# Patient Record
Sex: FEMALE | Race: WHITE | NOT HISPANIC OR LATINO | Employment: UNEMPLOYED | URBAN - METROPOLITAN AREA
[De-identification: names, ages, dates, MRNs, and addresses within clinical notes are randomized per-mention and may not be internally consistent; named-entity substitution may affect disease eponyms.]

---

## 2024-05-11 ENCOUNTER — APPOINTMENT (OUTPATIENT)
Dept: GENERAL RADIOLOGY | Facility: HOSPITAL | Age: 54
End: 2024-05-11
Payer: MEDICAID

## 2024-05-11 ENCOUNTER — APPOINTMENT (OUTPATIENT)
Dept: CT IMAGING | Facility: HOSPITAL | Age: 54
End: 2024-05-11
Payer: MEDICAID

## 2024-05-11 ENCOUNTER — HOSPITAL ENCOUNTER (INPATIENT)
Facility: HOSPITAL | Age: 54
LOS: 7 days | Discharge: HOME | End: 2024-05-20
Attending: STUDENT IN AN ORGANIZED HEALTH CARE EDUCATION/TRAINING PROGRAM | Admitting: HOSPITALIST
Payer: MEDICAID

## 2024-05-11 DIAGNOSIS — B34.8 RHINOVIRUS: ICD-10-CM

## 2024-05-11 DIAGNOSIS — A41.9 SEPSIS WITHOUT ACUTE ORGAN DYSFUNCTION, DUE TO UNSPECIFIED ORGANISM: ICD-10-CM

## 2024-05-11 DIAGNOSIS — I50.33 ACUTE ON CHRONIC DIASTOLIC CONGESTIVE HEART FAILURE: ICD-10-CM

## 2024-05-11 DIAGNOSIS — R06.02 SHORTNESS OF BREATH: ICD-10-CM

## 2024-05-11 DIAGNOSIS — J18.9 PNEUMONIA OF BOTH LUNGS DUE TO INFECTIOUS ORGANISM, UNSPECIFIED PART OF LUNG: ICD-10-CM

## 2024-05-11 DIAGNOSIS — R07.9 CHEST PAIN, UNSPECIFIED TYPE: ICD-10-CM

## 2024-05-11 DIAGNOSIS — I50.23 ACUTE ON CHRONIC HFREF (HEART FAILURE WITH REDUCED EJECTION FRACTION): ICD-10-CM

## 2024-05-11 DIAGNOSIS — I97.630 POSTOPERATIVE HEMATOMA INVOLVING CIRCULATORY SYSTEM FOLLOWING CARDIAC CATHETERIZATION: ICD-10-CM

## 2024-05-11 DIAGNOSIS — I50.9 ACUTE ON CHRONIC CONGESTIVE HEART FAILURE, UNSPECIFIED HEART FAILURE TYPE: Primary | ICD-10-CM

## 2024-05-11 LAB
ALBUMIN SERPL-MCNC: 3.7 G/DL (ref 3.5–5.2)
ALBUMIN/GLOB SERPL: 0.9 G/DL
ALP SERPL-CCNC: 103 U/L (ref 39–117)
ALT SERPL W P-5'-P-CCNC: 17 U/L (ref 1–33)
ANION GAP SERPL CALCULATED.3IONS-SCNC: 14 MMOL/L (ref 5–15)
APTT PPP: 26.6 SECONDS (ref 61–76.5)
AST SERPL-CCNC: 16 U/L (ref 1–32)
B PARAPERT DNA SPEC QL NAA+PROBE: NOT DETECTED
B PERT DNA SPEC QL NAA+PROBE: NOT DETECTED
BASOPHILS # BLD AUTO: 0.04 10*3/MM3 (ref 0–0.2)
BASOPHILS NFR BLD AUTO: 0.4 % (ref 0–1.5)
BILIRUB SERPL-MCNC: 0.8 MG/DL (ref 0–1.2)
BUN SERPL-MCNC: 11 MG/DL (ref 6–20)
BUN/CREAT SERPL: 12.4 (ref 7–25)
C PNEUM DNA NPH QL NAA+NON-PROBE: NOT DETECTED
CALCIUM SPEC-SCNC: 9.8 MG/DL (ref 8.6–10.5)
CHLORIDE SERPL-SCNC: 97 MMOL/L (ref 98–107)
CO2 SERPL-SCNC: 25 MMOL/L (ref 22–29)
CREAT SERPL-MCNC: 0.89 MG/DL (ref 0.57–1)
D-LACTATE SERPL-SCNC: 2.1 MMOL/L (ref 0.3–2)
DEPRECATED RDW RBC AUTO: 48.8 FL (ref 37–54)
EGFRCR SERPLBLD CKD-EPI 2021: 78.1 ML/MIN/1.73
EOSINOPHIL # BLD AUTO: 0.05 10*3/MM3 (ref 0–0.4)
EOSINOPHIL NFR BLD AUTO: 0.4 % (ref 0.3–6.2)
ERYTHROCYTE [DISTWIDTH] IN BLOOD BY AUTOMATED COUNT: 17 % (ref 12.3–15.4)
FLUAV SUBTYP SPEC NAA+PROBE: NOT DETECTED
FLUBV RNA ISLT QL NAA+PROBE: NOT DETECTED
GLOBULIN UR ELPH-MCNC: 4.3 GM/DL
GLUCOSE SERPL-MCNC: 155 MG/DL (ref 65–99)
HADV DNA SPEC NAA+PROBE: NOT DETECTED
HCOV 229E RNA SPEC QL NAA+PROBE: NOT DETECTED
HCOV HKU1 RNA SPEC QL NAA+PROBE: NOT DETECTED
HCOV NL63 RNA SPEC QL NAA+PROBE: NOT DETECTED
HCOV OC43 RNA SPEC QL NAA+PROBE: NOT DETECTED
HCT VFR BLD AUTO: 39.8 % (ref 34–46.6)
HGB BLD-MCNC: 11.4 G/DL (ref 12–15.9)
HMPV RNA NPH QL NAA+NON-PROBE: NOT DETECTED
HOLD SPECIMEN: NORMAL
HPIV1 RNA ISLT QL NAA+PROBE: NOT DETECTED
HPIV2 RNA SPEC QL NAA+PROBE: NOT DETECTED
HPIV3 RNA NPH QL NAA+PROBE: NOT DETECTED
HPIV4 P GENE NPH QL NAA+PROBE: NOT DETECTED
IMM GRANULOCYTES # BLD AUTO: 0.15 10*3/MM3 (ref 0–0.05)
IMM GRANULOCYTES NFR BLD AUTO: 1.3 % (ref 0–0.5)
INR PPP: 1.26 (ref 0.93–1.1)
LIPASE SERPL-CCNC: 27 U/L (ref 13–60)
LYMPHOCYTES # BLD AUTO: 2.11 10*3/MM3 (ref 0.7–3.1)
LYMPHOCYTES NFR BLD AUTO: 18.7 % (ref 19.6–45.3)
M PNEUMO IGG SER IA-ACNC: NOT DETECTED
MCH RBC QN AUTO: 22.8 PG (ref 26.6–33)
MCHC RBC AUTO-ENTMCNC: 28.6 G/DL (ref 31.5–35.7)
MCV RBC AUTO: 79.8 FL (ref 79–97)
MONOCYTES # BLD AUTO: 0.94 10*3/MM3 (ref 0.1–0.9)
MONOCYTES NFR BLD AUTO: 8.3 % (ref 5–12)
NEUTROPHILS NFR BLD AUTO: 70.9 % (ref 42.7–76)
NEUTROPHILS NFR BLD AUTO: 8.02 10*3/MM3 (ref 1.7–7)
NRBC BLD AUTO-RTO: 0 /100 WBC (ref 0–0.2)
NT-PROBNP SERPL-MCNC: ABNORMAL PG/ML (ref 0–900)
PLATELET # BLD AUTO: 350 10*3/MM3 (ref 140–450)
PMV BLD AUTO: 9.9 FL (ref 6–12)
POTASSIUM SERPL-SCNC: 4.2 MMOL/L (ref 3.5–5.2)
PROT SERPL-MCNC: 8 G/DL (ref 6–8.5)
PROTHROMBIN TIME: 13.5 SECONDS (ref 9.6–11.7)
RBC # BLD AUTO: 4.99 10*6/MM3 (ref 3.77–5.28)
RHINOVIRUS RNA SPEC NAA+PROBE: DETECTED
RSV RNA NPH QL NAA+NON-PROBE: NOT DETECTED
SARS-COV-2 RNA NPH QL NAA+NON-PROBE: NOT DETECTED
SODIUM SERPL-SCNC: 136 MMOL/L (ref 136–145)
TROPONIN T SERPL HS-MCNC: 42 NG/L
WBC NRBC COR # BLD AUTO: 11.31 10*3/MM3 (ref 3.4–10.8)

## 2024-05-11 PROCEDURE — 93005 ELECTROCARDIOGRAM TRACING: CPT

## 2024-05-11 PROCEDURE — 71045 X-RAY EXAM CHEST 1 VIEW: CPT

## 2024-05-11 PROCEDURE — 25510000001 IOPAMIDOL PER 1 ML: Performed by: PHYSICIAN ASSISTANT

## 2024-05-11 PROCEDURE — 80053 COMPREHEN METABOLIC PANEL: CPT | Performed by: PHYSICIAN ASSISTANT

## 2024-05-11 PROCEDURE — 85730 THROMBOPLASTIN TIME PARTIAL: CPT | Performed by: PHYSICIAN ASSISTANT

## 2024-05-11 PROCEDURE — 25010000002 CEFTRIAXONE PER 250 MG: Performed by: PHYSICIAN ASSISTANT

## 2024-05-11 PROCEDURE — 71275 CT ANGIOGRAPHY CHEST: CPT

## 2024-05-11 PROCEDURE — 84484 ASSAY OF TROPONIN QUANT: CPT | Performed by: PHYSICIAN ASSISTANT

## 2024-05-11 PROCEDURE — 25010000002 HYDROMORPHONE 1 MG/ML SOLUTION: Performed by: PHYSICIAN ASSISTANT

## 2024-05-11 PROCEDURE — 25010000002 LABETALOL 5 MG/ML SOLUTION: Performed by: PHYSICIAN ASSISTANT

## 2024-05-11 PROCEDURE — G0378 HOSPITAL OBSERVATION PER HR: HCPCS

## 2024-05-11 PROCEDURE — 85025 COMPLETE CBC W/AUTO DIFF WBC: CPT | Performed by: PHYSICIAN ASSISTANT

## 2024-05-11 PROCEDURE — 25010000002 ONDANSETRON PER 1 MG: Performed by: PHYSICIAN ASSISTANT

## 2024-05-11 PROCEDURE — 87040 BLOOD CULTURE FOR BACTERIA: CPT | Performed by: PHYSICIAN ASSISTANT

## 2024-05-11 PROCEDURE — 83880 ASSAY OF NATRIURETIC PEPTIDE: CPT | Performed by: PHYSICIAN ASSISTANT

## 2024-05-11 PROCEDURE — 83690 ASSAY OF LIPASE: CPT | Performed by: PHYSICIAN ASSISTANT

## 2024-05-11 PROCEDURE — 85610 PROTHROMBIN TIME: CPT | Performed by: PHYSICIAN ASSISTANT

## 2024-05-11 PROCEDURE — 83605 ASSAY OF LACTIC ACID: CPT | Performed by: PHYSICIAN ASSISTANT

## 2024-05-11 PROCEDURE — 0202U NFCT DS 22 TRGT SARS-COV-2: CPT | Performed by: PHYSICIAN ASSISTANT

## 2024-05-11 PROCEDURE — 93005 ELECTROCARDIOGRAM TRACING: CPT | Performed by: STUDENT IN AN ORGANIZED HEALTH CARE EDUCATION/TRAINING PROGRAM

## 2024-05-11 PROCEDURE — 25010000002 FUROSEMIDE PER 20 MG: Performed by: PHYSICIAN ASSISTANT

## 2024-05-11 PROCEDURE — 99285 EMERGENCY DEPT VISIT HI MDM: CPT

## 2024-05-11 RX ORDER — METOPROLOL SUCCINATE 25 MG/1
25 TABLET, EXTENDED RELEASE ORAL DAILY
COMMUNITY

## 2024-05-11 RX ORDER — LABETALOL HYDROCHLORIDE 5 MG/ML
20 INJECTION, SOLUTION INTRAVENOUS ONCE
Status: COMPLETED | OUTPATIENT
Start: 2024-05-11 | End: 2024-05-11

## 2024-05-11 RX ORDER — SPIRONOLACTONE 25 MG/1
25 TABLET ORAL DAILY
COMMUNITY
End: 2024-05-20 | Stop reason: HOSPADM

## 2024-05-11 RX ORDER — ONDANSETRON 2 MG/ML
4 INJECTION INTRAMUSCULAR; INTRAVENOUS ONCE
Status: COMPLETED | OUTPATIENT
Start: 2024-05-11 | End: 2024-05-11

## 2024-05-11 RX ORDER — LOSARTAN POTASSIUM 25 MG/1
25 TABLET ORAL DAILY
COMMUNITY
End: 2024-05-20 | Stop reason: HOSPADM

## 2024-05-11 RX ORDER — FUROSEMIDE 10 MG/ML
80 INJECTION INTRAMUSCULAR; INTRAVENOUS ONCE
Status: COMPLETED | OUTPATIENT
Start: 2024-05-11 | End: 2024-05-11

## 2024-05-11 RX ORDER — FUROSEMIDE 40 MG/1
40 TABLET ORAL DAILY
COMMUNITY

## 2024-05-11 RX ORDER — SODIUM CHLORIDE 0.9 % (FLUSH) 0.9 %
10 SYRINGE (ML) INJECTION AS NEEDED
Status: DISCONTINUED | OUTPATIENT
Start: 2024-05-11 | End: 2024-05-20 | Stop reason: HOSPADM

## 2024-05-11 RX ADMIN — Medication 20 MG: at 21:41

## 2024-05-11 RX ADMIN — ONDANSETRON 4 MG: 2 INJECTION INTRAMUSCULAR; INTRAVENOUS at 22:05

## 2024-05-11 RX ADMIN — HYDROMORPHONE HYDROCHLORIDE 0.5 MG: 1 INJECTION, SOLUTION INTRAMUSCULAR; INTRAVENOUS; SUBCUTANEOUS at 22:05

## 2024-05-11 RX ADMIN — FUROSEMIDE 80 MG: 10 INJECTION, SOLUTION INTRAMUSCULAR; INTRAVENOUS at 22:05

## 2024-05-11 RX ADMIN — IOPAMIDOL 100 ML: 755 INJECTION, SOLUTION INTRAVENOUS at 22:02

## 2024-05-11 RX ADMIN — CEFTRIAXONE 2000 MG: 2 INJECTION, POWDER, FOR SOLUTION INTRAMUSCULAR; INTRAVENOUS at 22:10

## 2024-05-11 RX ADMIN — DOXYCYCLINE 100 MG: 100 INJECTION, POWDER, LYOPHILIZED, FOR SOLUTION INTRAVENOUS at 22:10

## 2024-05-11 NOTE — Clinical Note
Level of Care: Telemetry [5]   Diagnosis: CHF exacerbation [575819]   Admitting Physician: SOPHIA HERNANDEZ [[id number]]   Attending Physician: SOPHIA HERNANDEZ [[id number]]

## 2024-05-11 NOTE — Clinical Note
A 6 fr sheath was successfully inserted into the right femoral artery. Sheath insertion not delayed.

## 2024-05-12 ENCOUNTER — APPOINTMENT (OUTPATIENT)
Dept: CARDIOLOGY | Facility: HOSPITAL | Age: 54
End: 2024-05-12
Payer: MEDICAID

## 2024-05-12 LAB
ARTERIAL PATENCY WRIST A: POSITIVE
ATMOSPHERIC PRESS: ABNORMAL MM[HG]
BASE EXCESS BLDA CALC-SCNC: 3 MMOL/L (ref 0–3)
BDY SITE: ABNORMAL
BH CV ECHO LEFT VENTRICLE GLOBAL LONGITUDINAL STRAIN: -6.4 %
BH CV ECHO MEAS - AO MAX PG: 8.2 MMHG
BH CV ECHO MEAS - AO MEAN PG: 4 MMHG
BH CV ECHO MEAS - AO V2 MAX: 143 CM/SEC
BH CV ECHO MEAS - AO V2 VTI: 18.9 CM
BH CV ECHO MEAS - AVA(I,D): 1.51 CM2
BH CV ECHO MEAS - EDV(CUBED): 91.1 ML
BH CV ECHO MEAS - EDV(MOD-SP4): 113 ML
BH CV ECHO MEAS - EF(MOD-BP): 21.5 %
BH CV ECHO MEAS - EF(MOD-SP4): 21.5 %
BH CV ECHO MEAS - EF_3D-VOL: 18 %
BH CV ECHO MEAS - ESV(CUBED): 74.1 ML
BH CV ECHO MEAS - ESV(MOD-SP4): 88.7 ML
BH CV ECHO MEAS - FS: 6.7 %
BH CV ECHO MEAS - IVS/LVPW: 1.11 CM
BH CV ECHO MEAS - IVSD: 1 CM
BH CV ECHO MEAS - LA DIMENSION: 5 CM
BH CV ECHO MEAS - LAT PEAK E' VEL: 9.7 CM/SEC
BH CV ECHO MEAS - LV DIASTOLIC VOL/BSA (35-75): 56 CM2
BH CV ECHO MEAS - LV MASS(C)D: 142.9 GRAMS
BH CV ECHO MEAS - LV MAX PG: 1.83 MMHG
BH CV ECHO MEAS - LV MEAN PG: 1 MMHG
BH CV ECHO MEAS - LV SYSTOLIC VOL/BSA (12-30): 43.9 CM2
BH CV ECHO MEAS - LV V1 MAX: 67.6 CM/SEC
BH CV ECHO MEAS - LV V1 VTI: 9.1 CM
BH CV ECHO MEAS - LVIDD: 4.5 CM
BH CV ECHO MEAS - LVIDS: 4.2 CM
BH CV ECHO MEAS - LVOT AREA: 3.1 CM2
BH CV ECHO MEAS - LVOT DIAM: 2 CM
BH CV ECHO MEAS - LVPWD: 0.9 CM
BH CV ECHO MEAS - MED PEAK E' VEL: 5.8 CM/SEC
BH CV ECHO MEAS - MR MAX PG: 79.2 MMHG
BH CV ECHO MEAS - MR MAX VEL: 445 CM/SEC
BH CV ECHO MEAS - MR MEAN PG: 56 MMHG
BH CV ECHO MEAS - MR MEAN VEL: 364 CM/SEC
BH CV ECHO MEAS - MR VTI: 112 CM
BH CV ECHO MEAS - MV A MAX VEL: 82.3 CM/SEC
BH CV ECHO MEAS - MV DEC SLOPE: 982 CM/SEC2
BH CV ECHO MEAS - MV DEC TIME: 0.11 SEC
BH CV ECHO MEAS - MV E MAX VEL: 107 CM/SEC
BH CV ECHO MEAS - MV E/A: 1.3
BH CV ECHO MEAS - MV MAX PG: 5.5 MMHG
BH CV ECHO MEAS - MV MEAN PG: 3 MMHG
BH CV ECHO MEAS - MV P1/2T: 35.8 MSEC
BH CV ECHO MEAS - MV V2 VTI: 20.9 CM
BH CV ECHO MEAS - MVA(P1/2T): 6.1 CM2
BH CV ECHO MEAS - MVA(VTI): 1.36 CM2
BH CV ECHO MEAS - PA V2 MAX: 80.8 CM/SEC
BH CV ECHO MEAS - RAP SYSTOLE: 15 MMHG
BH CV ECHO MEAS - RV MAX PG: 0.54 MMHG
BH CV ECHO MEAS - RV V1 MAX: 36.7 CM/SEC
BH CV ECHO MEAS - RV V1 VTI: 5.5 CM
BH CV ECHO MEAS - RVSP: 50 MMHG
BH CV ECHO MEAS - SV(LVOT): 28.5 ML
BH CV ECHO MEAS - SV(MOD-SP4): 24.3 ML
BH CV ECHO MEAS - SVI(LVOT): 14.1 ML/M2
BH CV ECHO MEAS - SVI(MOD-SP4): 12 ML/M2
BH CV ECHO MEAS - TAPSE (>1.6): 0.96 CM
BH CV ECHO MEAS - TR MAX PG: 35 MMHG
BH CV ECHO MEAS - TR MAX VEL: 296 CM/SEC
BH CV ECHO MEASUREMENTS AVERAGE E/E' RATIO: 13.81
BH CV LOWER ARTERIAL LEFT ABI RATIO: 0.69
BH CV LOWER ARTERIAL LEFT GREAT TOE SYS MAX: 59
BH CV LOWER ARTERIAL LEFT POST TIBIAL SYS MAX: 98
BH CV LOWER ARTERIAL LEFT TBI RATIO: 0.42
BH CV LOWER ARTERIAL RIGHT ABI RATIO: 0.82
BH CV LOWER ARTERIAL RIGHT DORSALIS PEDIS SYS MAX: 103
BH CV LOWER ARTERIAL RIGHT GREAT TOE SYS MAX: 85
BH CV LOWER ARTERIAL RIGHT POST TIBIAL SYS MAX: 116
BH CV LOWER ARTERIAL RIGHT TBI RATIO: 0.6
BH CV XLRA - TDI S': 9.4 CM/SEC
CA-I BLDA-SCNC: 1.24 MMOL/L (ref 1.15–1.33)
CREAT BLDA-MCNC: 0.96 MG/DL (ref 0.6–1.3)
D-LACTATE SERPL-SCNC: 0.3 MMOL/L (ref 0.2–2)
D-LACTATE SERPL-SCNC: 1.1 MMOL/L (ref 0.5–2)
D-LACTATE SERPL-SCNC: 2.2 MMOL/L (ref 0.5–2)
EGFRCR SERPLBLD CKD-EPI 2021: 71.3 ML/MIN/1.73
GEN 5 2HR TROPONIN T REFLEX: 59 NG/L
GLUCOSE BLDC GLUCOMTR-MCNC: 117 MG/DL (ref 74–100)
GLUCOSE BLDC GLUCOMTR-MCNC: 117 MG/DL (ref 74–100)
HCO3 BLDA-SCNC: 31.2 MMOL/L (ref 21–28)
HCT VFR BLDA CALC: 38 % (ref 38–51)
HEMODILUTION: NO
HGB BLDA-MCNC: 12.8 G/DL (ref 12–17)
INHALED O2 CONCENTRATION: 28 %
LEFT ATRIUM VOLUME INDEX: 41.3 ML/M2
MODALITY: ABNORMAL
PCO2 BLDA: 64.2 MM HG (ref 35–48)
PH BLDA: 7.29 PH UNITS (ref 7.35–7.45)
PO2 BLDA: 91.8 MM HG (ref 83–108)
POTASSIUM BLDA-SCNC: 4.4 MMOL/L (ref 3.5–4.5)
QT INTERVAL: 313 MS
QTC INTERVAL: 439 MS
SAO2 % BLDCOA: 95.8 % (ref 94–98)
SINUS: 2.8 CM
SODIUM BLD-SCNC: 136 MMOL/L (ref 138–146)
TROPONIN T DELTA: 17 NG/L
UPPER ARTERIAL LEFT ARM BRACHIAL SYS MAX: 139
UPPER ARTERIAL RIGHT ARM BRACHIAL SYS MAX: 142

## 2024-05-12 PROCEDURE — 25010000002 CEFTRIAXONE PER 250 MG: Performed by: HOSPITALIST

## 2024-05-12 PROCEDURE — 84484 ASSAY OF TROPONIN QUANT: CPT | Performed by: HOSPITALIST

## 2024-05-12 PROCEDURE — 94664 DEMO&/EVAL PT USE INHALER: CPT

## 2024-05-12 PROCEDURE — 83605 ASSAY OF LACTIC ACID: CPT

## 2024-05-12 PROCEDURE — 93306 TTE W/DOPPLER COMPLETE: CPT

## 2024-05-12 PROCEDURE — 83605 ASSAY OF LACTIC ACID: CPT | Performed by: PHYSICIAN ASSISTANT

## 2024-05-12 PROCEDURE — 93306 TTE W/DOPPLER COMPLETE: CPT | Performed by: INTERNAL MEDICINE

## 2024-05-12 PROCEDURE — G0378 HOSPITAL OBSERVATION PER HR: HCPCS

## 2024-05-12 PROCEDURE — 93356 MYOCRD STRAIN IMG SPCKL TRCK: CPT | Performed by: INTERNAL MEDICINE

## 2024-05-12 PROCEDURE — 93923 UPR/LXTR ART STDY 3+ LVLS: CPT | Performed by: STUDENT IN AN ORGANIZED HEALTH CARE EDUCATION/TRAINING PROGRAM

## 2024-05-12 PROCEDURE — 25010000002 FUROSEMIDE PER 20 MG: Performed by: HOSPITALIST

## 2024-05-12 PROCEDURE — 94799 UNLISTED PULMONARY SVC/PX: CPT

## 2024-05-12 PROCEDURE — 80051 ELECTROLYTE PANEL: CPT

## 2024-05-12 PROCEDURE — 36600 WITHDRAWAL OF ARTERIAL BLOOD: CPT | Performed by: HOSPITALIST

## 2024-05-12 PROCEDURE — 25010000002 MORPHINE PER 10 MG: Performed by: STUDENT IN AN ORGANIZED HEALTH CARE EDUCATION/TRAINING PROGRAM

## 2024-05-12 PROCEDURE — 94640 AIRWAY INHALATION TREATMENT: CPT

## 2024-05-12 PROCEDURE — 85018 HEMOGLOBIN: CPT

## 2024-05-12 PROCEDURE — 94761 N-INVAS EAR/PLS OXIMETRY MLT: CPT

## 2024-05-12 PROCEDURE — 93356 MYOCRD STRAIN IMG SPCKL TRCK: CPT

## 2024-05-12 PROCEDURE — 82565 ASSAY OF CREATININE: CPT

## 2024-05-12 PROCEDURE — 82803 BLOOD GASES ANY COMBINATION: CPT | Performed by: HOSPITALIST

## 2024-05-12 PROCEDURE — 93923 UPR/LXTR ART STDY 3+ LVLS: CPT

## 2024-05-12 PROCEDURE — 82330 ASSAY OF CALCIUM: CPT

## 2024-05-12 PROCEDURE — 82948 REAGENT STRIP/BLOOD GLUCOSE: CPT

## 2024-05-12 RX ORDER — OXYCODONE HYDROCHLORIDE 5 MG/1
10 TABLET ORAL EVERY 4 HOURS PRN
Status: DISCONTINUED | OUTPATIENT
Start: 2024-05-12 | End: 2024-05-15

## 2024-05-12 RX ORDER — LORAZEPAM 1 MG/1
1 TABLET ORAL ONCE
Status: COMPLETED | OUTPATIENT
Start: 2024-05-12 | End: 2024-05-12

## 2024-05-12 RX ORDER — AMOXICILLIN 250 MG
2 CAPSULE ORAL 2 TIMES DAILY PRN
Status: DISCONTINUED | OUTPATIENT
Start: 2024-05-12 | End: 2024-05-20 | Stop reason: HOSPADM

## 2024-05-12 RX ORDER — AZITHROMYCIN 250 MG/1
500 TABLET, FILM COATED ORAL
Status: COMPLETED | OUTPATIENT
Start: 2024-05-13 | End: 2024-05-16

## 2024-05-12 RX ORDER — BISACODYL 5 MG/1
5 TABLET, DELAYED RELEASE ORAL DAILY PRN
Status: DISCONTINUED | OUTPATIENT
Start: 2024-05-12 | End: 2024-05-20 | Stop reason: HOSPADM

## 2024-05-12 RX ORDER — SODIUM CHLORIDE 0.9 % (FLUSH) 0.9 %
10 SYRINGE (ML) INJECTION AS NEEDED
Status: DISCONTINUED | OUTPATIENT
Start: 2024-05-12 | End: 2024-05-20 | Stop reason: HOSPADM

## 2024-05-12 RX ORDER — SODIUM CHLORIDE 9 MG/ML
40 INJECTION, SOLUTION INTRAVENOUS AS NEEDED
Status: DISCONTINUED | OUTPATIENT
Start: 2024-05-12 | End: 2024-05-20 | Stop reason: HOSPADM

## 2024-05-12 RX ORDER — MORPHINE SULFATE 2 MG/ML
2 INJECTION, SOLUTION INTRAMUSCULAR; INTRAVENOUS ONCE
Status: COMPLETED | OUTPATIENT
Start: 2024-05-12 | End: 2024-05-12

## 2024-05-12 RX ORDER — ENOXAPARIN SODIUM 100 MG/ML
40 INJECTION SUBCUTANEOUS DAILY
Status: DISCONTINUED | OUTPATIENT
Start: 2024-05-12 | End: 2024-05-12

## 2024-05-12 RX ORDER — LOSARTAN POTASSIUM 25 MG/1
25 TABLET ORAL
Status: DISCONTINUED | OUTPATIENT
Start: 2024-05-12 | End: 2024-05-13

## 2024-05-12 RX ORDER — POLYETHYLENE GLYCOL 3350 17 G/17G
17 POWDER, FOR SOLUTION ORAL DAILY PRN
Status: DISCONTINUED | OUTPATIENT
Start: 2024-05-12 | End: 2024-05-20 | Stop reason: HOSPADM

## 2024-05-12 RX ORDER — AZITHROMYCIN 250 MG/1
250 TABLET, FILM COATED ORAL
Status: DISCONTINUED | OUTPATIENT
Start: 2024-05-12 | End: 2024-05-12

## 2024-05-12 RX ORDER — OXYCODONE HYDROCHLORIDE 5 MG/1
5 TABLET ORAL EVERY 4 HOURS PRN
Status: DISCONTINUED | OUTPATIENT
Start: 2024-05-12 | End: 2024-05-15

## 2024-05-12 RX ORDER — NICOTINE 21 MG/24HR
1 PATCH, TRANSDERMAL 24 HOURS TRANSDERMAL
Status: DISCONTINUED | OUTPATIENT
Start: 2024-05-12 | End: 2024-05-14

## 2024-05-12 RX ORDER — NALOXONE HCL 0.4 MG/ML
0.4 VIAL (ML) INJECTION
Status: DISCONTINUED | OUTPATIENT
Start: 2024-05-12 | End: 2024-05-20 | Stop reason: HOSPADM

## 2024-05-12 RX ORDER — SODIUM CHLORIDE 0.9 % (FLUSH) 0.9 %
10 SYRINGE (ML) INJECTION EVERY 12 HOURS SCHEDULED
Status: DISCONTINUED | OUTPATIENT
Start: 2024-05-12 | End: 2024-05-20 | Stop reason: HOSPADM

## 2024-05-12 RX ORDER — BISACODYL 10 MG
10 SUPPOSITORY, RECTAL RECTAL DAILY PRN
Status: DISCONTINUED | OUTPATIENT
Start: 2024-05-12 | End: 2024-05-20 | Stop reason: HOSPADM

## 2024-05-12 RX ORDER — BENZONATATE 100 MG/1
200 CAPSULE ORAL 3 TIMES DAILY PRN
Status: DISCONTINUED | OUTPATIENT
Start: 2024-05-12 | End: 2024-05-20 | Stop reason: HOSPADM

## 2024-05-12 RX ORDER — IPRATROPIUM BROMIDE AND ALBUTEROL SULFATE 2.5; .5 MG/3ML; MG/3ML
3 SOLUTION RESPIRATORY (INHALATION)
Status: DISCONTINUED | OUTPATIENT
Start: 2024-05-12 | End: 2024-05-12

## 2024-05-12 RX ORDER — IPRATROPIUM BROMIDE AND ALBUTEROL SULFATE 2.5; .5 MG/3ML; MG/3ML
3 SOLUTION RESPIRATORY (INHALATION)
Status: DISCONTINUED | OUTPATIENT
Start: 2024-05-12 | End: 2024-05-20 | Stop reason: HOSPADM

## 2024-05-12 RX ORDER — FUROSEMIDE 10 MG/ML
40 INJECTION INTRAMUSCULAR; INTRAVENOUS EVERY 12 HOURS
Status: DISCONTINUED | OUTPATIENT
Start: 2024-05-12 | End: 2024-05-13

## 2024-05-12 RX ORDER — CEFDINIR 300 MG/1
300 CAPSULE ORAL EVERY 12 HOURS SCHEDULED
Qty: 14 CAPSULE | Refills: 0 | Status: DISCONTINUED | OUTPATIENT
Start: 2024-05-12 | End: 2024-05-12

## 2024-05-12 RX ORDER — ASPIRIN 81 MG/1
81 TABLET ORAL DAILY
Status: DISCONTINUED | OUTPATIENT
Start: 2024-05-12 | End: 2024-05-20 | Stop reason: HOSPADM

## 2024-05-12 RX ORDER — METOPROLOL SUCCINATE 25 MG/1
25 TABLET, EXTENDED RELEASE ORAL EVERY 12 HOURS SCHEDULED
Status: DISCONTINUED | OUTPATIENT
Start: 2024-05-12 | End: 2024-05-20 | Stop reason: HOSPADM

## 2024-05-12 RX ADMIN — METOPROLOL SUCCINATE 25 MG: 25 TABLET, FILM COATED, EXTENDED RELEASE ORAL at 22:03

## 2024-05-12 RX ADMIN — BENZONATATE 200 MG: 100 CAPSULE ORAL at 11:35

## 2024-05-12 RX ADMIN — Medication 10 ML: at 08:12

## 2024-05-12 RX ADMIN — Medication 10 ML: at 03:00

## 2024-05-12 RX ADMIN — AZITHROMYCIN DIHYDRATE 250 MG: 250 TABLET ORAL at 08:00

## 2024-05-12 RX ADMIN — IPRATROPIUM BROMIDE AND ALBUTEROL SULFATE 3 ML: .5; 3 SOLUTION RESPIRATORY (INHALATION) at 11:27

## 2024-05-12 RX ADMIN — OXYCODONE 10 MG: 5 TABLET ORAL at 22:03

## 2024-05-12 RX ADMIN — MORPHINE SULFATE 2 MG: 2 INJECTION, SOLUTION INTRAMUSCULAR; INTRAVENOUS at 08:00

## 2024-05-12 RX ADMIN — Medication 1 PATCH: at 03:23

## 2024-05-12 RX ADMIN — IPRATROPIUM BROMIDE AND ALBUTEROL SULFATE 3 ML: .5; 3 SOLUTION RESPIRATORY (INHALATION) at 19:04

## 2024-05-12 RX ADMIN — ASPIRIN 81 MG: 81 TABLET, COATED ORAL at 12:41

## 2024-05-12 RX ADMIN — CEFTRIAXONE 2000 MG: 2 INJECTION, POWDER, FOR SOLUTION INTRAMUSCULAR; INTRAVENOUS at 10:18

## 2024-05-12 RX ADMIN — LOSARTAN POTASSIUM 25 MG: 25 TABLET, FILM COATED ORAL at 11:36

## 2024-05-12 RX ADMIN — Medication 10 ML: at 22:03

## 2024-05-12 RX ADMIN — OXYCODONE 10 MG: 5 TABLET ORAL at 11:35

## 2024-05-12 RX ADMIN — APIXABAN 5 MG: 5 TABLET, FILM COATED ORAL at 22:02

## 2024-05-12 RX ADMIN — IPRATROPIUM BROMIDE AND ALBUTEROL SULFATE 3 ML: .5; 3 SOLUTION RESPIRATORY (INHALATION) at 14:33

## 2024-05-12 RX ADMIN — LORAZEPAM 1 MG: 1 TABLET ORAL at 09:06

## 2024-05-12 RX ADMIN — FUROSEMIDE 40 MG: 10 INJECTION, SOLUTION INTRAMUSCULAR; INTRAVENOUS at 08:00

## 2024-05-12 RX ADMIN — FUROSEMIDE 40 MG: 10 INJECTION, SOLUTION INTRAMUSCULAR; INTRAVENOUS at 22:02

## 2024-05-12 RX ADMIN — METOPROLOL SUCCINATE 25 MG: 25 TABLET, FILM COATED, EXTENDED RELEASE ORAL at 12:41

## 2024-05-12 NOTE — ED PROVIDER NOTES
Subjective   History of Present Illness  Patient is a 53-year-old female who presents with chest pain shortness of breath worsening over the last several days.  She reports that she was seen in [place] about a month ago and was diagnosed with PE CHF exacerbation.  She is been taking her Xarelto but she is worried about blood clots.  She reports she has been prescribed Lasix but she does not think it is helping.        Review of Systems   Constitutional:  Negative for chills, diaphoresis, fatigue and fever.   HENT:  Negative for congestion, ear pain, sinus pressure, sore throat, trouble swallowing and voice change.    Respiratory:  Positive for cough, chest tightness and shortness of breath.    Cardiovascular:  Positive for chest pain and leg swelling. Negative for palpitations.   Gastrointestinal:  Negative for abdominal distention, nausea and vomiting.   Genitourinary: Negative.    Musculoskeletal: Negative.    Skin: Negative.    Neurological: Negative.    Psychiatric/Behavioral: Negative.         No past medical history on file.    Allergies   Allergen Reactions    Heparin Rash     Possible rash from heparin use.    Wasp Venom Shortness Of Breath       No past surgical history on file.    No family history on file.    Social History     Socioeconomic History    Marital status:            Objective   Physical Exam  Constitutional:       General: She is not in acute distress.     Appearance: Normal appearance. She is well-developed. She is ill-appearing. She is not toxic-appearing or diaphoretic.   HENT:      Head: Normocephalic and atraumatic.      Nose: Nose normal.   Eyes:      Conjunctiva/sclera: Conjunctivae normal.   Cardiovascular:      Rate and Rhythm: Normal rate and regular rhythm.   Pulmonary:      Effort: Pulmonary effort is normal. No respiratory distress.      Breath sounds: No stridor. Examination of the right-upper field reveals decreased breath sounds. Examination of the left-upper field  "reveals decreased breath sounds. Examination of the right-middle field reveals decreased breath sounds. Examination of the left-middle field reveals decreased breath sounds. Decreased breath sounds present. No wheezing, rhonchi or rales.   Musculoskeletal:         General: Normal range of motion.      Cervical back: Normal range of motion.   Skin:     General: Skin is warm and dry.   Neurological:      General: No focal deficit present.      Mental Status: She is alert and oriented to person, place, and time. Mental status is at baseline.   Psychiatric:         Mood and Affect: Mood normal.         Behavior: Behavior normal.         Thought Content: Thought content normal.         Judgment: Judgment normal.         Procedures           ED Course  ED Course as of 05/11/24 2221   Sat May 11, 2024   2220 EKG interpreted by ER physician reviewed by myself.  Sinus tachycardia rate of 118.  Nonspecific T wave abnormalities no previous on file. [MG]      ED Course User Index  [MG] Sherly Grayson PA-C                                             Medical Decision Making  Appropriate PPE worn during exam.    /84   Pulse 91   Temp 98.1 °F (36.7 °C) (Oral)   Resp 26   Ht 165.1 cm (65\")   Wt 95.3 kg (210 lb)   SpO2 99%   BMI 34.95 kg/m²      Co-morbidities --  has no past medical history on file.  Radiology interpretation --  X-rays reviewed by me and independently interpreted by radiologist:  CT Angiogram Chest Pulmonary Embolism    Result Date: 5/11/2024  Impression: 1. No evidence of pulmonary embolism. 2. Bibasilar pneumonia with focal consolidative changes present within the inferior left upper lobe. Follow-up to resolution recommended given the masslike appearance. 3. Mild to moderate underlying pulmonary edema pattern with small right-sided pleural effusion and small to moderate size left-sided pleural effusion. There is cardiomegaly. Anasarca of the soft tissues present. 4. Ancillary findings as described " above. Electronically Signed: Sarah Good MD  5/11/2024 10:10 PM EDT  Workstation ID: [id number]    XR Chest 1 View    Result Date: 5/11/2024  Impression: Bibasilar pneumonia with probable mild underlying pulmonary edema pattern and small bilateral pleural effusions. There is mild cardiomegaly.. Electronically Signed: Sarah Good MD  5/11/2024 9:03 PM EDT  Workstation ID: [id number]      Patient is a 53-year-old female who presents with bibasilar pneumonia, pulmonary edema.  Patient's BNP was greater than 20,000, lactic acid 2.1, positive for rhinovirus, troponin 42, white blood cell count 11.3 she was initiated on Rocephin and doxycycline and given Lasix.  She will be admitted to the hospitalist.  I discussed the findings and recommendations with the patient who voices understanding. Stable while in the ER.     Note Disclaimer: At [facility], we believe that sharing information builds trust and better relationships. You are receiving this note because you are receiving care at [facility] or recently visited. It is possible you will see health information before a provider has talked with you about it. This kind of information can be easy to misunderstand. To help you fully understand what it means for your health, we urge you to discuss this note with your provider.        Problems Addressed:  Acute on chronic congestive heart failure, unspecified heart failure type: complicated acute illness or injury  Chest pain, unspecified type: complicated acute illness or injury  Pneumonia of both lungs due to infectious organism, unspecified part of lung: complicated acute illness or injury  Rhinovirus: complicated acute illness or injury  Shortness of breath: complicated acute illness or injury    Amount and/or Complexity of Data Reviewed  Labs: ordered.  Radiology: ordered.    Risk  Prescription drug management.        Final diagnoses:   Chest pain, unspecified type   Acute on chronic congestive heart failure,  unspecified heart failure type   Shortness of breath   Pneumonia of both lungs due to infectious organism, unspecified part of lung   Rhinovirus   Sepsis without acute organ dysfunction, due to unspecified organism       ED Disposition  ED Disposition       ED Disposition   Intended Admit    Condition   --    Comment   --               No follow-up provider specified.       Medication List      No changes were made to your prescriptions during this visit.            Sherly Grayson PA-C  05/11/24 [id number]

## 2024-05-12 NOTE — H&P
[facility] Medicine Services      Patient Name: Jen Cruz  : 1970  MRN: [id number]  Primary Care Physician:  Provider, No Known  Date of admission: 2024      Subjective      Chief Complaint: Shortness of breath    History of Present Illness: Jen Cruz is a 53 y.o. female who presented to [facility] on 2024 complaining of progressive shortness of breath and wheezing with some productive cough for last few days.  Patient underwent workup in ER including CT chest PE protocol, revealing no pulmonary emboli, but did reveal multifocal pneumonia in both lung fields, her viral panel came out positive for rhinovirus.  Patient received some IV antibiotics in ER.  Patient also noted to significantly elevated proBNP and CT chest also revealing CHF pattern.  Did receive a dose of Lasix in ER.  Patient says she was in the [facility] from where she was transferred to [facility], [place], patient was diagnosed with the DVT and PE and with some coagulation disorder for which she was put on Eliquis.  Patient also was noted to have cardiomyopathy with ejection fraction around 25%.,  She denies having any cardiac cath or stress test done.  Patient's home medication list reveals she is on metoprolol, Aldactone  And diuretic therapy.  Following this we were asked to the patient for the further care      Review of Systems   All other systems reviewed and are negative.      Personal History     No past medical history on file.  Significant for tobacco abuse, cardiomyopathy, coagulopathy  No past surgical history on file.    Family History: family history is not on file. Otherwise pertinent FHx was reviewed and not pertinent to current issue.    Social History:    Significant for tobacco tobacco abuse for some time, denies alcohol abuse.  Home Medications:  Prior to Admission Medications       None              Allergies:  Allergies   Allergen Reactions    Heparin Rash      Possible rash from heparin use.    Wasp Venom Shortness Of Breath       Objective      Vitals:   Temp:  [98.1 °F (36.7 °C)] 98.1 °F (36.7 °C)  Heart Rate:  [] 91  Resp:  [26] 26  BP: (118-173)/() 128/84    Physical Exam  Vitals and nursing note reviewed.   Constitutional:       General: She is not in acute distress.     Appearance: Normal appearance. She is well-developed. She is not ill-appearing, toxic-appearing or diaphoretic.   HENT:      Head: Normocephalic and atraumatic.      Right Ear: Ear canal and external ear normal.      Left Ear: Ear canal and external ear normal.      Nose: Nose normal. No congestion or rhinorrhea.      Mouth/Throat:      Mouth: Mucous membranes are moist.      Pharynx: No oropharyngeal exudate.   Eyes:      General: No scleral icterus.        Right eye: No discharge.         Left eye: No discharge.      Extraocular Movements: Extraocular movements intact.      Conjunctiva/sclera: Conjunctivae normal.      Pupils: Pupils are equal, round, and reactive to light.   Neck:      Thyroid: No thyromegaly.      Vascular: No carotid bruit or JVD.      Trachea: No tracheal deviation.   Cardiovascular:      Rate and Rhythm: Normal rate and regular rhythm.      Pulses: Normal pulses.      Heart sounds: Normal heart sounds. No murmur heard.     No friction rub. No gallop.   Pulmonary:      Effort: No respiratory distress.      Breath sounds: No stridor. Wheezing and rales present. No rhonchi.   Chest:      Chest wall: No tenderness.   Abdominal:      General: Bowel sounds are normal. There is no distension.      Palpations: Abdomen is soft. There is no mass.      Tenderness: There is no abdominal tenderness. There is no guarding or rebound.      Hernia: No hernia is present.   Musculoskeletal:         General: No swelling, tenderness, deformity or signs of injury. Normal range of motion.      Cervical back: Normal range of motion and neck supple. No rigidity. No muscular tenderness.       Right lower leg: No edema.      Left lower leg: No edema.   Lymphadenopathy:      Cervical: No cervical adenopathy.   Skin:     General: Skin is warm and dry.      Coloration: Skin is not jaundiced or pale.      Findings: No bruising, erythema or rash.   Neurological:      General: No focal deficit present.      Mental Status: She is alert and oriented to person, place, and time. Mental status is at baseline.      Cranial Nerves: No cranial nerve deficit.      Sensory: No sensory deficit.      Motor: No weakness or abnormal muscle tone.      Coordination: Coordination normal.   Psychiatric:         Mood and Affect: Mood normal.         Behavior: Behavior normal.         Thought Content: Thought content normal.         Judgment: Judgment normal.          Result Review    Result Review:  I have personally reviewed the results from the time of this admission to 5/11/2024 22:43 EDT and agree with these findings:  [x]  Laboratory  [x]  Microbiology  [x]  Radiology  []  EKG/Telemetry   []  Cardiology/Vascular   []  Pathology  []  Old records  []  Other:  Most notable findings include:       Assessment & Plan        Active Hospital Problems:  Active Hospital Problems    Diagnosis     **CHF exacerbation      Plan:     Hypoxia secondary to bilateral multifocal pneumonia likely viral, will treat the patient empirically with Omnicef and doxycycline orally, monitor clinical progress.    Cardiomyopathy with ejection fraction around 25%/acute on chronic systolic heart failure, Lasix 40 IV 2 times a day, repeat 2D echo, cardiology consult, patient likely will need CAD workup if not done in [place].  Patient noted on metoprolol, losartan and Aldactone, will resume once home medications are reconciled.    Active tobacco abuse, patient counseled for cessation.    Coagulopathy as per patient, patient will need outpatient hematology follow-up upon discharge, continue Eliquis        DVT prophylaxis:  Medical DVT prophylaxis orders are  signed and held. Medical DVT prophylaxis orders are present.        CODE STATUS:    Code Status (Patient has no pulse and is not breathing): CPR (Attempt to Resuscitate)  Medical Interventions (Patient has pulse or is breathing): Full Support    Admission Status:  I believe this patient meets observation status.    I discussed the patient's findings and my recommendations with patient.    This patient has been examined wearing appropriate Personal Protective Equipment and discussed with hospital infection control department. 05/11/24      Signature:

## 2024-05-12 NOTE — PLAN OF CARE
Goal Outcome Evaluation:      Pt is aox4 on 2L. pt let her needs be known to staff pt complained of pain PRN meds given and effective.pt got a echo and a arterial doppler done today. Will continue plan of care.     Problem: Adult Inpatient Plan of Care  Goal: Plan of Care Review  Outcome: Ongoing, Progressing  Goal: Patient-Specific Goal (Individualized)  Outcome: Ongoing, Progressing  Goal: Absence of Hospital-Acquired Illness or Injury  Outcome: Ongoing, Progressing  Intervention: Identify and Manage Fall Risk  Recent Flowsheet Documentation  Taken 5/12/2024 1806 by Dang Alicea LPN  Safety Promotion/Fall Prevention:   safety round/check completed   room organization consistent   nonskid shoes/slippers when out of bed   mobility aid in reach   lighting adjusted   gait belt   fall prevention program maintained   clutter free environment maintained   assistive device/personal items within reach   activity supervised  Taken 5/12/2024 1645 by Dang Alicea LPN  Safety Promotion/Fall Prevention:   safety round/check completed   room organization consistent   nonskid shoes/slippers when out of bed   mobility aid in reach   lighting adjusted   gait belt   fall prevention program maintained   clutter free environment maintained   assistive device/personal items within reach   activity supervised  Taken 5/12/2024 1450 by Dang Alicea LPN  Safety Promotion/Fall Prevention:   safety round/check completed   room organization consistent   nonskid shoes/slippers when out of bed   mobility aid in reach   lighting adjusted   gait belt   fall prevention program maintained   clutter free environment maintained   assistive device/personal items within reach   activity supervised  Taken 5/12/2024 1244 by Dang Alicea LPN  Safety Promotion/Fall Prevention:   safety round/check completed   nonskid shoes/slippers when out of bed   room organization consistent   mobility aid in reach   lighting adjusted   gait belt   fall prevention  program maintained   clutter free environment maintained   assistive device/personal items within reach   activity supervised  Taken 5/12/2024 1058 by Dang Alicea LPN  Safety Promotion/Fall Prevention:   safety round/check completed   room organization consistent   nonskid shoes/slippers when out of bed   mobility aid in reach   lighting adjusted   fall prevention program maintained   gait belt   clutter free environment maintained   activity supervised   assistive device/personal items within reach  Taken 5/12/2024 0840 by Dang Alicea LPN  Safety Promotion/Fall Prevention:   safety round/check completed   room organization consistent   nonskid shoes/slippers when out of bed   mobility aid in reach   gait belt   lighting adjusted   fall prevention program maintained   clutter free environment maintained   assistive device/personal items within reach   activity supervised  Intervention: Prevent Skin Injury  Recent Flowsheet Documentation  Taken 5/12/2024 1645 by Dang Alicea LPN  Body Position: position changed independently  Taken 5/12/2024 1244 by Dang Alicea LPN  Body Position: position changed independently  Taken 5/12/2024 0840 by Dang Alicea LPN  Body Position: position changed independently  Skin Protection: adhesive use limited  Intervention: Prevent and Manage VTE (Venous Thromboembolism) Risk  Recent Flowsheet Documentation  Taken 5/12/2024 1645 by Dang Alicea LPN  Activity Management: activity encouraged  Taken 5/12/2024 1244 by Dang Alicea LPN  Activity Management: activity encouraged  Taken 5/12/2024 0840 by Dang Alicea LPN  Activity Management: activity encouraged  Intervention: Prevent Infection  Recent Flowsheet Documentation  Taken 5/12/2024 1806 by Dang Alicea LPN  Infection Prevention:   single patient room provided   rest/sleep promoted   personal protective equipment utilized   hand hygiene promoted   equipment surfaces disinfected   environmental surveillance performed   cohorting  utilized  Taken 5/12/2024 1645 by Dang Alicea LPN  Infection Prevention:   single patient room provided   rest/sleep promoted   personal protective equipment utilized   equipment surfaces disinfected   hand hygiene promoted   environmental surveillance performed   cohorting utilized  Taken 5/12/2024 1450 by Dang Alicea LPN  Infection Prevention:   single patient room provided   rest/sleep promoted   personal protective equipment utilized   hand hygiene promoted   equipment surfaces disinfected   environmental surveillance performed   cohorting utilized  Taken 5/12/2024 1244 by Dang Alicea LPN  Infection Prevention:   single patient room provided   rest/sleep promoted   personal protective equipment utilized   hand hygiene promoted   equipment surfaces disinfected   environmental surveillance performed   cohorting utilized  Taken 5/12/2024 1058 by Dang Alicea LPN  Infection Prevention:   single patient room provided   rest/sleep promoted   personal protective equipment utilized   hand hygiene promoted   equipment surfaces disinfected   environmental surveillance performed   cohorting utilized  Taken 5/12/2024 0840 by Dang Alicea LPN  Infection Prevention:   single patient room provided   rest/sleep promoted   personal protective equipment utilized   equipment surfaces disinfected   hand hygiene promoted   environmental surveillance performed   cohorting utilized  Goal: Optimal Comfort and Wellbeing  Outcome: Ongoing, Progressing  Intervention: Provide Person-Centered Care  Recent Flowsheet Documentation  Taken 5/12/2024 1645 by Dang Alicea LPN  Trust Relationship/Rapport:   care explained   choices provided   emotional support provided   empathic listening provided   questions encouraged   questions answered   reassurance provided   thoughts/feelings acknowledged  Taken 5/12/2024 0840 by Dang Alicea LPN  Trust Relationship/Rapport:   care explained   emotional support provided   choices provided   empathic  listening provided   questions encouraged   reassurance provided   thoughts/feelings acknowledged   questions answered  Goal: Readiness for Transition of Care  Outcome: Ongoing, Progressing     Problem: Breathing Pattern Ineffective  Goal: Effective Breathing Pattern  Outcome: Ongoing, Progressing  Intervention: Promote Improved Breathing Pattern  Recent Flowsheet Documentation  Taken 5/12/2024 1645 by Dang Alicea LPN  Head of Bed (HOB) Positioning: HOB elevated  Taken 5/12/2024 1244 by Dang Alicea LPN  Head of Bed (HOB) Positioning: HOB elevated  Taken 5/12/2024 0840 by Dang Alicea LPN  Supportive Measures: active listening utilized  Head of Bed (HOB) Positioning: HOB elevated     Problem: Adjustment to Illness (Heart Failure)  Goal: Optimal Coping  Outcome: Ongoing, Progressing  Intervention: Support Psychosocial Response  Recent Flowsheet Documentation  Taken 5/12/2024 1645 by Dang Alicea LPN  Family/Support System Care:   support provided   self-care encouraged  Taken 5/12/2024 0840 by Dang Alicea LPN  Supportive Measures: active listening utilized  Family/Support System Care:   support provided   self-care encouraged     Problem: Cardiac Output Decreased (Heart Failure)  Goal: Optimal Cardiac Output  Outcome: Ongoing, Progressing  Intervention: Optimize Cardiac Output  Recent Flowsheet Documentation  Taken 5/12/2024 0840 by Dang Alicea LPN  Environmental Support: calm environment promoted     Problem: Dysrhythmia (Heart Failure)  Goal: Stable Heart Rate and Rhythm  Outcome: Ongoing, Progressing     Problem: Fluid Imbalance (Heart Failure)  Goal: Fluid Balance  Outcome: Ongoing, Progressing     Problem: Functional Ability Impaired (Heart Failure)  Goal: Optimal Functional Ability  Outcome: Ongoing, Progressing  Intervention: Optimize Functional Ability  Recent Flowsheet Documentation  Taken 5/12/2024 1645 by Dang Alicea LPN  Activity Management: activity encouraged  Taken 5/12/2024 1244 by Dang Alicea  LPN  Activity Management: activity encouraged  Taken 5/12/2024 0840 by Dang Alicea LPN  Activity Management: activity encouraged     Problem: Oral Intake Inadequate (Heart Failure)  Goal: Optimal Nutrition Intake  Outcome: Ongoing, Progressing     Problem: Respiratory Compromise (Heart Failure)  Goal: Effective Oxygenation and Ventilation  Outcome: Ongoing, Progressing  Intervention: Promote Airway Secretion Clearance  Recent Flowsheet Documentation  Taken 5/12/2024 1645 by Dang Alicea LPN  Cough And Deep Breathing: done independently per patient  Taken 5/12/2024 0840 by Dang Alicea LPN  Cough And Deep Breathing: done independently per patient     Problem: Sleep Disordered Breathing (Heart Failure)  Goal: Effective Breathing Pattern During Sleep  Outcome: Ongoing, Progressing

## 2024-05-12 NOTE — NURSING NOTE
Dietary went in to get pt dinner order but was having trouble waking her up. Dietary came and got me, went in the room and assess pt HR 79 /80 MAP 90 and O2 96 2L, called pt name and she wouldn't respond so I sternal rubbed her she woke up a little bit but couldn't answer question fully got in touch with DR explained what's going with pt. Dr placed ABG orders. Stayed in with pt after ABG's resulted back PH 7.2, CO2 64.2 and HCO3 31.2. sternal rubbed pt more and was able to answer question a little bit more and state she was just so tired that's why she's feeling like that.plan of care will continue.

## 2024-05-12 NOTE — PROGRESS NOTES
[facility] MEDICINE SERVICE  DAILY PROGRESS NOTE    NAME: Jen Cruz  : 1970  MRN: [id number]      LOS: 0 days     PROVIDER OF SERVICE: Denis Acevedo MD    Chief Complaint: CHF exacerbation    Subjective:     Interval History:  History taken from: patient chart RN  Shortness of breath or productive cough    Review of Systems:   Review of Systems  All negative except as above  Objective:     Vital Signs  Temp:  [98.1 °F (36.7 °C)-98.2 °F (36.8 °C)] 98.2 °F (36.8 °C)  Heart Rate:  [] 106  Resp:  [24-26] 26  BP: (118-173)/() 138/88  Flow (L/min):  [2] 2   Body mass index is 34.95 kg/m².    Physical Exam  Physical Exam  AOx3 NAD  RRR S1-S2 audible  Lungs with diminished breath sounds  Abdomen soft nontender nondistended  Bilateral lower extremity with +1 pitting edema bluish discoloration of feet (chronic per patient)  Scheduled Meds   apixaban, 5 mg, Oral, Q12H  [START ON 2024] azithromycin, 500 mg, Oral, Q24H  cefTRIAXone, 2,000 mg, Intravenous, Q24H  furosemide, 40 mg, Intravenous, Q12H  ipratropium-albuterol, 3 mL, Nebulization, 4x Daily - RT  losartan, 25 mg, Oral, Q24H  metoprolol succinate XL, 25 mg, Oral, Q12H  nicotine, 1 patch, Transdermal, Q24H  sodium chloride, 10 mL, Intravenous, Q12H       PRN Meds     benzonatate    senna-docusate sodium **AND** polyethylene glycol **AND** bisacodyl **AND** bisacodyl    Calcium Replacement - Follow Nurse / BPA Driven Protocol    Magnesium Low Dose Replacement - Follow Nurse / BPA Driven Protocol    oxyCODONE    oxyCODONE    Phosphorus Replacement - Follow Nurse / BPA Driven Protocol    Potassium Replacement - Follow Nurse / BPA Driven Protocol    [COMPLETED] Insert Peripheral IV **AND** sodium chloride    sodium chloride    sodium chloride   Infusions         Diagnostic Data    Results from last 7 days   Lab Units 24   WBC 10*3/mm3 11.31*   HEMOGLOBIN g/dL 11.4*   HEMATOCRIT % 39.8   PLATELETS 10*3/mm3 350   GLUCOSE  mg/dL 155*   CREATININE mg/dL 0.89   BUN mg/dL 11   SODIUM mmol/L 136   POTASSIUM mmol/L 4.2   AST (SGOT) U/L 16   ALT (SGPT) U/L 17   ALK PHOS U/L 103   BILIRUBIN mg/dL 0.8   ANION GAP mmol/L 14.0       CT Angiogram Chest Pulmonary Embolism    Result Date: 5/11/2024  Impression: 1. No evidence of pulmonary embolism. 2. Bibasilar pneumonia with focal consolidative changes present within the inferior left upper lobe. Follow-up to resolution recommended given the masslike appearance. 3. Mild to moderate underlying pulmonary edema pattern with small right-sided pleural effusion and small to moderate size left-sided pleural effusion. There is cardiomegaly. Anasarca of the soft tissues present. 4. Ancillary findings as described above. Electronically Signed: Sarah Good MD  5/11/2024 10:10 PM EDT  Workstation ID: [id number]    XR Chest 1 View    Result Date: 5/11/2024  Impression: Bibasilar pneumonia with probable mild underlying pulmonary edema pattern and small bilateral pleural effusions. There is mild cardiomegaly.. Electronically Signed: Sarah Good MD  5/11/2024 9:03 PM EDT  Workstation ID: [id number]       I reviewed the patient's new clinical results.  I reviewed the patient's new imaging results and agree with the interpretation.    Assessment/Plan:     Active and Resolved Problems  Active Hospital Problems    Diagnosis  POA    **CHF exacerbation [I50.9]  Yes      Resolved Hospital Problems   No resolved problems to display.       53-year-old female with history of HFrEF, DVT/PE, tobacco abuse, underlying clotting disorder?  Admitted to [facility] 5/11 with progressive shortness of breath and cough      #Acute respiratory distress secondary to viral pneumonia(rhinovirus) and acute on chronic HFrEF.  Unable to rule out bacterial component   #Hypertension  #Tobacco abuse  #Suspect underlying undiagnosed COPD  -RVP positive for human rhinovirus/enterovirus  -proBNP [id number] on admission   -chest x-ray with bibasilar  pneumonia with probable mild underlying pulmonary edema pattern and small bilateral pleural effusion  -CTA chest no evidence of PE bibasilar pneumonia and focal consolidation changes inferior upper lobe small to moderate left-sided pleural effusion    Continue IV Lasix 40 twice daily Monitor I's and O's  Cardiology has been consulted  TTE  Continue ceftriaxone change Doxy to azithromycin  Start DuoNebs every 6 scheduled  Supplemental oxygen as needed to keep SpO2 more than 92  Low threshold for steroids  Tessalon Perles as needed for cough  Isolation per protocol  Continue NRT   Resume home dose of Toprol and losartan  Telemonitoring    #NSTEMI suspect type II  Cardiology consulted as above  Start aspirin      #History of DVT/PE  Continue home dose of Eliquis  Patient reports of an underlying clotting disorder however she is unsure of details  Bluish discoloration of bilateral feet (per patient chronic) concern for underlying PAD will get arterial doppler LE     DVT prophylaxis:  Medical DVT prophylaxis orders are present.         Code status is   Code Status and Medical Interventions:   Ordered at: 05/11/24 2240     Code Status (Patient has no pulse and is not breathing):    CPR (Attempt to Resuscitate)     Medical Interventions (Patient has pulse or is breathing):    Full Support       Plan for disposition: Anticipate discharge in 4 days    Time: 30 minutes    Signature: Electronically signed by Denis Acevedo MD, 05/12/24, 11:18 EDT.  [facility] Hospitalist Team

## 2024-05-13 ENCOUNTER — APPOINTMENT (OUTPATIENT)
Dept: ULTRASOUND IMAGING | Facility: HOSPITAL | Age: 54
End: 2024-05-13
Payer: MEDICAID

## 2024-05-13 PROBLEM — I50.23 ACUTE ON CHRONIC HFREF (HEART FAILURE WITH REDUCED EJECTION FRACTION): Status: ACTIVE | Noted: 2024-05-13

## 2024-05-13 PROBLEM — B34.8 RHINOVIRUS INFECTION: Status: ACTIVE | Noted: 2024-05-13

## 2024-05-13 LAB
ALBUMIN SERPL-MCNC: 3.5 G/DL (ref 3.5–5.2)
ALBUMIN/GLOB SERPL: 0.9 G/DL
ALP SERPL-CCNC: 113 U/L (ref 39–117)
ALT SERPL W P-5'-P-CCNC: 17 U/L (ref 1–33)
ANION GAP SERPL CALCULATED.3IONS-SCNC: 13 MMOL/L (ref 5–15)
AST SERPL-CCNC: 24 U/L (ref 1–32)
BASOPHILS # BLD AUTO: 0.07 10*3/MM3 (ref 0–0.2)
BASOPHILS NFR BLD AUTO: 0.6 % (ref 0–1.5)
BILIRUB SERPL-MCNC: 0.7 MG/DL (ref 0–1.2)
BILIRUB UR QL STRIP: NEGATIVE
BUN SERPL-MCNC: 23 MG/DL (ref 6–20)
BUN/CREAT SERPL: 13.7 (ref 7–25)
CALCIUM SPEC-SCNC: 9.2 MG/DL (ref 8.6–10.5)
CHLORIDE SERPL-SCNC: 96 MMOL/L (ref 98–107)
CK SERPL-CCNC: 59 U/L (ref 20–180)
CLARITY UR: CLEAR
CO2 SERPL-SCNC: 28 MMOL/L (ref 22–29)
COLOR UR: YELLOW
CREAT SERPL-MCNC: 1.68 MG/DL (ref 0.57–1)
DEPRECATED RDW RBC AUTO: 48 FL (ref 37–54)
EGFRCR SERPLBLD CKD-EPI 2021: 36.4 ML/MIN/1.73
EOSINOPHIL # BLD AUTO: 0.19 10*3/MM3 (ref 0–0.4)
EOSINOPHIL NFR BLD AUTO: 1.6 % (ref 0.3–6.2)
ERYTHROCYTE [DISTWIDTH] IN BLOOD BY AUTOMATED COUNT: 16.8 % (ref 12.3–15.4)
FERRITIN SERPL-MCNC: 146.3 NG/ML (ref 13–150)
GLOBULIN UR ELPH-MCNC: 4 GM/DL
GLUCOSE SERPL-MCNC: 118 MG/DL (ref 65–99)
GLUCOSE UR STRIP-MCNC: NEGATIVE MG/DL
HCT VFR BLD AUTO: 35.8 % (ref 34–46.6)
HGB BLD-MCNC: 10.3 G/DL (ref 12–15.9)
HGB UR QL STRIP.AUTO: NEGATIVE
IMM GRANULOCYTES # BLD AUTO: 0.48 10*3/MM3 (ref 0–0.05)
IMM GRANULOCYTES NFR BLD AUTO: 4 % (ref 0–0.5)
IRON 24H UR-MRATE: 52 MCG/DL (ref 37–145)
IRON SATN MFR SERPL: 9 % (ref 20–50)
KETONES UR QL STRIP: NEGATIVE
LEUKOCYTE ESTERASE UR QL STRIP.AUTO: NEGATIVE
LYMPHOCYTES # BLD AUTO: 2.77 10*3/MM3 (ref 0.7–3.1)
LYMPHOCYTES NFR BLD AUTO: 23 % (ref 19.6–45.3)
MAGNESIUM SERPL-MCNC: 2.4 MG/DL (ref 1.6–2.6)
MCH RBC QN AUTO: 23.1 PG (ref 26.6–33)
MCHC RBC AUTO-ENTMCNC: 28.8 G/DL (ref 31.5–35.7)
MCV RBC AUTO: 80.3 FL (ref 79–97)
MONOCYTES # BLD AUTO: 1.14 10*3/MM3 (ref 0.1–0.9)
MONOCYTES NFR BLD AUTO: 9.5 % (ref 5–12)
NEUTROPHILS NFR BLD AUTO: 61.3 % (ref 42.7–76)
NEUTROPHILS NFR BLD AUTO: 7.37 10*3/MM3 (ref 1.7–7)
NITRITE UR QL STRIP: NEGATIVE
NRBC BLD AUTO-RTO: 0.2 /100 WBC (ref 0–0.2)
PH UR STRIP.AUTO: <=5 [PH] (ref 5–8)
PHOSPHATE SERPL-MCNC: 4 MG/DL (ref 2.5–4.5)
PLATELET # BLD AUTO: 388 10*3/MM3 (ref 140–450)
PMV BLD AUTO: 9.2 FL (ref 6–12)
POTASSIUM SERPL-SCNC: 4.9 MMOL/L (ref 3.5–5.2)
PROT SERPL-MCNC: 7.5 G/DL (ref 6–8.5)
PROT UR QL STRIP: NEGATIVE
RBC # BLD AUTO: 4.46 10*6/MM3 (ref 3.77–5.28)
SODIUM SERPL-SCNC: 137 MMOL/L (ref 136–145)
SP GR UR STRIP: 1.01 (ref 1–1.03)
TIBC SERPL-MCNC: 592 MCG/DL (ref 298–536)
TRANSFERRIN SERPL-MCNC: 397 MG/DL (ref 200–360)
TSH SERPL DL<=0.05 MIU/L-ACNC: 3.09 UIU/ML (ref 0.27–4.2)
URATE SERPL-MCNC: 10.3 MG/DL (ref 2.4–5.7)
UROBILINOGEN UR QL STRIP: NORMAL
WBC NRBC COR # BLD AUTO: 12.02 10*3/MM3 (ref 3.4–10.8)

## 2024-05-13 PROCEDURE — 80050 GENERAL HEALTH PANEL: CPT | Performed by: HOSPITALIST

## 2024-05-13 PROCEDURE — 94799 UNLISTED PULMONARY SVC/PX: CPT

## 2024-05-13 PROCEDURE — 94664 DEMO&/EVAL PT USE INHALER: CPT

## 2024-05-13 PROCEDURE — G0480 DRUG TEST DEF 1-7 CLASSES: HCPCS

## 2024-05-13 PROCEDURE — 25010000002 METHYLPREDNISOLONE PER 40 MG: Performed by: HOSPITALIST

## 2024-05-13 PROCEDURE — 25010000002 FUROSEMIDE PER 20 MG: Performed by: HOSPITALIST

## 2024-05-13 PROCEDURE — G0480 DRUG TEST DEF 1-7 CLASSES: HCPCS | Performed by: HOSPITALIST

## 2024-05-13 PROCEDURE — 84100 ASSAY OF PHOSPHORUS: CPT | Performed by: HOSPITALIST

## 2024-05-13 PROCEDURE — 25010000002 CEFTRIAXONE PER 250 MG: Performed by: HOSPITALIST

## 2024-05-13 PROCEDURE — 83735 ASSAY OF MAGNESIUM: CPT | Performed by: HOSPITALIST

## 2024-05-13 PROCEDURE — 84466 ASSAY OF TRANSFERRIN: CPT | Performed by: INTERNAL MEDICINE

## 2024-05-13 PROCEDURE — 82550 ASSAY OF CK (CPK): CPT | Performed by: INTERNAL MEDICINE

## 2024-05-13 PROCEDURE — 80307 DRUG TEST PRSMV CHEM ANLYZR: CPT | Performed by: HOSPITALIST

## 2024-05-13 PROCEDURE — 82728 ASSAY OF FERRITIN: CPT | Performed by: INTERNAL MEDICINE

## 2024-05-13 PROCEDURE — 84550 ASSAY OF BLOOD/URIC ACID: CPT | Performed by: INTERNAL MEDICINE

## 2024-05-13 PROCEDURE — 76775 US EXAM ABDO BACK WALL LIM: CPT

## 2024-05-13 PROCEDURE — 94761 N-INVAS EAR/PLS OXIMETRY MLT: CPT

## 2024-05-13 PROCEDURE — 99222 1ST HOSP IP/OBS MODERATE 55: CPT | Performed by: INTERNAL MEDICINE

## 2024-05-13 PROCEDURE — 83540 ASSAY OF IRON: CPT | Performed by: INTERNAL MEDICINE

## 2024-05-13 PROCEDURE — 81003 URINALYSIS AUTO W/O SCOPE: CPT | Performed by: INTERNAL MEDICINE

## 2024-05-13 RX ORDER — METHYLPREDNISOLONE SODIUM SUCCINATE 40 MG/ML
40 INJECTION, POWDER, LYOPHILIZED, FOR SOLUTION INTRAMUSCULAR; INTRAVENOUS EVERY 12 HOURS
Status: DISCONTINUED | OUTPATIENT
Start: 2024-05-13 | End: 2024-05-15

## 2024-05-13 RX ORDER — FUROSEMIDE 10 MG/ML
40 INJECTION INTRAMUSCULAR; INTRAVENOUS DAILY
Status: DISCONTINUED | OUTPATIENT
Start: 2024-05-14 | End: 2024-05-14

## 2024-05-13 RX ORDER — IPRATROPIUM BROMIDE AND ALBUTEROL SULFATE 2.5; .5 MG/3ML; MG/3ML
3 SOLUTION RESPIRATORY (INHALATION) EVERY 4 HOURS PRN
Status: DISCONTINUED | OUTPATIENT
Start: 2024-05-13 | End: 2024-05-20 | Stop reason: HOSPADM

## 2024-05-13 RX ADMIN — OXYCODONE 10 MG: 5 TABLET ORAL at 16:31

## 2024-05-13 RX ADMIN — OXYCODONE 10 MG: 5 TABLET ORAL at 04:38

## 2024-05-13 RX ADMIN — METHYLPREDNISOLONE SODIUM SUCCINATE 40 MG: 40 INJECTION, POWDER, FOR SOLUTION INTRAMUSCULAR; INTRAVENOUS at 14:31

## 2024-05-13 RX ADMIN — OXYCODONE 10 MG: 5 TABLET ORAL at 09:36

## 2024-05-13 RX ADMIN — OXYCODONE 10 MG: 5 TABLET ORAL at 20:58

## 2024-05-13 RX ADMIN — METOPROLOL SUCCINATE 25 MG: 25 TABLET, FILM COATED, EXTENDED RELEASE ORAL at 09:36

## 2024-05-13 RX ADMIN — FUROSEMIDE 40 MG: 10 INJECTION, SOLUTION INTRAMUSCULAR; INTRAVENOUS at 09:37

## 2024-05-13 RX ADMIN — IPRATROPIUM BROMIDE AND ALBUTEROL SULFATE 3 ML: .5; 3 SOLUTION RESPIRATORY (INHALATION) at 16:05

## 2024-05-13 RX ADMIN — Medication 10 ML: at 20:58

## 2024-05-13 RX ADMIN — SENNOSIDES AND DOCUSATE SODIUM 2 TABLET: 50; 8.6 TABLET ORAL at 14:53

## 2024-05-13 RX ADMIN — APIXABAN 5 MG: 5 TABLET, FILM COATED ORAL at 09:36

## 2024-05-13 RX ADMIN — Medication 10 ML: at 09:37

## 2024-05-13 RX ADMIN — CEFTRIAXONE 2000 MG: 2 INJECTION, POWDER, FOR SOLUTION INTRAMUSCULAR; INTRAVENOUS at 09:36

## 2024-05-13 RX ADMIN — ASPIRIN 81 MG: 81 TABLET, COATED ORAL at 09:36

## 2024-05-13 RX ADMIN — METHYLPREDNISOLONE SODIUM SUCCINATE 40 MG: 40 INJECTION, POWDER, FOR SOLUTION INTRAMUSCULAR; INTRAVENOUS at 23:52

## 2024-05-13 RX ADMIN — IPRATROPIUM BROMIDE AND ALBUTEROL SULFATE 3 ML: .5; 3 SOLUTION RESPIRATORY (INHALATION) at 06:44

## 2024-05-13 RX ADMIN — BENZONATATE 200 MG: 100 CAPSULE ORAL at 04:38

## 2024-05-13 RX ADMIN — Medication 1 PATCH: at 04:00

## 2024-05-13 RX ADMIN — LOSARTAN POTASSIUM 25 MG: 25 TABLET, FILM COATED ORAL at 09:36

## 2024-05-13 RX ADMIN — IPRATROPIUM BROMIDE AND ALBUTEROL SULFATE 3 ML: .5; 3 SOLUTION RESPIRATORY (INHALATION) at 19:50

## 2024-05-13 RX ADMIN — APIXABAN 5 MG: 5 TABLET, FILM COATED ORAL at 20:58

## 2024-05-13 RX ADMIN — IPRATROPIUM BROMIDE AND ALBUTEROL SULFATE 3 ML: .5; 3 SOLUTION RESPIRATORY (INHALATION) at 11:27

## 2024-05-13 RX ADMIN — AZITHROMYCIN DIHYDRATE 500 MG: 250 TABLET ORAL at 09:36

## 2024-05-13 NOTE — PROGRESS NOTES
[facility]     Progress Note    Patient Name: Jen Cruz  : 1970  MRN: [id number]  Primary Care Physician:  Provider, No Known  Date of admission: 2024  Service date and time: 24 11:45 EDT  Subjective   Subjective     Chief Complaint: SOB    HPI:  Patient Reports cough and SOB      Objective   Objective     Vitals:   Temp:  [98 °F (36.7 °C)-98.3 °F (36.8 °C)] 98.2 °F (36.8 °C)  Heart Rate:  [] 88  Resp:  [18-24] 18  BP: (117-140)/() 131/87  Flow (L/min):  [2] 2  Physical Exam    Constitutional: Awake, alert   Eyes: PERRLA, sclerae anicteric, no conjunctival injection   HENT: NCAT, mucous membranes moist   Neck: Supple, no thyromegaly, no lymphadenopathy, trachea midline   Respiratory: decreased BS, wheezing   Cardiovascular: RRR, no murmurs, rubs, or gallops, palpable pedal pulses bilaterally   Gastrointestinal: Positive bowel sounds, soft, nontender, nondistended   Musculoskeletal: 1+ pitting bilateral ankle edema, no clubbing or cyanosis to extremities   Psychiatric: Appropriate affect, cooperative   Neurologic: Oriented x 3, strength symmetric in all extremities, Cranial Nerves grossly intact to confrontation, speech clear   Skin: No rashes     Result Review    Result Review:  I have personally reviewed the results from the time of this admission to 2024 11:45 EDT and agree with these findings:  [x]  Laboratory list / accordion  [x]  Microbiology  [x]  Radiology  [x]  EKG/Telemetry   [x]  Cardiology/Vascular   []  Pathology  []  Old records  []  Other:        Assessment & Plan   Assessment / Plan       Active Hospital Problems:  Active Hospital Problems    Diagnosis     **Acute on chronic HFrEF (heart failure with reduced ejection fraction)     Rhinovirus infection     CHF exacerbation    Tobacco abuse  Morbid obesity  Hx of DVT/PE    Plan:    - imaging reviewed, cont IV abx, supportive care, supplemental oxygen, IS  - will start IV steroids, likely has underlying COPD as  well, alisha  - IV lasix, cards has been consulted, strict I/O's  - Telemetry  - cont eliquis  - cont home meds as able  - trend labs    DVT prophylaxis:  Medical DVT prophylaxis orders are present.        CODE STATUS:   Code Status (Patient has no pulse and is not breathing): CPR (Attempt to Resuscitate)  Medical Interventions (Patient has pulse or is breathing): Full Support    Disposition:  I expect patient to be discharged 2 days.    Reji Almeida MD

## 2024-05-13 NOTE — PLAN OF CARE
Problem: Adult Inpatient Plan of Care  Goal: Plan of Care Review  Outcome: Ongoing, Progressing  Flowsheets (Taken 5/13/2024 0316)  Progress: improving  Plan of Care Reviewed With: patient  Goal: Patient-Specific Goal (Individualized)  Outcome: Ongoing, Progressing  Goal: Absence of Hospital-Acquired Illness or Injury  Outcome: Ongoing, Progressing  Intervention: Prevent Skin Injury  Recent Flowsheet Documentation  Taken 5/12/2024 2000 by Kristin Singletary, RN  Skin Protection: adhesive use limited  Goal: Optimal Comfort and Wellbeing  Outcome: Ongoing, Progressing  Intervention: Provide Person-Centered Care  Recent Flowsheet Documentation  Taken 5/12/2024 2000 by Kristin Singletary, RN  Trust Relationship/Rapport:   care explained   choices provided  Goal: Readiness for Transition of Care  Outcome: Ongoing, Progressing   Goal Outcome Evaluation:  Plan of Care Reviewed With: patient        Progress: improving

## 2024-05-13 NOTE — PAYOR COMM NOTE
"    =======================  AUTHORIZATION PENDING:   PLEASE FAX OR CALL DETERMINATION TO CONTACT BELOW:       THANK YOU,    MARIANA Reddy, RN  Utilization Review  [facility]  Phone: [phone]  Fax: [phone]      NPI: [id number]  Tax ID: [id number]        Jen Cruz (53 y.o. Female)       Date of Birth   1970    Social Security Number       Address   [address] [place] [address] [place] [address]    Home Phone   [phone]    MRN   [id number]       Mandaen   None    Marital Status                               Admission Date   5/11/24    Admission Type   Emergency    Admitting Provider   Inder Logan MD    Attending Provider   Reji Almeida MD    Department, Room/Bed   [facility], 1114/1       Discharge Date       Discharge Disposition       Discharge Destination                                 Attending Provider: Reji Almeida MD    Allergies: Heparin, Wasp Venom    Isolation: Droplet   Infection: Rhinovirus  (05/11/24)   Code Status: CPR    Ht: 165.1 cm (65\")   Wt: 95.3 kg (210 lb)    Admission Cmt: None   Principal Problem: Acute on chronic HFrEF (heart failure with reduced ejection fraction) [I50.23]                   Active Insurance as of 5/11/2024       Primary Coverage       Payor Plan Insurance Group Employer/Plan Group    [place] [facility] PLAN        Payor Plan Address Payor Plan Phone Number Payor Plan Fax Number Effective Dates    [address] [phone]  5/1/2024 - None Entered    [facility] [id number]         Subscriber Name Subscriber Birth Date Member ID       JEN CRUZ 1970 [id number]                     Emergency Contacts        (Rel.) Home Phone Work Phone Mobile Phone    KipJesus Alberto lund (Daughter) -- -- [phone]    Cydney Connor (Sister) -- -- [phone]                 History & Physical        Inder Logan MD at 05/11/24 2236              [facility]" [facility] Medicine Services      Patient Name: Jen Cruz  : 1970  MRN: [id number]  Primary Care Physician:  Provider, No Known  Date of admission: 2024      Subjective      Chief Complaint: Shortness of breath    History of Present Illness: Jen Cruz is a 53 y.o. female who presented to [facility] on 2024 complaining of progressive shortness of breath and wheezing with some productive cough for last few days.  Patient underwent workup in ER including CT chest PE protocol, revealing no pulmonary emboli, but did reveal multifocal pneumonia in both lung fields, her viral panel came out positive for rhinovirus.  Patient received some IV antibiotics in ER.  Patient also noted to significantly elevated proBNP and CT chest also revealing CHF pattern.  Did receive a dose of Lasix in ER.  Patient says she was in the [facility] from where she was transferred to [facility], patient was diagnosed with the DVT and PE and with some coagulation disorder for which she was put on Eliquis.  Patient also was noted to have cardiomyopathy with ejection fraction around 25%.,  She denies having any cardiac cath or stress test done.  Patient's home medication list reveals she is on metoprolol, Aldactone  And diuretic therapy.  Following this we were asked to the patient for the further care      Review of Systems   All other systems reviewed and are negative.      Personal History     No past medical history on file.  Significant for tobacco abuse, cardiomyopathy, coagulopathy  No past surgical history on file.    Family History: family history is not on file. Otherwise pertinent FHx was reviewed and not pertinent to current issue.    Social History:    Significant for tobacco tobacco abuse for some time, denies alcohol abuse.  Home Medications:  Prior to Admission Medications       None              Allergies:  Allergies   Allergen Reactions    Heparin Rash     Possible rash from  heparin use.    Wasp Venom Shortness Of Breath       Objective      Vitals:   Temp:  [98.1 °F (36.7 °C)] 98.1 °F (36.7 °C)  Heart Rate:  [] 91  Resp:  [26] 26  BP: (118-173)/() 128/84    Physical Exam  Vitals and nursing note reviewed.   Constitutional:       General: She is not in acute distress.     Appearance: Normal appearance. She is well-developed. She is not ill-appearing, toxic-appearing or diaphoretic.   HENT:      Head: Normocephalic and atraumatic.      Right Ear: Ear canal and external ear normal.      Left Ear: Ear canal and external ear normal.      Nose: Nose normal. No congestion or rhinorrhea.      Mouth/Throat:      Mouth: Mucous membranes are moist.      Pharynx: No oropharyngeal exudate.   Eyes:      General: No scleral icterus.        Right eye: No discharge.         Left eye: No discharge.      Extraocular Movements: Extraocular movements intact.      Conjunctiva/sclera: Conjunctivae normal.      Pupils: Pupils are equal, round, and reactive to light.   Neck:      Thyroid: No thyromegaly.      Vascular: No carotid bruit or JVD.      Trachea: No tracheal deviation.   Cardiovascular:      Rate and Rhythm: Normal rate and regular rhythm.      Pulses: Normal pulses.      Heart sounds: Normal heart sounds. No murmur heard.     No friction rub. No gallop.   Pulmonary:      Effort: No respiratory distress.      Breath sounds: No stridor. Wheezing and rales present. No rhonchi.   Chest:      Chest wall: No tenderness.   Abdominal:      General: Bowel sounds are normal. There is no distension.      Palpations: Abdomen is soft. There is no mass.      Tenderness: There is no abdominal tenderness. There is no guarding or rebound.      Hernia: No hernia is present.   Musculoskeletal:         General: No swelling, tenderness, deformity or signs of injury. Normal range of motion.      Cervical back: Normal range of motion and neck supple. No rigidity. No muscular tenderness.      Right lower leg:  No edema.      Left lower leg: No edema.   Lymphadenopathy:      Cervical: No cervical adenopathy.   Skin:     General: Skin is warm and dry.      Coloration: Skin is not jaundiced or pale.      Findings: No bruising, erythema or rash.   Neurological:      General: No focal deficit present.      Mental Status: She is alert and oriented to person, place, and time. Mental status is at baseline.      Cranial Nerves: No cranial nerve deficit.      Sensory: No sensory deficit.      Motor: No weakness or abnormal muscle tone.      Coordination: Coordination normal.   Psychiatric:         Mood and Affect: Mood normal.         Behavior: Behavior normal.         Thought Content: Thought content normal.         Judgment: Judgment normal.          Result Review   Result Review:  I have personally reviewed the results from the time of this admission to 5/11/2024 22:43 EDT and agree with these findings:  [x]  Laboratory  [x]  Microbiology  [x]  Radiology  []  EKG/Telemetry   []  Cardiology/Vascular   []  Pathology  []  Old records  []  Other:  Most notable findings include:       Assessment & Plan        Active Hospital Problems:  Active Hospital Problems    Diagnosis     **CHF exacerbation      Plan:     Hypoxia secondary to bilateral multifocal pneumonia likely viral, will treat the patient empirically with Omnicef and doxycycline orally, monitor clinical progress.    Cardiomyopathy with ejection fraction around 25%/acute on chronic systolic heart failure, Lasix 40 IV 2 times a day, repeat 2D echo, cardiology consult, patient likely will need CAD workup if not done in [place].  Patient noted on metoprolol, losartan and Aldactone, will resume once home medications are reconciled.    Active tobacco abuse, patient counseled for cessation.    Coagulopathy as per patient, patient will need outpatient hematology follow-up upon discharge, continue Eliquis        DVT prophylaxis:  Medical DVT prophylaxis orders are signed and held.  Medical DVT prophylaxis orders are present.        CODE STATUS:    Code Status (Patient has no pulse and is not breathing): CPR (Attempt to Resuscitate)  Medical Interventions (Patient has pulse or is breathing): Full Support    Admission Status:  I believe this patient meets observation status.    I discussed the patient's findings and my recommendations with patient.    This patient has been examined wearing appropriate Personal Protective Equipment and discussed with hospital infection control department. 05/11/24      Signature:     Electronically signed by Inder Logan MD at 05/11/24 9530          Emergency Department Notes        Sherly Grayson PA-C at 05/11/24 0818          Subjective   History of Present Illness  Patient is a 53-year-old female who presents with chest pain shortness of breath worsening over the last several days.  She reports that she was seen in [place] about a month ago and was diagnosed with PE CHF exacerbation.  She is been taking her Xarelto but she is worried about blood clots.  She reports she has been prescribed Lasix but she does not think it is helping.        Review of Systems   Constitutional:  Negative for chills, diaphoresis, fatigue and fever.   HENT:  Negative for congestion, ear pain, sinus pressure, sore throat, trouble swallowing and voice change.    Respiratory:  Positive for cough, chest tightness and shortness of breath.    Cardiovascular:  Positive for chest pain and leg swelling. Negative for palpitations.   Gastrointestinal:  Negative for abdominal distention, nausea and vomiting.   Genitourinary: Negative.    Musculoskeletal: Negative.    Skin: Negative.    Neurological: Negative.    Psychiatric/Behavioral: Negative.         No past medical history on file.    Allergies   Allergen Reactions    Heparin Rash     Possible rash from heparin use.    Wasp Venom Shortness Of Breath       No past surgical history on file.    No family history on file.    Social  "History     Socioeconomic History    Marital status:            Objective   Physical Exam  Constitutional:       General: She is not in acute distress.     Appearance: Normal appearance. She is well-developed. She is ill-appearing. She is not toxic-appearing or diaphoretic.   HENT:      Head: Normocephalic and atraumatic.      Nose: Nose normal.   Eyes:      Conjunctiva/sclera: Conjunctivae normal.   Cardiovascular:      Rate and Rhythm: Normal rate and regular rhythm.   Pulmonary:      Effort: Pulmonary effort is normal. No respiratory distress.      Breath sounds: No stridor. Examination of the right-upper field reveals decreased breath sounds. Examination of the left-upper field reveals decreased breath sounds. Examination of the right-middle field reveals decreased breath sounds. Examination of the left-middle field reveals decreased breath sounds. Decreased breath sounds present. No wheezing, rhonchi or rales.   Musculoskeletal:         General: Normal range of motion.      Cervical back: Normal range of motion.   Skin:     General: Skin is warm and dry.   Neurological:      General: No focal deficit present.      Mental Status: She is alert and oriented to person, place, and time. Mental status is at baseline.   Psychiatric:         Mood and Affect: Mood normal.         Behavior: Behavior normal.         Thought Content: Thought content normal.         Judgment: Judgment normal.         Procedures          ED Course  ED Course as of 05/11/24 2221   Sat May 11, 2024   2220 EKG interpreted by ER physician reviewed by myself.  Sinus tachycardia rate of 118.  Nonspecific T wave abnormalities no previous on file. [MG]      ED Course User Index  [MG] Sherly Grayson, CARLENE                                             Medical Decision Making  Appropriate PPE worn during exam.    /84   Pulse 91   Temp 98.1 °F (36.7 °C) (Oral)   Resp 26   Ht 165.1 cm (65\")   Wt 95.3 kg (210 lb)   SpO2 99%   BMI 34.95 " kg/m²      Co-morbidities --  has no past medical history on file.  Radiology interpretation --  X-rays reviewed by me and independently interpreted by radiologist:  CT Angiogram Chest Pulmonary Embolism    Result Date: 5/11/2024  Impression: 1. No evidence of pulmonary embolism. 2. Bibasilar pneumonia with focal consolidative changes present within the inferior left upper lobe. Follow-up to resolution recommended given the masslike appearance. 3. Mild to moderate underlying pulmonary edema pattern with small right-sided pleural effusion and small to moderate size left-sided pleural effusion. There is cardiomegaly. Anasarca of the soft tissues present. 4. Ancillary findings as described above. Electronically Signed: Sarah Good MD  5/11/2024 10:10 PM EDT  Workstation ID: [id number]    XR Chest 1 View    Result Date: 5/11/2024  Impression: Bibasilar pneumonia with probable mild underlying pulmonary edema pattern and small bilateral pleural effusions. There is mild cardiomegaly.. Electronically Signed: Sarah Good MD  5/11/2024 9:03 PM EDT  Workstation ID: [id number]      Patient is a 53-year-old female who presents with bibasilar pneumonia, pulmonary edema.  Patient's BNP was greater than 20,000, lactic acid 2.1, positive for rhinovirus, troponin 42, white blood cell count 11.3 she was initiated on Rocephin and doxycycline and given Lasix.  She will be admitted to the hospitalist.  I discussed the findings and recommendations with the patient who voices understanding. Stable while in the ER.     Note Disclaimer: At [facility], we believe that sharing information builds trust and better relationships. You are receiving this note because you are receiving care at [facility] or recently visited. It is possible you will see health information before a provider has talked with you about it. This kind of information can be easy to misunderstand. To help you fully understand what it means for your health, we urge you  to discuss this note with your provider.        Problems Addressed:  Acute on chronic congestive heart failure, unspecified heart failure type: complicated acute illness or injury  Chest pain, unspecified type: complicated acute illness or injury  Pneumonia of both lungs due to infectious organism, unspecified part of lung: complicated acute illness or injury  Rhinovirus: complicated acute illness or injury  Shortness of breath: complicated acute illness or injury    Amount and/or Complexity of Data Reviewed  Labs: ordered.  Radiology: ordered.    Risk  Prescription drug management.        Final diagnoses:   Chest pain, unspecified type   Acute on chronic congestive heart failure, unspecified heart failure type   Shortness of breath   Pneumonia of both lungs due to infectious organism, unspecified part of lung   Rhinovirus   Sepsis without acute organ dysfunction, due to unspecified organism       ED Disposition  ED Disposition       ED Disposition   Intended Admit    Condition   --    Comment   --               No follow-up provider specified.       Medication List      No changes were made to your prescriptions during this visit.            Sherly Grayson PA-C  05/11/24 2221      Electronically signed by Sherly Grayson PA-C at 05/11/24 2221       Vital Signs (last day)       Date/Time Temp Temp src Pulse Resp BP Patient Position SpO2    05/13/24 1130 -- -- 88 18 -- -- 97    05/13/24 1127 -- -- 90 18 -- -- 95    05/13/24 0936 -- -- -- -- -- -- 94    05/13/24 0935 -- -- 94 -- 131/87 -- 91    05/13/24 0700 98.2 (36.8) Axillary 99 18 130/112 Lying 100    05/13/24 0648 -- -- 90 19 -- -- 100    05/13/24 0644 -- -- 91 24 -- -- 100    05/13/24 0300 -- -- 89 -- 132/84 -- 96    05/13/24 0100 98.3 (36.8) Oral 94 20 123/76 Lying 93    05/12/24 2300 -- -- -- -- 140/117 -- --    05/12/24 2203 -- -- 100 -- -- -- --    05/12/24 2000 -- -- 100 -- 137/94 -- 97    05/12/24 1934 98 (36.7) Oral 110 20 139/88 Lying 95    05/12/24  1911 -- -- 91 22 -- -- 100    05/12/24 1904 -- -- 107 19 -- -- 90    05/12/24 1730 -- -- 82 -- 134/85 -- 97    05/12/24 1700 -- -- 79 -- 117/80 -- 95    05/12/24 1631 -- -- 85 -- 128/81 -- 94    05/12/24 1601 -- -- 94 -- 135/96 -- --    05/12/24 1544 -- -- 92 -- 138/85 -- 92    05/12/24 1542 -- -- 98 -- 137/100 -- 91    05/12/24 1438 -- -- 100 24 -- -- 100    05/12/24 1433 -- -- 101 21 -- -- 92    05/12/24 1241 -- -- 104 -- 140/95 -- 94    05/12/24 1131 -- -- 106 22 -- -- 100    05/12/24 1127 -- -- 116 24 -- -- 96    05/12/24 1100 -- -- 115 -- 148/98 -- 97    05/12/24 0930 -- -- 105 -- 139/97 -- --    05/12/24 0900 -- -- 106 -- 138/88 -- --    05/12/24 0830 -- -- 106 -- 138/100 -- 98    05/12/24 0805 -- -- 107 -- 155/102 -- 99    05/12/24 0800 -- -- 111 -- 145/102 -- 96    05/12/24 0758 -- -- 113 26 120/96 Lying 93    05/12/24 0332 98.2 (36.8) Oral 102 24 127/84 Sitting 93          Oxygen Therapy (last day)       Date/Time SpO2 Device (Oxygen Therapy) Flow (L/min) Oxygen Concentration (%) ETCO2 (mmHg)    05/13/24 1130 97 room air -- -- --    05/13/24 1127 95 room air -- -- --    05/13/24 0936 94 room air -- -- --    05/13/24 0935 91 -- -- -- --    05/13/24 0700 100 nasal cannula 2 -- --    05/13/24 0648 100 nasal cannula 2 -- --    05/13/24 0644 100 nasal cannula 2 -- --    05/13/24 0300 96 -- -- -- --    05/13/24 0100 93 nasal cannula 2 -- --    05/12/24 2000 97 nasal cannula -- -- --    05/12/24 1934 95 nasal cannula 2 -- --    05/12/24 1911 100 nasal cannula 2 -- --    05/12/24 1904 90 nasal cannula 2 -- --    05/12/24 1730 97 -- -- -- --    05/12/24 1700 95 -- -- -- --    05/12/24 1645 -- nasal cannula 2 -- --    05/12/24 1631 94 -- -- -- --    05/12/24 1544 92 -- -- -- --    05/12/24 1542 91 -- -- -- --    05/12/24 1438 100 nasal cannula 2 -- --    05/12/24 1433 92 nasal cannula 2 -- --    05/12/24 1241 94 nasal cannula 2 -- --    05/12/24 1131 100 nasal cannula 2 -- --    05/12/24 1127 96 nasal cannula 2 -- --     05/12/24 1100 97 -- -- -- --    05/12/24 0840 -- nasal cannula 2 -- --    05/12/24 0830 98 -- -- -- --    05/12/24 0805 99 -- -- -- --    05/12/24 0800 96 -- -- -- --    05/12/24 0758 93 nasal cannula 2 -- --    05/12/24 0332 93 nasal cannula 2 -- --          Facility-Administered Medications as of 5/13/2024   Medication Dose Route Frequency Provider Last Rate Last Admin    apixaban (ELIQUIS) tablet 5 mg  5 mg Oral Q12H Denis Acevedo MD   5 mg at 05/13/24 0936    aspirin EC tablet 81 mg  81 mg Oral Daily Denis Acevedo MD   81 mg at 05/13/24 0936    azithromycin (ZITHROMAX) tablet 500 mg  500 mg Oral Q24H Denis Acevedo MD   500 mg at 05/13/24 0936    benzonatate (TESSALON) capsule 200 mg  200 mg Oral TID PRN Denis Acevedo MD   200 mg at 05/13/24 0438    sennosides-docusate (PERICOLACE) 8.6-50 MG per tablet 2 tablet  2 tablet Oral BID PRN Inder Logan MD   2 tablet at 05/13/24 1453    And    polyethylene glycol (MIRALAX) packet 17 g  17 g Oral Daily PRN Inder Logan MD        And    bisacodyl (DULCOLAX) EC tablet 5 mg  5 mg Oral Daily PRN Inder Logan MD        And    bisacodyl (DULCOLAX) suppository 10 mg  10 mg Rectal Daily PRN Inder Logan MD        Calcium Replacement - Follow Nurse / BPA Driven Protocol   Does not apply PRN Inder Logan MD        [COMPLETED] cefTRIAXone (ROCEPHIN) 2,000 mg in sodium chloride 0.9 % 100 mL MBP  2,000 mg Intravenous Once Sherly Grayson PA-C 200 mL/hr at 05/11/24 2210 2,000 mg at 05/11/24 2210    cefTRIAXone (ROCEPHIN) 2,000 mg in sodium chloride 0.9 % 100 mL MBP  2,000 mg Intravenous Q24H Denis Acevedo  mL/hr at 05/13/24 0936 2,000 mg at 05/13/24 0936    [COMPLETED] doxycycline (VIBRAMYCIN) 100 mg in sodium chloride 0.9 % 100 mL MBP  100 mg Intravenous Once Sherly Grayson PA-C   100 mg at 05/11/24 2210    [START ON 5/14/2024] furosemide (LASIX) injection 40 mg  40 mg Intravenous Daily Jovany  Albert BURTON MD        [COMPLETED] furosemide (LASIX) injection 80 mg  80 mg Intravenous Once Sherly Grayson PA-C   80 mg at 05/11/24 2205    [COMPLETED] HYDROmorphone (DILAUDID) injection 0.5 mg  0.5 mg Intravenous Once Sherly Grayson PA-C   0.5 mg at 05/11/24 2205    [COMPLETED] iopamidol (ISOVUE-370) 76 % injection 100 mL  100 mL Intravenous Once in imaging Sherly Grayson PA-C   100 mL at 05/11/24 2202    ipratropium-albuterol (DUO-NEB) nebulizer solution 3 mL  3 mL Nebulization 4x Daily - RT Denis Acevedo MD   3 mL at 05/13/24 1127    ipratropium-albuterol (DUO-NEB) nebulizer solution 3 mL  3 mL Nebulization Q4H PRN Reji Almeida MD        [COMPLETED] labetalol (NORMODYNE,TRANDATE) injection 20 mg  20 mg Intravenous Once Sherly Grayson PA-C   20 mg at 05/11/24 2141    [COMPLETED] LORazepam (ATIVAN) tablet 1 mg  1 mg Oral Once Denis Acevedo MD   1 mg at 05/12/24 0906    Magnesium Low Dose Replacement - Follow Nurse / BPA Driven Protocol   Does not apply PRN Inder Logan MD        methylPREDNISolone sodium succinate (SOLU-Medrol) injection 40 mg  40 mg Intravenous Q12H Reji Almeida MD   40 mg at 05/13/24 1431    [Held by provider] metoprolol succinate XL (TOPROL-XL) 24 hr tablet 25 mg  25 mg Oral Q12H Denis Acevedo MD   25 mg at 05/13/24 0936    [COMPLETED] morphine injection 2 mg  2 mg Intravenous Once Linda Gerard DO   2 mg at 05/12/24 0800    naloxone (NARCAN) injection 0.4 mg  0.4 mg Intravenous Q5 Min PRN Denis Acevedo MD        nicotine (NICODERM CQ) 21 MG/24HR patch 1 patch  1 patch Transdermal Q24H Linda Gerard DO   1 patch at 05/13/24 0400    [COMPLETED] ondansetron (ZOFRAN) injection 4 mg  4 mg Intravenous Once Sherly Grayson PA-C   4 mg at 05/11/24 2205    oxyCODONE (ROXICODONE) immediate release tablet 10 mg  10 mg Oral Q4H ABHIN Denis Acevedo MD   10 mg at 05/13/24 0936    oxyCODONE (ROXICODONE) immediate release tablet 5 mg  5 mg Oral Q4H  Denis Gamble MD        Phosphorus Replacement - Follow Nurse / BPA Driven Protocol   Does not apply Inder Carrasco MD        Potassium Replacement - Follow Nurse / BPA Driven Protocol   Does not apply Inder Carrasco MD        sodium chloride 0.9 % flush 10 mL  10 mL Intravenous PRN Inder Logan MD        sodium chloride 0.9 % flush 10 mL  10 mL Intravenous Q12H Inder Logan MD   10 mL at 24 0937    sodium chloride 0.9 % flush 10 mL  10 mL Intravenous PRN Inder Logan MD        sodium chloride 0.9 % infusion 40 mL  40 mL Intravenous PRN Inder Logan MD            Physician Progress Notes (last 48 hours)        Reji Almeida MD at 24 1145           [facility]     Progress Note    Patient Name: Jen Cruz  : 1970  MRN: [id number]  Primary Care Physician:  Provider, No Known  Date of admission: 2024  Service date and time: 24 11:45 EDT  Subjective   Subjective     Chief Complaint: SOB    HPI:  Patient Reports cough and SOB      Objective   Objective     Vitals:   Temp:  [98 °F (36.7 °C)-98.3 °F (36.8 °C)] 98.2 °F (36.8 °C)  Heart Rate:  [] 88  Resp:  [18-24] 18  BP: (117-140)/() 131/87  Flow (L/min):  [2] 2  Physical Exam    Constitutional: Awake, alert   Eyes: PERRLA, sclerae anicteric, no conjunctival injection   HENT: NCAT, mucous membranes moist   Neck: Supple, no thyromegaly, no lymphadenopathy, trachea midline   Respiratory: decreased BS, wheezing   Cardiovascular: RRR, no murmurs, rubs, or gallops, palpable pedal pulses bilaterally   Gastrointestinal: Positive bowel sounds, soft, nontender, nondistended   Musculoskeletal: 1+ pitting bilateral ankle edema, no clubbing or cyanosis to extremities   Psychiatric: Appropriate affect, cooperative   Neurologic: Oriented x 3, strength symmetric in all extremities, Cranial Nerves grossly intact to confrontation, speech clear   Skin: No rashes      Result Review    Result Review:  I have personally reviewed the results from the time of this admission to 2024 11:45 EDT and agree with these findings:  [x]  Laboratory list / accordion  [x]  Microbiology  [x]  Radiology  [x]  EKG/Telemetry   [x]  Cardiology/Vascular   []  Pathology  []  Old records  []  Other:        Assessment & Plan   Assessment / Plan       Active Hospital Problems:  Active Hospital Problems    Diagnosis     **Acute on chronic HFrEF (heart failure with reduced ejection fraction)     Rhinovirus infection     CHF exacerbation    Tobacco abuse  Morbid obesity  Hx of DVT/PE    Plan:    - imaging reviewed, cont IV abx, supportive care, supplemental oxygen, IS  - will start IV steroids, likely has underlying COPD as well, duonebs  - IV lasix, cards has been consulted, strict I/O's  - Telemetry  - cont eliquis  - cont home meds as able  - trend labs    DVT prophylaxis:  Medical DVT prophylaxis orders are present.        CODE STATUS:   Code Status (Patient has no pulse and is not breathing): CPR (Attempt to Resuscitate)  Medical Interventions (Patient has pulse or is breathing): Full Support    Disposition:  I expect patient to be discharged 2 days.    Reji Almeida MD    Electronically signed by Reji Almeida MD at 24 1148       Denis Acevedo MD at 24 [facility]8              [facility] MEDICINE SERVICE  DAILY PROGRESS NOTE    NAME: Jen Cruz  : 1970  MRN: [id number]      LOS: 0 days     PROVIDER OF SERVICE: Denis Acevedo MD    Chief Complaint: CHF exacerbation    Subjective:     Interval History:  History taken from: patient chart RN  Shortness of breath or productive cough    Review of Systems:   Review of Systems  All negative except as above  Objective:     Vital Signs  Temp:  [98.1 °F (36.7 °C)-98.2 °F (36.8 °C)] 98.2 °F (36.8 °C)  Heart Rate:  [] 106  Resp:  [24-26] 26  BP: (118-173)/() 138/88  Flow (L/min):  [2] 2   Body mass index  is 34.95 kg/m².    Physical Exam  Physical Exam  AOx3 NAD  RRR S1-S2 audible  Lungs with diminished breath sounds  Abdomen soft nontender nondistended  Bilateral lower extremity with +1 pitting edema bluish discoloration of feet (chronic per patient)  Scheduled Meds   apixaban, 5 mg, Oral, Q12H  [START ON 5/13/2024] azithromycin, 500 mg, Oral, Q24H  cefTRIAXone, 2,000 mg, Intravenous, Q24H  furosemide, 40 mg, Intravenous, Q12H  ipratropium-albuterol, 3 mL, Nebulization, 4x Daily - RT  losartan, 25 mg, Oral, Q24H  metoprolol succinate XL, 25 mg, Oral, Q12H  nicotine, 1 patch, Transdermal, Q24H  sodium chloride, 10 mL, Intravenous, Q12H       PRN Meds     benzonatate    senna-docusate sodium **AND** polyethylene glycol **AND** bisacodyl **AND** bisacodyl    Calcium Replacement - Follow Nurse / BPA Driven Protocol    Magnesium Low Dose Replacement - Follow Nurse / BPA Driven Protocol    oxyCODONE    oxyCODONE    Phosphorus Replacement - Follow Nurse / BPA Driven Protocol    Potassium Replacement - Follow Nurse / BPA Driven Protocol    [COMPLETED] Insert Peripheral IV **AND** sodium chloride    sodium chloride    sodium chloride   Infusions         Diagnostic Data    Results from last 7 days   Lab Units 05/11/24 2053   WBC 10*3/mm3 11.31*   HEMOGLOBIN g/dL 11.4*   HEMATOCRIT % 39.8   PLATELETS 10*3/mm3 350   GLUCOSE mg/dL 155*   CREATININE mg/dL 0.89   BUN mg/dL 11   SODIUM mmol/L 136   POTASSIUM mmol/L 4.2   AST (SGOT) U/L 16   ALT (SGPT) U/L 17   ALK PHOS U/L 103   BILIRUBIN mg/dL 0.8   ANION GAP mmol/L 14.0       CT Angiogram Chest Pulmonary Embolism    Result Date: 5/11/2024  Impression: 1. No evidence of pulmonary embolism. 2. Bibasilar pneumonia with focal consolidative changes present within the inferior left upper lobe. Follow-up to resolution recommended given the masslike appearance. 3. Mild to moderate underlying pulmonary edema pattern with small right-sided pleural effusion and small to moderate size  left-sided pleural effusion. There is cardiomegaly. Anasarca of the soft tissues present. 4. Ancillary findings as described above. Electronically Signed: Sarah Good MD  5/11/2024 10:10 PM EDT  Workstation ID: [id number]    XR Chest 1 View    Result Date: 5/11/2024  Impression: Bibasilar pneumonia with probable mild underlying pulmonary edema pattern and small bilateral pleural effusions. There is mild cardiomegaly.. Electronically Signed: Sarah Good MD  5/11/2024 9:03 PM EDT  Workstation ID: [id number]       I reviewed the patient's new clinical results.  I reviewed the patient's new imaging results and agree with the interpretation.    Assessment/Plan:     Active and Resolved Problems  Active Hospital Problems    Diagnosis  POA    **CHF exacerbation [I50.9]  Yes      Resolved Hospital Problems   No resolved problems to display.       53-year-old female with history of HFrEF, DVT/PE, tobacco abuse, underlying clotting disorder?  Admitted to [facility] 5/11 with progressive shortness of breath and cough      #Acute respiratory distress secondary to viral pneumonia(rhinovirus) and acute on chronic HFrEF.  Unable to rule out bacterial component   #Hypertension  #Tobacco abuse  #Suspect underlying undiagnosed COPD  -RVP positive for human rhinovirus/enterovirus  -proBNP 55850 on admission   -chest x-ray with bibasilar pneumonia with probable mild underlying pulmonary edema pattern and small bilateral pleural effusion  -CTA chest no evidence of PE bibasilar pneumonia and focal consolidation changes inferior upper lobe small to moderate left-sided pleural effusion    Continue IV Lasix 40 twice daily Monitor I's and O's  Cardiology has been consulted  TTE  Continue ceftriaxone change Doxy to azithromycin  Start DuoNebs every 6 scheduled  Supplemental oxygen as needed to keep SpO2 more than 92  Low threshold for steroids  Tessalon Perles as needed for cough  Isolation per protocol  Continue NRT   Resume home dose of  Toprol and losartan  Telemonitoring    #NSTEMI suspect type II  Cardiology consulted as above  Start aspirin      #History of DVT/PE  Continue home dose of Eliquis  Patient reports of an underlying clotting disorder however she is unsure of details  Bluish discoloration of bilateral feet (per patient chronic) concern for underlying PAD will get arterial doppler LE     DVT prophylaxis:  Medical DVT prophylaxis orders are present.         Code status is   Code Status and Medical Interventions:   Ordered at: 05/11/24 2240     Code Status (Patient has no pulse and is not breathing):    CPR (Attempt to Resuscitate)     Medical Interventions (Patient has pulse or is breathing):    Full Support       Plan for disposition: Anticipate discharge in 4 days    Time: 30 minutes    Signature: Electronically signed by Denis Acevedo MD, 05/12/24, 11:18 EDT.  [facility] Hospitalist Team      Electronically signed by Denis Acevedo MD at 05/12/24 1128          Consult Notes (last 48 hours)        Albert Manzo MD at 05/13/24 1147        Consult Orders    1. Inpatient Nephrology Consult [[id number]] ordered by Rob Garrido MD at 05/13/24 1105                     NEPHROLOGY CONSULTATION-----[facility]    [facility]  [phone]  Albert Manzo MD    Patient Care Team:  Provider, No Known as PCP - General    CC/REASON FOR CONSULTATION: Elevated creatinine    PHYSICIAN REQUESTING CONSULTATION: Dr. Garrido    History of Present Illness  53-year-old female with past medical history of hypertension, CHF presented to the hospital on 5/11/2024 with complaints of increased shortness of breath.  Her creatinine on admission was 0.9, today 1.6..  No previous labs available.  She had echo showing severe LV dysfunction with EF around 20%.  CT chest PE protocol was done did not show any pulmonary embolism.  Viral panel was positive for rhinovirus.  Chest x-ray suggestive of  CHF.  She was on losartan and Aldactone prior to admission.  No history of NSAID use.  No history of dysuria or gross hematuria.    Review of Systems   As noted above otherwise 10 system review negative.    Past Medical History:   Diagnosis Date    CHF (congestive heart failure)     GERD (gastroesophageal reflux disease)     Hypertension        History reviewed. No pertinent surgical history.    History reviewed. No pertinent family history.    Social History     Tobacco Use    Smoking status: Every Day     Current packs/day: 0.25     Average packs/day: 0.3 packs/day for 24.4 years (6.1 ttl pk-yrs)     Types: Cigarettes     Start date: 2000    Smokeless tobacco: Never   Vaping Use    Vaping status: Never Used   Substance Use Topics    Alcohol use: Not Currently    Drug use: Yes     Types: Oxycodone       Home Meds:   Medications Prior to Admission   Medication Sig Dispense Refill Last Dose    apixaban (ELIQUIS) 5 MG tablet tablet Take 1 tablet by mouth 2 (Two) Times a Day.   5/11/2024    furosemide (LASIX) 40 MG tablet Take 1 tablet by mouth Daily.   5/11/2024    losartan (COZAAR) 25 MG tablet Take 1 tablet by mouth Daily.   5/11/2024    metoprolol succinate XL (TOPROL-XL) 25 MG 24 hr tablet Take 1 tablet by mouth Daily.   5/11/2024    spironolactone (ALDACTONE) 25 MG tablet Take 1 tablet by mouth Daily.   5/11/2024       Scheduled Meds:  apixaban, 5 mg, Oral, Q12H  aspirin, 81 mg, Oral, Daily  azithromycin, 500 mg, Oral, Q24H  cefTRIAXone, 2,000 mg, Intravenous, Q24H  furosemide, 40 mg, Intravenous, Q12H  ipratropium-albuterol, 3 mL, Nebulization, 4x Daily - RT  losartan, 25 mg, Oral, Q24H  metoprolol succinate XL, 25 mg, Oral, Q12H  nicotine, 1 patch, Transdermal, Q24H  sodium chloride, 10 mL, Intravenous, Q12H        Continuous Infusions:       PRN Meds:    benzonatate    senna-docusate sodium **AND** polyethylene glycol **AND** bisacodyl **AND** bisacodyl    Calcium Replacement - Follow Nurse / BPA Driven  "Protocol    Magnesium Low Dose Replacement - Follow Nurse / BPA Driven Protocol    naloxone    oxyCODONE    oxyCODONE    Phosphorus Replacement - Follow Nurse / BPA Driven Protocol    Potassium Replacement - Follow Nurse / BPA Driven Protocol    [COMPLETED] Insert Peripheral IV **AND** sodium chloride    sodium chloride    sodium chloride    Allergies:  Heparin and Wasp venom    OBJECTIVE    Vital Signs  Temp:  [98 °F (36.7 °C)-98.3 °F (36.8 °C)] 98.2 °F (36.8 °C)  Heart Rate:  [] 88  Resp:  [18-24] 18  BP: (117-140)/() 131/87    I/O this shift:  In: 240 [P.O.:240]  Out: -   No intake/output data recorded.    Physical Exam:  General Appearance: alert, appears stated age and cooperative  Head: normocephalic, without obvious abnormality and atraumatic  Eyes: conjunctivae and sclerae normal and no icterus  Neck: supple and no JVD  Lungs: clear to auscultation and respirations regular  Heart: regular rhythm & normal rate and normal S1, S2  Chest Wall: no abnormalities observed  Abdomen: normal bowel sounds and soft, nontender  Extremities: moves extremities well, no edema, no cyanosis  Skin: no bleeding, bruising or rash  Neurologic: Alert, and oriented. No focal deficits    Results Review:    I reviewed the patient's new clinical results.    WBC WBC   Date Value Ref Range Status   05/13/2024 12.02 (H) 3.40 - 10.80 10*3/mm3 Final   05/11/2024 11.31 (H) 3.40 - 10.80 10*3/mm3 Final      HGB Hemoglobin   Date Value Ref Range Status   05/13/2024 10.3 (L) 12.0 - 15.9 g/dL Final   05/12/2024 12.8 12.0 - 17.0 g/dL Final   05/11/2024 11.4 (L) 12.0 - 15.9 g/dL Final      HCT Hematocrit   Date Value Ref Range Status   05/13/2024 35.8 34.0 - 46.6 % Final   05/12/2024 38 38 - 51 % Final   05/11/2024 39.8 34.0 - 46.6 % Final      Platelets No results found for: \"LABPLAT\"   MCV MCV   Date Value Ref Range Status   05/13/2024 80.3 79.0 - 97.0 fL Final   05/11/2024 79.8 79.0 - 97.0 fL Final          Sodium Sodium   Date " "Value Ref Range Status   05/13/2024 137 136 - 145 mmol/L Final   05/11/2024 136 136 - 145 mmol/L Final      Potassium Potassium   Date Value Ref Range Status   05/13/2024 4.9 3.5 - 5.2 mmol/L Final     Comment:     Slight hemolysis detected by analyzer. Result may be falsely elevated.   05/11/2024 4.2 3.5 - 5.2 mmol/L Final      Chloride Chloride   Date Value Ref Range Status   05/13/2024 96 (L) 98 - 107 mmol/L Final   05/11/2024 97 (L) 98 - 107 mmol/L Final      CO2 CO2   Date Value Ref Range Status   05/13/2024 28.0 22.0 - 29.0 mmol/L Final   05/11/2024 25.0 22.0 - 29.0 mmol/L Final      BUN BUN   Date Value Ref Range Status   05/13/2024 23 (H) 6 - 20 mg/dL Final   05/11/2024 11 6 - 20 mg/dL Final      Creatinine Creatinine   Date Value Ref Range Status   05/13/2024 1.68 (H) 0.57 - 1.00 mg/dL Final   05/12/2024 0.96 0.60 - 1.30 mg/dL Final     Comment:     Serial Number: [id number]:  [id number]   05/11/2024 0.89 0.57 - 1.00 mg/dL Final      Calcium Calcium   Date Value Ref Range Status   05/13/2024 9.2 8.6 - 10.5 mg/dL Final   05/11/2024 9.8 8.6 - 10.5 mg/dL Final      PO4 No results found for: \"CAPO4\"   Albumin Albumin   Date Value Ref Range Status   05/13/2024 3.5 3.5 - 5.2 g/dL Final   05/11/2024 3.7 3.5 - 5.2 g/dL Final      Magnesium Magnesium   Date Value Ref Range Status   05/13/2024 2.4 1.6 - 2.6 mg/dL Final      Uric Acid No results found for: \"URICACID\"       Imaging Results (Last 72 Hours)       Procedure Component Value Units Date/Time    CT Angiogram Chest Pulmonary Embolism [[id number]] Collected: 05/11/24 2207     Updated: 05/11/24 2212    Narrative:      CT ANGIOGRAM CHEST PULMONARY EMBOLISM    Date of Exam: 5/11/2024 10:01 PM EDT    Indication: History of PE - dxs 1 month ago. On Eliquis. Worsening dyspnea and chest pain.    Comparison: 5/11/2024.    Technique: Axial CT images were obtained of the chest after the uneventful intravenous administration of iodinated contrast utilizing pulmonary " embolism protocol.  Sagittal and coronal reconstructions were performed.  Automated exposure control and   iterative reconstruction methods were used.      Findings:    Pulmonary arteries: Adequate opacification of the pulmonary arteries. No evidence of acute pulmonary embolism.    Lungs and Pleura: There is diffuse anasarca of the soft tissues. Small to moderate size left-sided pleural effusion with small right-sided pleural effusion present. Intralobular septal thickening present. Fluid is seen tracking along the major fissures   bilaterally. Patchy consolidative changes present within the subpleural left upper lobe (series 8 image 64) which appears somewhat masslike measuring approximately 3.8 x 4.5 cm (series 8 image 64). Air bronchograms are present. Mild patchy airspace   disease is present within the bilateral lower lobes.    Mediastinum/Emperatriz: No mediastinal or hilar lymphadenopathy.    Lymph nodes: No axillary or supraclavicular adenopathy.    Cardiovascular: The heart appears enlarged.. The pericardium is normal. The aorta and its arch branch vessels are unremarkable.       Upper Abdomen: The upper abdominal contents are unremarkable.          Bones and Soft Tissue: No suspicious osseous lesion.        Impression:      Impression:    1. No evidence of pulmonary embolism.  2. Bibasilar pneumonia with focal consolidative changes present within the inferior left upper lobe. Follow-up to resolution recommended given the masslike appearance.  3. Mild to moderate underlying pulmonary edema pattern with small right-sided pleural effusion and small to moderate size left-sided pleural effusion. There is cardiomegaly. Anasarca of the soft tissues present.  4. Ancillary findings as described above.        Electronically Signed: Sarah Good MD    5/11/2024 10:10 PM EDT    Workstation ID: [id number]    XR Chest 1 View [[id number]] Collected: 05/11/24 2103     Updated: 05/11/24 2105    Narrative:      XR CHEST 1 VW    Date  of Exam: 5/11/2024 9:01 PM EDT    Indication: chest pain    Comparison: None available.    Findings:  The heart appears enlarged. Patchy airspace disease is seen within the bilateral lower lobes in the perihilar region. Probable small bilateral pleural effusions are present. Chronic appearing interstitial changes are present with indistinctness of the   pulmonary vasculature.. No acute osseous abnormality identified.      Impression:      Impression:  Bibasilar pneumonia with probable mild underlying pulmonary edema pattern and small bilateral pleural effusions. There is mild cardiomegaly..      Electronically Signed: Sarah Good MD    5/11/2024 9:03 PM EDT    Workstation ID: [id number]              Results for orders placed during the hospital encounter of 05/11/24    XR Chest 1 View    Narrative  XR CHEST 1 VW    Date of Exam: 5/11/2024 9:01 PM EDT    Indication: chest pain    Comparison: None available.    Findings:  The heart appears enlarged. Patchy airspace disease is seen within the bilateral lower lobes in the perihilar region. Probable small bilateral pleural effusions are present. Chronic appearing interstitial changes are present with indistinctness of the  pulmonary vasculature.. No acute osseous abnormality identified.    Impression  Impression:  Bibasilar pneumonia with probable mild underlying pulmonary edema pattern and small bilateral pleural effusions. There is mild cardiomegaly..      Electronically Signed: Sarah Good MD  5/11/2024 9:03 PM EDT  Workstation ID: [id number]        Results for orders placed during the hospital encounter of 05/11/24    Doppler Arterial Multi Level Lower Extremity - Bilateral CAR    Interpretation Summary    Right Conclusion: The right HALIMA is mildly reduced. Mild digital insufficiency.    Left Conclusion: The left HALIMA is moderately reduced. Mild digital insufficiency.      ASSESSMENT / PLAN      Acute on chronic HFrEF (heart failure with reduced ejection fraction)    CHF  exacerbation    Rhinovirus infection    NICCI-likely ATN in setting of contrast exposure and or diuresis.  Check UA, urine sodium creatinine and renal ultrasound.  Hypertension-hold losartan for now  Congestive heart failure-EF 20%  NSTEMI-Per cardiology.  Will need heart cath at some point  Rhinovirus infection  History of hypercoagulable state    Check UA/urine sodium creatinine and renal ultrasound  Avoid ACE inhibitors or ARB's-stop losartan for now  Cautiously diurese, decrease Lasix to once daily  Would hold off on heart cath until creatinine improved.  Monitor renal function fluid status electrolytes        I discussed the patient's findings and my recommendations with patient and consulting provider    Will follow along closely. Thank you for allowing us to see this patient in renal consultation.    [facility]  [phone]  MD Albert Murrieta MD  05/13/24  11:47 EDT              Electronically signed by Albert Manzo MD at 05/13/24 1404       Rob Garrido MD at 05/13/24 1050        Consult Orders    1. Inpatient Cardiology Consult [[id number]] ordered by Inder Logan MD at 05/11/24 7577                 CARDIOLOGY CONSULT:    Jen Cruz  1970  female  [id number]      Referring Provider: Hospitalist  Reason for Consultation: Shortness of breath and congestive heart failure    Patient Care Team:  Provider, No Known as PCP - General    Chief complaint shortness of breath    Subjective .     History of present illness:  Jen Cruz is a 53 y.o. female with probable HFrEF hypertension and history of DVT and PE in the past presented to the hospital complains of increasing shortness of breath and swelling of the left lower extremity.  Patient also has been having some cough.  No complaint of any chest pains.  No PND orthopnea.  No palpitation dizziness syncope or patient states that she has been in [place] with similar symptoms and was diagnosed  with heart failure and was placed on medical therapy would not have insurance at that time.  She says that she just got insurance and she started taking medicines.  Review of Systems   Constitutional: Negative for fever and malaise/fatigue.   HENT:  Negative for ear pain and nosebleeds.    Eyes:  Negative for blurred vision and double vision.   Cardiovascular:  Positive for leg swelling. Negative for chest pain, dyspnea on exertion and palpitations.   Respiratory:  Positive for cough and shortness of breath.    Skin:  Negative for rash.   Musculoskeletal:  Negative for joint pain.   Gastrointestinal:  Negative for abdominal pain, nausea and vomiting.   Neurological:  Negative for focal weakness and headaches.   Psychiatric/Behavioral:  Negative for depression. The patient is not nervous/anxious.    All other systems reviewed and are negative.      History  Past Medical History:   Diagnosis Date    CHF (congestive heart failure)     GERD (gastroesophageal reflux disease)     Hypertension        History reviewed. No pertinent surgical history.    History reviewed. No pertinent family history.    Social History     Tobacco Use    Smoking status: Every Day     Current packs/day: 0.25     Average packs/day: 0.3 packs/day for 24.4 years (6.1 ttl pk-yrs)     Types: Cigarettes     Start date: 2000    Smokeless tobacco: Never   Vaping Use    Vaping status: Never Used   Substance Use Topics    Alcohol use: Not Currently    Drug use: Yes     Types: Oxycodone        (Not in a hospital admission)      Allergies: Heparin and Wasp venom    Scheduled Meds:apixaban, 5 mg, Oral, Q12H  aspirin, 81 mg, Oral, Daily  azithromycin, 500 mg, Oral, Q24H  cefTRIAXone, 2,000 mg, Intravenous, Q24H  furosemide, 40 mg, Intravenous, Q12H  ipratropium-albuterol, 3 mL, Nebulization, 4x Daily - RT  losartan, 25 mg, Oral, Q24H  metoprolol succinate XL, 25 mg, Oral, Q12H  nicotine, 1 patch, Transdermal, Q24H  sodium chloride, 10 mL, Intravenous,  "Q12H      Continuous Infusions:   PRN Meds:.  benzonatate    senna-docusate sodium **AND** polyethylene glycol **AND** bisacodyl **AND** bisacodyl    Calcium Replacement - Follow Nurse / BPA Driven Protocol    Magnesium Low Dose Replacement - Follow Nurse / BPA Driven Protocol    naloxone    oxyCODONE    oxyCODONE    Phosphorus Replacement - Follow Nurse / BPA Driven Protocol    Potassium Replacement - Follow Nurse / BPA Driven Protocol    [COMPLETED] Insert Peripheral IV **AND** sodium chloride    sodium chloride    sodium chloride    Objective     VITAL SIGNS  Vitals:    05/13/24 0648 05/13/24 0700 05/13/24 0935 05/13/24 0936   BP:  (!) 130/112 131/87    BP Location:  Left arm     Patient Position:  Lying     Pulse: 90 99 94    Resp: 19 18     Temp:  98.2 °F (36.8 °C)     TempSrc:  Axillary     SpO2: 100% 100% 91% 94%   Weight:       Height:           Flowsheet Rows      Flowsheet Row First Filed Value   Admission Height 165.1 cm (65\") Documented at 05/11/2024 2019   Admission Weight 95.3 kg (210 lb) Documented at 05/11/2024 2019             TELEMETRY: Sinus rhythm nonspecific ST segment abnormality    Physical Exam:  Constitutional:       Appearance: Well-developed.   Eyes:      General: No scleral icterus.     Conjunctiva/sclera: Conjunctivae normal.      Pupils: Pupils are equal, round, and reactive to light.   HENT:      Head: Normocephalic and atraumatic.   Neck:      Vascular: No carotid bruit or JVD.   Pulmonary:      Effort: Pulmonary effort is normal.      Breath sounds: Normal breath sounds. No wheezing. No rales.   Cardiovascular:      Normal rate. Regular rhythm.   Pulses:     Intact distal pulses.   Edema:     Peripheral edema present.  Abdominal:      General: Bowel sounds are normal.      Palpations: Abdomen is soft.   Musculoskeletal: Normal range of motion.      Cervical back: Normal range of motion and neck supple. Skin:     General: Skin is warm and dry.      Findings: No rash.   Neurological: "      Mental Status: Alert.      Comments: No focal deficits          Results Review:   I reviewed the patient's new clinical results.  Lab Results (last 24 hours)       Procedure Component Value Units Date/Time    Magnesium [[id number]]  (Normal) Collected: 05/13/24 0450    Specimen: Blood from Arm, Left Updated: 05/13/24 0547     Magnesium 2.4 mg/dL     Comprehensive Metabolic Panel [[id number]]  (Abnormal) Collected: 05/13/24 0450    Specimen: Blood from Arm, Left Updated: 05/13/24 0547     Glucose 118 mg/dL      BUN 23 mg/dL      Creatinine 1.68 mg/dL      Sodium 137 mmol/L      Potassium 4.9 mmol/L      Comment: Slight hemolysis detected by analyzer. Result may be falsely elevated.        Chloride 96 mmol/L      CO2 28.0 mmol/L      Calcium 9.2 mg/dL      Total Protein 7.5 g/dL      Albumin 3.5 g/dL      ALT (SGPT) 17 U/L      AST (SGOT) 24 U/L      Alkaline Phosphatase 113 U/L      Total Bilirubin 0.7 mg/dL      Globulin 4.0 gm/dL      A/G Ratio 0.9 g/dL      BUN/Creatinine Ratio 13.7     Anion Gap 13.0 mmol/L      eGFR 36.4 mL/min/1.73     Narrative:      GFR Normal >60  Chronic Kidney Disease <60  Kidney Failure <15      Phosphorus [[id number]]  (Normal) Collected: 05/13/24 0450    Specimen: Blood from Arm, Left Updated: 05/13/24 0524     Phosphorus 4.0 mg/dL     CBC & Differential [[id number]]  (Abnormal) Collected: 05/13/24 0450    Specimen: Blood from Arm, Left Updated: 05/13/24 0504    Narrative:      The following orders were created for panel order CBC & Differential.  Procedure                               Abnormality         Status                     ---------                               -----------         ------                     CBC Auto Differential[[id number]]        Abnormal            Final result                 Please view results for these tests on the individual orders.    CBC Auto Differential [[id number]]  (Abnormal) Collected: 05/13/24 0450    Specimen: Blood from Arm, Left Updated:  05/13/24 0504     WBC 12.02 10*3/mm3      RBC 4.46 10*6/mm3      Hemoglobin 10.3 g/dL      Hematocrit 35.8 %      MCV 80.3 fL      MCH 23.1 pg      MCHC 28.8 g/dL      RDW 16.8 %      RDW-SD 48.0 fl      MPV 9.2 fL      Platelets 388 10*3/mm3      Neutrophil % 61.3 %      Lymphocyte % 23.0 %      Monocyte % 9.5 %      Eosinophil % 1.6 %      Basophil % 0.6 %      Immature Grans % 4.0 %      Neutrophils, Absolute 7.37 10*3/mm3      Lymphocytes, Absolute 2.77 10*3/mm3      Monocytes, Absolute 1.14 10*3/mm3      Eosinophils, Absolute 0.19 10*3/mm3      Basophils, Absolute 0.07 10*3/mm3      Immature Grans, Absolute 0.48 10*3/mm3      nRBC 0.2 /100 WBC     ToxAssure Flex 14, Urine - Urine, Clean Catch [[id number]] Collected: 05/13/24 0022    Specimen: Urine, Clean Catch Updated: 05/13/24 0027    Blood Culture - Blood, Arm, Right [[id number]]  (Normal) Collected: 05/11/24 2147    Specimen: Blood from Arm, Right Updated: 05/12/24 2201     Blood Culture No growth at 24 hours    Blood Culture - Blood, Arm, Left [[id number]]  (Normal) Collected: 05/11/24 2147    Specimen: Blood from Arm, Left Updated: 05/12/24 2201     Blood Culture No growth at 24 hours    Blood Gas, Arterial - [[id number]]  (Abnormal) Collected: 05/12/24 1725    Specimen: Arterial Blood Updated: 05/12/24 1730     Site Left Radial     Anthony's Test Positive     pH, Arterial 7.295 pH units      pCO2, Arterial 64.2 mm Hg      pO2, Arterial 91.8 mm Hg      HCO3, Arterial 31.2 mmol/L      Base Excess, Arterial 3.0 mmol/L      Comment: Serial Number: [id number]:  [id number]        O2 Saturation, Arterial 95.8 %      Barometric Pressure for Blood Gas --     Comment: N/A        Modality Cannula     FIO2 28 %      Hemodilution No    POC Creatinine [[id number]]  (Normal) Collected: 05/12/24 1725    Specimen: Arterial Blood Updated: 05/12/24 1730     Creatinine 0.96 mg/dL      Comment: Serial Number: [id number]:  [id number]        eGFR 71.3 mL/min/1.73     POCT Electrolytes  +HGB +HCT [[id number]]  (Abnormal) Collected: 05/12/24 1725    Specimen: Arterial Blood Updated: 05/12/24 1730     Sodium 136 mmol/L      POC Potassium 4.4 mmol/L      Ionized Calcium 1.24 mmol/L      Comment: Serial Number: [id number]:  [id number]        Glucose 117 mg/dL      Hematocrit 38 %      Hemoglobin 12.8 g/dL     POC Lactate [[id number]]  (Normal) Collected: 05/12/24 1725    Specimen: Arterial Blood Updated: 05/12/24 1730     Lactate 0.3 mmol/L      Comment: Serial Number: [id number]:  [id number]       POC Glucose Once [[id number]]  (Abnormal) Collected: 05/12/24 1725    Specimen: Arterial Blood Updated: 05/12/24 1730     Glucose 117 mg/dL      Comment: Serial Number: [id number]:  [id number]               Imaging Results (Last 24 Hours)       ** No results found for the last 24 hours. **            EKG      I personally viewed and interpreted the patient's EKG/Telemetry data:    ECHOCARDIOGRAM:  Results for orders placed during the hospital encounter of 05/11/24    Adult Transthoracic Echo Complete w/ Color, Spectral and Contrast if Necessary Per Protocol    Interpretation Summary    Left ventricular systolic function is severely decreased. Calculated left ventricular 3D EF = 18% Left ventricular ejection fraction appears to be less than 20%.    Left ventricular diastolic function is consistent with (grade II w/high LAP) pseudonormalization.    Severely reduced right ventricular systolic function noted.    The right ventricular cavity is moderately dilated.    The left atrial cavity is moderate to severely dilated.    The right atrial cavity is moderate to severely  dilated.    Moderate tricuspid valve regurgitation is present.    Estimated right ventricular systolic pressure from tricuspid regurgitation is moderately elevated (45-55 mmHg).    Moderate to severe pulmonary hypertension is present.         STRESS MYOVIEW:       CARDIAC CATHETERIZATION:    No results found for this or any previous visit.        OTHER:         MDM      HFrEF  Patient has congestive heart failure LV systolic dysfunction EF of 20%  Patient is not on any medicines  Patient will be started on low-dose beta-blockers but cannot have ACE inhibitor's right now because of renal insufficiency  Patient also on IV Lasix but will monitor her renal function    Non-STEMI  Patient had elevated troponin but could be type II MI but will need a cardiac evaluation because of her new diagnosis of heart failure  Patient is on aspirin and will check her lipid levels and also placed on statins.    History of DVT and pulmonary embolism  Patient was admitted in [facility] for the same and was on Eliquis but did not take it regularly because of lack of insurance.  She will take her Eliquis now because she has insurance.  Patient had an echocardiogram which showed severe right ventricular systolic dysfunction also.    Acute respiratory failure  Patient has shortness of breath or viral pneumonia with rhinovirus but also had recent PE with right ventricular strain and dysfunction  Patient is on oxygen and her proBNP is also elevated and is on Lasix  Patient also on antibiotics for pneumonia.    Hypertension  Patient's blood pressure is currently stable on beta-blockers and losartan    I discussed the patients findings and my recommendations with patient and nurse    Rob Garrido MD  05/13/24  10:50 EDT              Electronically signed by Rob Garrido MD at 05/13/24 7460

## 2024-05-13 NOTE — CONSULTS
NEPHROLOGY CONSULTATION-----[facility]    [facility]  [phone]  Albert Manzo MD    Patient Care Team:  Provider, No Known as PCP - General    CC/REASON FOR CONSULTATION: Elevated creatinine    PHYSICIAN REQUESTING CONSULTATION: Dr. Garrido    History of Present Illness  53-year-old female with past medical history of hypertension, CHF presented to the hospital on 5/11/2024 with complaints of increased shortness of breath.  Her creatinine on admission was 0.9, today 1.6..  No previous labs available.  She had echo showing severe LV dysfunction with EF around 20%.  CT chest PE protocol was done did not show any pulmonary embolism.  Viral panel was positive for rhinovirus.  Chest x-ray suggestive of CHF.  She was on losartan and Aldactone prior to admission.  No history of NSAID use.  No history of dysuria or gross hematuria.    Review of Systems   As noted above otherwise 10 system review negative.    Past Medical History:   Diagnosis Date    CHF (congestive heart failure)     GERD (gastroesophageal reflux disease)     Hypertension        History reviewed. No pertinent surgical history.    History reviewed. No pertinent family history.    Social History     Tobacco Use    Smoking status: Every Day     Current packs/day: 0.25     Average packs/day: 0.3 packs/day for 24.4 years (6.1 ttl pk-yrs)     Types: Cigarettes     Start date: 2000    Smokeless tobacco: Never   Vaping Use    Vaping status: Never Used   Substance Use Topics    Alcohol use: Not Currently    Drug use: Yes     Types: Oxycodone       Home Meds:   Medications Prior to Admission   Medication Sig Dispense Refill Last Dose    apixaban (ELIQUIS) 5 MG tablet tablet Take 1 tablet by mouth 2 (Two) Times a Day.   5/11/2024    furosemide (LASIX) 40 MG tablet Take 1 tablet by mouth Daily.   5/11/2024    losartan (COZAAR) 25 MG tablet Take 1 tablet by mouth Daily.   5/11/2024    metoprolol succinate  XL (TOPROL-XL) 25 MG 24 hr tablet Take 1 tablet by mouth Daily.   5/11/2024    spironolactone (ALDACTONE) 25 MG tablet Take 1 tablet by mouth Daily.   5/11/2024       Scheduled Meds:  apixaban, 5 mg, Oral, Q12H  aspirin, 81 mg, Oral, Daily  azithromycin, 500 mg, Oral, Q24H  cefTRIAXone, 2,000 mg, Intravenous, Q24H  furosemide, 40 mg, Intravenous, Q12H  ipratropium-albuterol, 3 mL, Nebulization, 4x Daily - RT  losartan, 25 mg, Oral, Q24H  metoprolol succinate XL, 25 mg, Oral, Q12H  nicotine, 1 patch, Transdermal, Q24H  sodium chloride, 10 mL, Intravenous, Q12H        Continuous Infusions:       PRN Meds:    benzonatate    senna-docusate sodium **AND** polyethylene glycol **AND** bisacodyl **AND** bisacodyl    Calcium Replacement - Follow Nurse / BPA Driven Protocol    Magnesium Low Dose Replacement - Follow Nurse / BPA Driven Protocol    naloxone    oxyCODONE    oxyCODONE    Phosphorus Replacement - Follow Nurse / BPA Driven Protocol    Potassium Replacement - Follow Nurse / BPA Driven Protocol    [COMPLETED] Insert Peripheral IV **AND** sodium chloride    sodium chloride    sodium chloride    Allergies:  Heparin and Wasp venom    OBJECTIVE    Vital Signs  Temp:  [98 °F (36.7 °C)-98.3 °F (36.8 °C)] 98.2 °F (36.8 °C)  Heart Rate:  [] 88  Resp:  [18-24] 18  BP: (117-140)/() 131/87    I/O this shift:  In: 240 [P.O.:240]  Out: -   No intake/output data recorded.    Physical Exam:  General Appearance: alert, appears stated age and cooperative  Head: normocephalic, without obvious abnormality and atraumatic  Eyes: conjunctivae and sclerae normal and no icterus  Neck: supple and no JVD  Lungs: clear to auscultation and respirations regular  Heart: regular rhythm & normal rate and normal S1, S2  Chest Wall: no abnormalities observed  Abdomen: normal bowel sounds and soft, nontender  Extremities: moves extremities well, no edema, no cyanosis  Skin: no bleeding, bruising or rash  Neurologic: Alert, and oriented. No  "focal deficits    Results Review:    I reviewed the patient's new clinical results.    WBC WBC   Date Value Ref Range Status   05/13/2024 12.02 (H) 3.40 - 10.80 10*3/mm3 Final   05/11/2024 11.31 (H) 3.40 - 10.80 10*3/mm3 Final      HGB Hemoglobin   Date Value Ref Range Status   05/13/2024 10.3 (L) 12.0 - 15.9 g/dL Final   05/12/2024 12.8 12.0 - 17.0 g/dL Final   05/11/2024 11.4 (L) 12.0 - 15.9 g/dL Final      HCT Hematocrit   Date Value Ref Range Status   05/13/2024 35.8 34.0 - 46.6 % Final   05/12/2024 38 38 - 51 % Final   05/11/2024 39.8 34.0 - 46.6 % Final      Platelets No results found for: \"LABPLAT\"   MCV MCV   Date Value Ref Range Status   05/13/2024 80.3 79.0 - 97.0 fL Final   05/11/2024 79.8 79.0 - 97.0 fL Final          Sodium Sodium   Date Value Ref Range Status   05/13/2024 137 136 - 145 mmol/L Final   05/11/2024 136 136 - 145 mmol/L Final      Potassium Potassium   Date Value Ref Range Status   05/13/2024 4.9 3.5 - 5.2 mmol/L Final     Comment:     Slight hemolysis detected by analyzer. Result may be falsely elevated.   05/11/2024 4.2 3.5 - 5.2 mmol/L Final      Chloride Chloride   Date Value Ref Range Status   05/13/2024 96 (L) 98 - 107 mmol/L Final   05/11/2024 97 (L) 98 - 107 mmol/L Final      CO2 CO2   Date Value Ref Range Status   05/13/2024 28.0 22.0 - 29.0 mmol/L Final   05/11/2024 25.0 22.0 - 29.0 mmol/L Final      BUN BUN   Date Value Ref Range Status   05/13/2024 23 (H) 6 - 20 mg/dL Final   05/11/2024 11 6 - 20 mg/dL Final      Creatinine Creatinine   Date Value Ref Range Status   05/13/2024 1.68 (H) 0.57 - 1.00 mg/dL Final   05/12/2024 0.96 0.60 - 1.30 mg/dL Final     Comment:     Serial Number: [id number]:  [id number]   05/11/2024 0.89 0.57 - 1.00 mg/dL Final      Calcium Calcium   Date Value Ref Range Status   05/13/2024 9.2 8.6 - 10.5 mg/dL Final   05/11/2024 9.8 8.6 - 10.5 mg/dL Final      PO4 No results found for: \"CAPO4\"   Albumin Albumin   Date Value Ref Range Status   05/13/2024 3.5 " "3.5 - 5.2 g/dL Final   05/11/2024 3.7 3.5 - 5.2 g/dL Final      Magnesium Magnesium   Date Value Ref Range Status   05/13/2024 2.4 1.6 - 2.6 mg/dL Final      Uric Acid No results found for: \"URICACID\"       Imaging Results (Last 72 Hours)       Procedure Component Value Units Date/Time    CT Angiogram Chest Pulmonary Embolism [[id number]] Collected: 05/11/24 2207     Updated: 05/11/24 2212    Narrative:      CT ANGIOGRAM CHEST PULMONARY EMBOLISM    Date of Exam: 5/11/2024 10:01 PM EDT    Indication: History of PE - dxs 1 month ago. On Eliquis. Worsening dyspnea and chest pain.    Comparison: 5/11/2024.    Technique: Axial CT images were obtained of the chest after the uneventful intravenous administration of iodinated contrast utilizing pulmonary embolism protocol.  Sagittal and coronal reconstructions were performed.  Automated exposure control and   iterative reconstruction methods were used.      Findings:    Pulmonary arteries: Adequate opacification of the pulmonary arteries. No evidence of acute pulmonary embolism.    Lungs and Pleura: There is diffuse anasarca of the soft tissues. Small to moderate size left-sided pleural effusion with small right-sided pleural effusion present. Intralobular septal thickening present. Fluid is seen tracking along the major fissures   bilaterally. Patchy consolidative changes present within the subpleural left upper lobe (series 8 image 64) which appears somewhat masslike measuring approximately 3.8 x 4.5 cm (series 8 image 64). Air bronchograms are present. Mild patchy airspace   disease is present within the bilateral lower lobes.    Mediastinum/Emperatriz: No mediastinal or hilar lymphadenopathy.    Lymph nodes: No axillary or supraclavicular adenopathy.    Cardiovascular: The heart appears enlarged.. The pericardium is normal. The aorta and its arch branch vessels are unremarkable.       Upper Abdomen: The upper abdominal contents are unremarkable.          Bones and Soft Tissue: " No suspicious osseous lesion.        Impression:      Impression:    1. No evidence of pulmonary embolism.  2. Bibasilar pneumonia with focal consolidative changes present within the inferior left upper lobe. Follow-up to resolution recommended given the masslike appearance.  3. Mild to moderate underlying pulmonary edema pattern with small right-sided pleural effusion and small to moderate size left-sided pleural effusion. There is cardiomegaly. Anasarca of the soft tissues present.  4. Ancillary findings as described above.        Electronically Signed: Sarah Good MD    5/11/2024 10:10 PM EDT    Workstation ID: [id number]    XR Chest 1 View [[id number]] Collected: 05/11/24 2103     Updated: 05/11/24 2105    Narrative:      XR CHEST 1 VW    Date of Exam: 5/11/2024 9:01 PM EDT    Indication: chest pain    Comparison: None available.    Findings:  The heart appears enlarged. Patchy airspace disease is seen within the bilateral lower lobes in the perihilar region. Probable small bilateral pleural effusions are present. Chronic appearing interstitial changes are present with indistinctness of the   pulmonary vasculature.. No acute osseous abnormality identified.      Impression:      Impression:  Bibasilar pneumonia with probable mild underlying pulmonary edema pattern and small bilateral pleural effusions. There is mild cardiomegaly..      Electronically Signed: Sarah Good MD    5/11/2024 9:03 PM EDT    Workstation ID: [id number]              Results for orders placed during the hospital encounter of 05/11/24    XR Chest 1 View    Narrative  XR CHEST 1 VW    Date of Exam: 5/11/2024 9:01 PM EDT    Indication: chest pain    Comparison: None available.    Findings:  The heart appears enlarged. Patchy airspace disease is seen within the bilateral lower lobes in the perihilar region. Probable small bilateral pleural effusions are present. Chronic appearing interstitial changes are present with indistinctness of  the  pulmonary vasculature.. No acute osseous abnormality identified.    Impression  Impression:  Bibasilar pneumonia with probable mild underlying pulmonary edema pattern and small bilateral pleural effusions. There is mild cardiomegaly..      Electronically Signed: Sarah Good MD  5/11/2024 9:03 PM EDT  Workstation ID: [id number]        Results for orders placed during the hospital encounter of 05/11/24    Doppler Arterial Multi Level Lower Extremity - Bilateral CAR    Interpretation Summary    Right Conclusion: The right HALIMA is mildly reduced. Mild digital insufficiency.    Left Conclusion: The left HALIMA is moderately reduced. Mild digital insufficiency.      ASSESSMENT / PLAN      Acute on chronic HFrEF (heart failure with reduced ejection fraction)    CHF exacerbation    Rhinovirus infection    NICCI-likely ATN in setting of contrast exposure and or diuresis.  Check UA, urine sodium creatinine and renal ultrasound.  Hypertension-hold losartan for now  Congestive heart failure-EF 20%  NSTEMI-Per cardiology.  Will need heart cath at some point  Rhinovirus infection  History of hypercoagulable state    Check UA/urine sodium creatinine and renal ultrasound  Avoid ACE inhibitors or ARB's-stop losartan for now  Cautiously diurese, decrease Lasix to once daily  Would hold off on heart cath until creatinine improved.  Monitor renal function fluid status electrolytes        I discussed the patient's findings and my recommendations with patient and consulting provider    Will follow along closely. Thank you for allowing us to see this patient in renal consultation.    [facility]  [phone]  MD Albert Murrieta MD  05/13/24  11:47 EDT

## 2024-05-13 NOTE — CASE MANAGEMENT/SOCIAL WORK
Discharge Planning Assessment   Zaheer     Patient Name: Jen Cruz  MRN: [id number]  Today's Date: 5/13/2024    Admit Date: 5/11/2024    Plan: D/c Plan: Return home with sister. Needs PCP and Cardiology appointment before d/c. Watch for home O2 needs.   Discharge Needs Assessment       Row Name 05/13/24 1010       Living Environment    People in Home sibling(s)    Name(s) of People in Home Cydney    Current Living Arrangements apartment    Potentially Unsafe Housing Conditions none    In the past 12 months has the electric, gas, oil, or water company threatened to shut off services in your home? No    Primary Care Provided by self    Provides Primary Care For no one    Family Caregiver if Needed child(meagan), adult;sibling(s)    Family Caregiver Names Jesus Alberto and Cydney    Quality of Family Relationships helpful;involved;supportive    Able to Return to Prior Arrangements yes    Living Arrangement Comments Currently living with sister. New to the area       Resource/Environmental Concerns    Resource/Environmental Concerns none    Transportation Concerns no car;other (see comments)  SW notified       Transition Planning    Patient/Family Anticipates Transition to home with family    Patient/Family Anticipated Services at Transition none    Transportation Anticipated family or friend will provide       Discharge Needs Assessment    Readmission Within the Last 30 Days no previous admission in last 30 days    Equipment Currently Used at Home none    Concerns to be Addressed discharge planning    Anticipated Changes Related to Illness inability to work  would like to try to apply for Disability. SHARATH notified.    Equipment Needed After Discharge oxygen    Provided Post Acute Provider List? N/A    Provided Post Acute Provider Quality & Resource List? N/A                   Discharge Plan       Row Name 05/13/24 1013       Plan    Plan D/c Plan: Return home with sister. Needs PCP and Cardiology appointment before d/c. Watch  for home O2 needs.    Provided Post Acute Provider List? N/A    Provided Post Acute Provider Quality & Resource List? N/A    Plan Comments CM spoke with patient at bedside to discuss admission assessment and discharge planning. Patient confirms PCP and pharmacy. Patient has declined meds to bed program at this time. Patient denies any difficulty affording medications at this time. Patient denies any additional needs for DME at this time. The patient will need a PCP and a Cardiology appointment before d/c. CM completed the SDOH and notified the SW that the patient does not have a car and is wanting to apply for Disability. D/c barriers: NC 2L O2, IV medications.               Expected Discharge Date and Time       Expected Discharge Date Expected Discharge Time    May 14, 2024            Demographic Summary       Row Name 05/13/24 1008       General Information    Admission Type inpatient    Arrived From emergency department    Referral Source admission list    Reason for Consult discharge planning    Preferred Language English       Contact Information    Permission Granted to Share Info With                    Functional Status       Row Name 05/13/24 1009       Functional Status    Usual Activity Tolerance good    Current Activity Tolerance good       Functional Status, IADL    Medications independent    Meal Preparation independent    Housekeeping independent    Laundry independent    Shopping independent       Mental Status    General Appearance WDL WDL       Mental Status Summary    Recent Changes in Mental Status/Cognitive Functioning no changes       Employment/    Employment Status unemployed;, previous service           Current or Previous  Service none                  Social Determinants of Health     Tobacco Use: High Risk (5/12/2024)    Patient History     Smoking Tobacco Use: Every Day     Smokeless Tobacco Use: Never     Passive Exposure: Not on file    Alcohol Use: Not At Risk (5/13/2024)    AUDIT-C     Frequency of Alcohol Consumption: Never     Average Number of Drinks: Patient does not drink     Frequency of Binge Drinking: Never   Financial Resource Strain: Low Risk  (5/13/2024)    Overall Financial Resource Strain (CARDIA)     Difficulty of Paying Living Expenses: Not hard at all   Food Insecurity: No Food Insecurity (5/13/2024)    Hunger Vital Sign     Worried About Running Out of Food in the Last Year: Never true     Ran Out of Food in the Last Year: Never true   Transportation Needs: Unmet Transportation Needs (5/13/2024)    PRAPARE - Transportation     Lack of Transportation (Medical): Yes     Lack of Transportation (Non-Medical): Yes   Physical Activity: Inactive (5/13/2024)    Exercise Vital Sign     Days of Exercise per Week: 0 days     Minutes of Exercise per Session: 0 min   Stress: Stress Concern Present (5/13/2024)    [employer] - Occupational Stress Questionnaire     Feeling of Stress : Very much   Social Connections: Not At Risk (5/13/2024)    Family and Community Support     Help with Day-to-Day Activities: I don't need any help     Lonely or Isolated: Never   Interpersonal Safety: Not At Risk (5/13/2024)    Abuse Screen     Unsafe at Home or Work/School: no     Feels Threatened by Someone?: no     Does Anyone Keep You from Contacting Others or Doint Things Outside the Home?: no     Physical Sign of Abuse Present: no   Depression: Not at risk (5/13/2024)    PHQ-2     PHQ-2 Score: 0   Housing Stability: Not At Risk (5/13/2024)    Housing Stability     Current Living Arrangements: apartment     Potentially Unsafe Housing Conditions: none   Utilities: Not At Risk (5/13/2024)    [facility] Utilities     Threatened with loss of utilities: No   Health Literacy: Not At Risk (5/13/2024)    Education     Help with school or training?: No     Preferred Language: English   Employment: Not At Risk (5/13/2024)    Employment     Do  you want help finding or keeping work or a job?: I do not need or want help   Disabilities: Not At Risk (5/13/2024)    Disabilities     Concentrating, Remembering, or Making Decisions Difficulty: no     Doing Errands Independently Difficulty: no        Raisa Amador RN     [address] [address]  Phone: [phone]  Fax: [phone]

## 2024-05-13 NOTE — PLAN OF CARE
"Goal Outcome Evaluation:         Pt A&Ox4, VS stable, pt on 4L NC. Pt non-compliant to keeping O2 in place throughout the entire day, pt stated to me this AM \"I know when I need it\". Nephrology and cardiology consulted. Renal U/S completed. Will continue to monitor.   "

## 2024-05-13 NOTE — CONSULTS
CARDIOLOGY CONSULT:    Jen Cruz  1970  female  [id number]      Referring Provider: Hospitalist  Reason for Consultation: Shortness of breath and congestive heart failure    Patient Care Team:  Provider, No Known as PCP - General    Chief complaint shortness of breath    Subjective .     History of present illness:  Jen Cruz is a 53 y.o. female with probable HFrEF hypertension and history of DVT and PE in the past presented to the hospital complains of increasing shortness of breath and swelling of the left lower extremity.  Patient also has been having some cough.  No complaint of any chest pains.  No PND orthopnea.  No palpitation dizziness syncope or patient states that she has been in [place] with similar symptoms and was diagnosed with heart failure and was placed on medical therapy would not have insurance at that time.  She says that she just got insurance and she started taking medicines.  Review of Systems   Constitutional: Negative for fever and malaise/fatigue.   HENT:  Negative for ear pain and nosebleeds.    Eyes:  Negative for blurred vision and double vision.   Cardiovascular:  Positive for leg swelling. Negative for chest pain, dyspnea on exertion and palpitations.   Respiratory:  Positive for cough and shortness of breath.    Skin:  Negative for rash.   Musculoskeletal:  Negative for joint pain.   Gastrointestinal:  Negative for abdominal pain, nausea and vomiting.   Neurological:  Negative for focal weakness and headaches.   Psychiatric/Behavioral:  Negative for depression. The patient is not nervous/anxious.    All other systems reviewed and are negative.      History  Past Medical History:   Diagnosis Date    CHF (congestive heart failure)     GERD (gastroesophageal reflux disease)     Hypertension        History reviewed. No pertinent surgical history.    History reviewed. No pertinent family history.    Social History     Tobacco Use    Smoking status: Every Day     Current packs/day:  "0.25     Average packs/day: 0.3 packs/day for 24.4 years (6.1 ttl pk-yrs)     Types: Cigarettes     Start date: 2000    Smokeless tobacco: Never   Vaping Use    Vaping status: Never Used   Substance Use Topics    Alcohol use: Not Currently    Drug use: Yes     Types: Oxycodone        (Not in a hospital admission)      Allergies: Heparin and Wasp venom    Scheduled Meds:apixaban, 5 mg, Oral, Q12H  aspirin, 81 mg, Oral, Daily  azithromycin, 500 mg, Oral, Q24H  cefTRIAXone, 2,000 mg, Intravenous, Q24H  furosemide, 40 mg, Intravenous, Q12H  ipratropium-albuterol, 3 mL, Nebulization, 4x Daily - RT  losartan, 25 mg, Oral, Q24H  metoprolol succinate XL, 25 mg, Oral, Q12H  nicotine, 1 patch, Transdermal, Q24H  sodium chloride, 10 mL, Intravenous, Q12H      Continuous Infusions:   PRN Meds:.  benzonatate    senna-docusate sodium **AND** polyethylene glycol **AND** bisacodyl **AND** bisacodyl    Calcium Replacement - Follow Nurse / BPA Driven Protocol    Magnesium Low Dose Replacement - Follow Nurse / BPA Driven Protocol    naloxone    oxyCODONE    oxyCODONE    Phosphorus Replacement - Follow Nurse / BPA Driven Protocol    Potassium Replacement - Follow Nurse / BPA Driven Protocol    [COMPLETED] Insert Peripheral IV **AND** sodium chloride    sodium chloride    sodium chloride    Objective     VITAL SIGNS  Vitals:    05/13/24 0648 05/13/24 0700 05/13/24 0935 05/13/24 0936   BP:  (!) 130/112 131/87    BP Location:  Left arm     Patient Position:  Lying     Pulse: 90 99 94    Resp: 19 18     Temp:  98.2 °F (36.8 °C)     TempSrc:  Axillary     SpO2: 100% 100% 91% 94%   Weight:       Height:           Flowsheet Rows      Flowsheet Row First Filed Value   Admission Height 165.1 cm (65\") Documented at 05/11/2024 2019   Admission Weight 95.3 kg (210 lb) Documented at 05/11/2024 2019             TELEMETRY: Sinus rhythm nonspecific ST segment abnormality    Physical Exam:  Constitutional:       Appearance: Well-developed.   Eyes:      " General: No scleral icterus.     Conjunctiva/sclera: Conjunctivae normal.      Pupils: Pupils are equal, round, and reactive to light.   HENT:      Head: Normocephalic and atraumatic.   Neck:      Vascular: No carotid bruit or JVD.   Pulmonary:      Effort: Pulmonary effort is normal.      Breath sounds: Normal breath sounds. No wheezing. No rales.   Cardiovascular:      Normal rate. Regular rhythm.   Pulses:     Intact distal pulses.   Edema:     Peripheral edema present.  Abdominal:      General: Bowel sounds are normal.      Palpations: Abdomen is soft.   Musculoskeletal: Normal range of motion.      Cervical back: Normal range of motion and neck supple. Skin:     General: Skin is warm and dry.      Findings: No rash.   Neurological:      Mental Status: Alert.      Comments: No focal deficits          Results Review:   I reviewed the patient's new clinical results.  Lab Results (last 24 hours)       Procedure Component Value Units Date/Time    Magnesium [[id number]]  (Normal) Collected: 05/13/24 0450    Specimen: Blood from Arm, Left Updated: 05/13/24 0547     Magnesium 2.4 mg/dL     Comprehensive Metabolic Panel [[id number]]  (Abnormal) Collected: 05/13/24 0450    Specimen: Blood from Arm, Left Updated: 05/13/24 0547     Glucose 118 mg/dL      BUN 23 mg/dL      Creatinine 1.68 mg/dL      Sodium 137 mmol/L      Potassium 4.9 mmol/L      Comment: Slight hemolysis detected by analyzer. Result may be falsely elevated.        Chloride 96 mmol/L      CO2 28.0 mmol/L      Calcium 9.2 mg/dL      Total Protein 7.5 g/dL      Albumin 3.5 g/dL      ALT (SGPT) 17 U/L      AST (SGOT) 24 U/L      Alkaline Phosphatase 113 U/L      Total Bilirubin 0.7 mg/dL      Globulin 4.0 gm/dL      A/G Ratio 0.9 g/dL      BUN/Creatinine Ratio 13.7     Anion Gap 13.0 mmol/L      eGFR 36.4 mL/min/1.73     Narrative:      GFR Normal >60  Chronic Kidney Disease <60  Kidney Failure <15      Phosphorus [[id number]]  (Normal) Collected: 05/13/24 0450     Specimen: Blood from Arm, Left Updated: 05/13/24 0524     Phosphorus 4.0 mg/dL     CBC & Differential [[id number]]  (Abnormal) Collected: 05/13/24 0450    Specimen: Blood from Arm, Left Updated: 05/13/24 0504    Narrative:      The following orders were created for panel order CBC & Differential.  Procedure                               Abnormality         Status                     ---------                               -----------         ------                     CBC Auto Differential[[id number]]        Abnormal            Final result                 Please view results for these tests on the individual orders.    CBC Auto Differential [[id number]]  (Abnormal) Collected: 05/13/24 0450    Specimen: Blood from Arm, Left Updated: 05/13/24 0504     WBC 12.02 10*3/mm3      RBC 4.46 10*6/mm3      Hemoglobin 10.3 g/dL      Hematocrit 35.8 %      MCV 80.3 fL      MCH 23.1 pg      MCHC 28.8 g/dL      RDW 16.8 %      RDW-SD 48.0 fl      MPV 9.2 fL      Platelets 388 10*3/mm3      Neutrophil % 61.3 %      Lymphocyte % 23.0 %      Monocyte % 9.5 %      Eosinophil % 1.6 %      Basophil % 0.6 %      Immature Grans % 4.0 %      Neutrophils, Absolute 7.37 10*3/mm3      Lymphocytes, Absolute 2.77 10*3/mm3      Monocytes, Absolute 1.14 10*3/mm3      Eosinophils, Absolute 0.19 10*3/mm3      Basophils, Absolute 0.07 10*3/mm3      Immature Grans, Absolute 0.48 10*3/mm3      nRBC 0.2 /100 WBC     ToxAssure Flex 14, Urine - Urine, Clean Catch [[id number]] Collected: 05/13/24 0022    Specimen: Urine, Clean Catch Updated: 05/13/24 0027    Blood Culture - Blood, Arm, Right [[id number]]  (Normal) Collected: 05/11/24 2147    Specimen: Blood from Arm, Right Updated: 05/12/24 2201     Blood Culture No growth at 24 hours    Blood Culture - Blood, Arm, Left [[id number]]  (Normal) Collected: 05/11/24 2147    Specimen: Blood from Arm, Left Updated: 05/12/24 2201     Blood Culture No growth at 24 hours    Blood Gas, Arterial - [[id number]]   (Abnormal) Collected: 05/12/24 1725    Specimen: Arterial Blood Updated: 05/12/24 1730     Site Left Radial     Anthony's Test Positive     pH, Arterial 7.295 pH units      pCO2, Arterial 64.2 mm Hg      pO2, Arterial 91.8 mm Hg      HCO3, Arterial 31.2 mmol/L      Base Excess, Arterial 3.0 mmol/L      Comment: Serial Number: [id number]:  [id number]        O2 Saturation, Arterial 95.8 %      Barometric Pressure for Blood Gas --     Comment: N/A        Modality Cannula     FIO2 28 %      Hemodilution No    POC Creatinine [[id number]]  (Normal) Collected: 05/12/24 1725    Specimen: Arterial Blood Updated: 05/12/24 1730     Creatinine 0.96 mg/dL      Comment: Serial Number: [id number]:  [id number]        eGFR 71.3 mL/min/1.73     POCT Electrolytes +HGB +HCT [[id number]]  (Abnormal) Collected: 05/12/24 1725    Specimen: Arterial Blood Updated: 05/12/24 1730     Sodium 136 mmol/L      POC Potassium 4.4 mmol/L      Ionized Calcium 1.24 mmol/L      Comment: Serial Number: [id number]:  [id number]        Glucose 117 mg/dL      Hematocrit 38 %      Hemoglobin 12.8 g/dL     POC Lactate [[id number]]  (Normal) Collected: 05/12/24 1725    Specimen: Arterial Blood Updated: 05/12/24 1730     Lactate 0.3 mmol/L      Comment: Serial Number: [id number]:  [id number]       POC Glucose Once [[id number]]  (Abnormal) Collected: 05/12/24 1725    Specimen: Arterial Blood Updated: 05/12/24 1730     Glucose 117 mg/dL      Comment: Serial Number: [id number]:  [id number]               Imaging Results (Last 24 Hours)       ** No results found for the last 24 hours. **            EKG      I personally viewed and interpreted the patient's EKG/Telemetry data:    ECHOCARDIOGRAM:  Results for orders placed during the hospital encounter of 05/11/24    Adult Transthoracic Echo Complete w/ Color, Spectral and Contrast if Necessary Per Protocol    Interpretation Summary    Left ventricular systolic function is severely decreased. Calculated left ventricular 3D EF =  18% Left ventricular ejection fraction appears to be less than 20%.    Left ventricular diastolic function is consistent with (grade II w/high LAP) pseudonormalization.    Severely reduced right ventricular systolic function noted.    The right ventricular cavity is moderately dilated.    The left atrial cavity is moderate to severely dilated.    The right atrial cavity is moderate to severely  dilated.    Moderate tricuspid valve regurgitation is present.    Estimated right ventricular systolic pressure from tricuspid regurgitation is moderately elevated (45-55 mmHg).    Moderate to severe pulmonary hypertension is present.         STRESS MYOVIEW:       CARDIAC CATHETERIZATION:    No results found for this or any previous visit.       OTHER:         MDM      HFrEF  Patient has congestive heart failure LV systolic dysfunction EF of 20%  Patient is not on any medicines  Patient will be started on low-dose beta-blockers but cannot have ACE inhibitor's right now because of renal insufficiency  Patient also on IV Lasix but will monitor her renal function    Non-STEMI  Patient had elevated troponin but could be type II MI but will need a cardiac evaluation because of her new diagnosis of heart failure  Patient is on aspirin and will check her lipid levels and also placed on statins.    History of DVT and pulmonary embolism  Patient was admitted in [facility] for the same and was on Eliquis but did not take it regularly because of lack of insurance.  She will take her Eliquis now because she has insurance.  Patient had an echocardiogram which showed severe right ventricular systolic dysfunction also.    Acute respiratory failure  Patient has shortness of breath or viral pneumonia with rhinovirus but also had recent PE with right ventricular strain and dysfunction  Patient is on oxygen and her proBNP is also elevated and is on Lasix  Patient also on antibiotics for pneumonia.    Hypertension  Patient's blood  pressure is currently stable on beta-blockers and losartan    I discussed the patients findings and my recommendations with patient and nurse    Rob Garrido MD  05/13/24  10:50 EDT

## 2024-05-14 LAB
ALBUMIN SERPL-MCNC: 3.6 G/DL (ref 3.5–5.2)
ALBUMIN/GLOB SERPL: 1 G/DL
ALP SERPL-CCNC: 104 U/L (ref 39–117)
ALT SERPL W P-5'-P-CCNC: 16 U/L (ref 1–33)
ANION GAP SERPL CALCULATED.3IONS-SCNC: 11 MMOL/L (ref 5–15)
AST SERPL-CCNC: 18 U/L (ref 1–32)
BASOPHILS # BLD AUTO: 0.04 10*3/MM3 (ref 0–0.2)
BASOPHILS NFR BLD AUTO: 0.3 % (ref 0–1.5)
BILIRUB SERPL-MCNC: 0.6 MG/DL (ref 0–1.2)
BUN SERPL-MCNC: 28 MG/DL (ref 6–20)
BUN/CREAT SERPL: 19.6 (ref 7–25)
CA-I SERPL ISE-MCNC: 1.26 MMOL/L (ref 1.2–1.3)
CALCIUM SPEC-SCNC: 9.1 MG/DL (ref 8.6–10.5)
CHLORIDE SERPL-SCNC: 91 MMOL/L (ref 98–107)
CO2 SERPL-SCNC: 29 MMOL/L (ref 22–29)
CREAT SERPL-MCNC: 1.43 MG/DL (ref 0.57–1)
DEPRECATED RDW RBC AUTO: 47.8 FL (ref 37–54)
EGFRCR SERPLBLD CKD-EPI 2021: 44.2 ML/MIN/1.73
EOSINOPHIL # BLD AUTO: 0 10*3/MM3 (ref 0–0.4)
EOSINOPHIL NFR BLD AUTO: 0 % (ref 0.3–6.2)
ERYTHROCYTE [DISTWIDTH] IN BLOOD BY AUTOMATED COUNT: 16.5 % (ref 12.3–15.4)
GLOBULIN UR ELPH-MCNC: 3.7 GM/DL
GLUCOSE SERPL-MCNC: 206 MG/DL (ref 65–99)
HCT VFR BLD AUTO: 37 % (ref 34–46.6)
HGB BLD-MCNC: 10.5 G/DL (ref 12–15.9)
IMM GRANULOCYTES # BLD AUTO: 0.51 10*3/MM3 (ref 0–0.05)
IMM GRANULOCYTES NFR BLD AUTO: 4.3 % (ref 0–0.5)
LYMPHOCYTES # BLD AUTO: 0.97 10*3/MM3 (ref 0.7–3.1)
LYMPHOCYTES NFR BLD AUTO: 8.2 % (ref 19.6–45.3)
MAGNESIUM SERPL-MCNC: 2 MG/DL (ref 1.6–2.6)
MCH RBC QN AUTO: 22.8 PG (ref 26.6–33)
MCHC RBC AUTO-ENTMCNC: 28.4 G/DL (ref 31.5–35.7)
MCV RBC AUTO: 80.4 FL (ref 79–97)
MONOCYTES # BLD AUTO: 0.22 10*3/MM3 (ref 0.1–0.9)
MONOCYTES NFR BLD AUTO: 1.9 % (ref 5–12)
NEUTROPHILS NFR BLD AUTO: 10.13 10*3/MM3 (ref 1.7–7)
NEUTROPHILS NFR BLD AUTO: 85.3 % (ref 42.7–76)
NRBC BLD AUTO-RTO: 0.3 /100 WBC (ref 0–0.2)
PHOSPHATE SERPL-MCNC: 3.5 MG/DL (ref 2.5–4.5)
PLATELET # BLD AUTO: 357 10*3/MM3 (ref 140–450)
PMV BLD AUTO: 9.2 FL (ref 6–12)
POTASSIUM SERPL-SCNC: 5.1 MMOL/L (ref 3.5–5.2)
PROT SERPL-MCNC: 7.3 G/DL (ref 6–8.5)
RBC # BLD AUTO: 4.6 10*6/MM3 (ref 3.77–5.28)
SODIUM SERPL-SCNC: 131 MMOL/L (ref 136–145)
URATE SERPL-MCNC: 9.6 MG/DL (ref 2.4–5.7)
WBC NRBC COR # BLD AUTO: 11.87 10*3/MM3 (ref 3.4–10.8)

## 2024-05-14 PROCEDURE — 99233 SBSQ HOSP IP/OBS HIGH 50: CPT | Performed by: INTERNAL MEDICINE

## 2024-05-14 PROCEDURE — 94799 UNLISTED PULMONARY SVC/PX: CPT

## 2024-05-14 PROCEDURE — 83735 ASSAY OF MAGNESIUM: CPT | Performed by: HOSPITALIST

## 2024-05-14 PROCEDURE — 82330 ASSAY OF CALCIUM: CPT | Performed by: INTERNAL MEDICINE

## 2024-05-14 PROCEDURE — 85025 COMPLETE CBC W/AUTO DIFF WBC: CPT | Performed by: HOSPITALIST

## 2024-05-14 PROCEDURE — 25010000002 FUROSEMIDE PER 20 MG: Performed by: INTERNAL MEDICINE

## 2024-05-14 PROCEDURE — 80053 COMPREHEN METABOLIC PANEL: CPT | Performed by: HOSPITALIST

## 2024-05-14 PROCEDURE — 25010000002 METHYLPREDNISOLONE PER 40 MG: Performed by: HOSPITALIST

## 2024-05-14 PROCEDURE — 94664 DEMO&/EVAL PT USE INHALER: CPT

## 2024-05-14 PROCEDURE — 25010000002 CEFTRIAXONE PER 250 MG: Performed by: HOSPITALIST

## 2024-05-14 PROCEDURE — 84100 ASSAY OF PHOSPHORUS: CPT | Performed by: INTERNAL MEDICINE

## 2024-05-14 PROCEDURE — 84550 ASSAY OF BLOOD/URIC ACID: CPT | Performed by: INTERNAL MEDICINE

## 2024-05-14 PROCEDURE — 94761 N-INVAS EAR/PLS OXIMETRY MLT: CPT

## 2024-05-14 RX ORDER — HYDRALAZINE HYDROCHLORIDE 25 MG/1
25 TABLET, FILM COATED ORAL EVERY 12 HOURS SCHEDULED
Status: DISCONTINUED | OUTPATIENT
Start: 2024-05-14 | End: 2024-05-20 | Stop reason: HOSPADM

## 2024-05-14 RX ORDER — FUROSEMIDE 10 MG/ML
40 INJECTION INTRAMUSCULAR; INTRAVENOUS EVERY 12 HOURS
Status: DISCONTINUED | OUTPATIENT
Start: 2024-05-14 | End: 2024-05-17

## 2024-05-14 RX ORDER — NICOTINE 21 MG/24HR
1 PATCH, TRANSDERMAL 24 HOURS TRANSDERMAL
Status: DISCONTINUED | OUTPATIENT
Start: 2024-05-14 | End: 2024-05-20 | Stop reason: HOSPADM

## 2024-05-14 RX ORDER — HYDROXYZINE HYDROCHLORIDE 25 MG/1
25 TABLET, FILM COATED ORAL 3 TIMES DAILY PRN
Status: DISCONTINUED | OUTPATIENT
Start: 2024-05-14 | End: 2024-05-20 | Stop reason: HOSPADM

## 2024-05-14 RX ADMIN — Medication 1 PATCH: at 00:42

## 2024-05-14 RX ADMIN — HYDRALAZINE HYDROCHLORIDE 25 MG: 25 TABLET ORAL at 21:14

## 2024-05-14 RX ADMIN — CEFTRIAXONE 2000 MG: 2 INJECTION, POWDER, FOR SOLUTION INTRAMUSCULAR; INTRAVENOUS at 12:50

## 2024-05-14 RX ADMIN — OXYCODONE 10 MG: 5 TABLET ORAL at 07:55

## 2024-05-14 RX ADMIN — AZITHROMYCIN DIHYDRATE 500 MG: 250 TABLET ORAL at 12:50

## 2024-05-14 RX ADMIN — HYDROXYZINE HYDROCHLORIDE 25 MG: 25 TABLET, FILM COATED ORAL at 00:42

## 2024-05-14 RX ADMIN — BENZONATATE 200 MG: 100 CAPSULE ORAL at 21:15

## 2024-05-14 RX ADMIN — HYDROXYZINE HYDROCHLORIDE 25 MG: 25 TABLET, FILM COATED ORAL at 21:15

## 2024-05-14 RX ADMIN — Medication 10 ML: at 21:15

## 2024-05-14 RX ADMIN — APIXABAN 5 MG: 5 TABLET, FILM COATED ORAL at 08:01

## 2024-05-14 RX ADMIN — IPRATROPIUM BROMIDE AND ALBUTEROL SULFATE 3 ML: .5; 3 SOLUTION RESPIRATORY (INHALATION) at 11:00

## 2024-05-14 RX ADMIN — IPRATROPIUM BROMIDE AND ALBUTEROL SULFATE 3 ML: .5; 3 SOLUTION RESPIRATORY (INHALATION) at 06:57

## 2024-05-14 RX ADMIN — FUROSEMIDE 40 MG: 10 INJECTION, SOLUTION INTRAMUSCULAR; INTRAVENOUS at 08:01

## 2024-05-14 RX ADMIN — OXYCODONE 10 MG: 5 TABLET ORAL at 12:57

## 2024-05-14 RX ADMIN — FUROSEMIDE 40 MG: 10 INJECTION, SOLUTION INTRAMUSCULAR; INTRAVENOUS at 19:24

## 2024-05-14 RX ADMIN — METOPROLOL SUCCINATE 25 MG: 25 TABLET, FILM COATED, EXTENDED RELEASE ORAL at 21:14

## 2024-05-14 RX ADMIN — OXYCODONE 10 MG: 5 TABLET ORAL at 19:24

## 2024-05-14 RX ADMIN — IPRATROPIUM BROMIDE AND ALBUTEROL SULFATE 3 ML: .5; 3 SOLUTION RESPIRATORY (INHALATION) at 16:20

## 2024-05-14 RX ADMIN — METHYLPREDNISOLONE SODIUM SUCCINATE 40 MG: 40 INJECTION, POWDER, FOR SOLUTION INTRAMUSCULAR; INTRAVENOUS at 12:50

## 2024-05-14 RX ADMIN — ASPIRIN 81 MG: 81 TABLET, COATED ORAL at 08:01

## 2024-05-14 RX ADMIN — APIXABAN 5 MG: 5 TABLET, FILM COATED ORAL at 21:14

## 2024-05-14 NOTE — DISCHARGE INSTR - APPOINTMENTS
Please follow up with Primary Care appointment. Please arrive at 9:15 am. Thursday, May 16th, 2024.    [facility] [address] [address]  Phone: [phone]

## 2024-05-14 NOTE — PROGRESS NOTES
CARDIOLOGY PROGRESS NOTE:    Jen Cruz  53 y.o.  female  1970  [id number]      Referring Provider: Hospitalist    Reason for follow-up: Shortness of breath and HFrEF     Patient Care Team:  Provider, No Known as PCP - Albert Russo MD as Consulting Physician (Nephrology)    Subjective .  Patient still continues to have shortness of breath    Objective lying in bed with mild respiratory distress     Review of Systems   Constitutional: Negative for fever and malaise/fatigue.   HENT:  Negative for ear pain and nosebleeds.    Eyes:  Negative for blurred vision and double vision.   Cardiovascular:  Negative for chest pain, dyspnea on exertion and palpitations.   Respiratory:  Positive for shortness of breath. Negative for cough.    Skin:  Negative for rash.   Musculoskeletal:  Negative for joint pain.   Gastrointestinal:  Negative for abdominal pain, nausea and vomiting.   Neurological:  Negative for focal weakness and headaches.   Psychiatric/Behavioral:  Negative for depression. The patient is not nervous/anxious.    All other systems reviewed and are negative.      Allergies: Heparin and Wasp venom    Scheduled Meds:apixaban, 5 mg, Oral, Q12H  aspirin, 81 mg, Oral, Daily  azithromycin, 500 mg, Oral, Q24H  cefTRIAXone, 2,000 mg, Intravenous, Q24H  furosemide, 40 mg, Intravenous, Daily  ipratropium-albuterol, 3 mL, Nebulization, 4x Daily - RT  methylPREDNISolone sodium succinate, 40 mg, Intravenous, Q12H  [Held by provider] metoprolol succinate XL, 25 mg, Oral, Q12H  nicotine, 1 patch, Transdermal, Q24H  sodium chloride, 10 mL, Intravenous, Q12H      Continuous Infusions:   PRN Meds:.  benzonatate    senna-docusate sodium **AND** polyethylene glycol **AND** bisacodyl **AND** bisacodyl    Calcium Replacement - Follow Nurse / BPA Driven Protocol    hydrOXYzine    ipratropium-albuterol    Magnesium Low Dose Replacement - Follow Nurse / BPA Driven Protocol    naloxone    oxyCODONE    oxyCODONE     "Phosphorus Replacement - Follow Nurse / BPA Driven Protocol    Potassium Replacement - Follow Nurse / BPA Driven Protocol    [COMPLETED] Insert Peripheral IV **AND** sodium chloride    sodium chloride    sodium chloride        VITAL SIGNS  Vitals:    05/14/24 0920 05/14/24 1100 05/14/24 1103 05/14/24 1238   BP: 124/70   115/65   BP Location: Left arm   Left arm   Patient Position: Lying   Lying   Pulse: 80 89 86 81   Resp: 18 16 18 18   Temp: 98 °F (36.7 °C)   98 °F (36.7 °C)   TempSrc: Oral   Oral   SpO2: 98% 98% 100% 100%   Weight:       Height:           Flowsheet Rows      Flowsheet Row First Filed Value   Admission Height 165.1 cm (65\") Documented at 05/11/2024 2019   Admission Weight 95.3 kg (210 lb) Documented at 05/11/2024 2019             TELEMETRY: Sinus rhythm    Physical Exam:  Constitutional:       Appearance: Well-developed.   Eyes:      General: No scleral icterus.     Conjunctiva/sclera: Conjunctivae normal.      Pupils: Pupils are equal, round, and reactive to light.   HENT:      Head: Normocephalic and atraumatic.   Neck:      Vascular: No carotid bruit or JVD.   Pulmonary:      Effort: Pulmonary effort is normal.      Breath sounds: Normal breath sounds. No wheezing. No rales.   Cardiovascular:      Normal rate. Regular rhythm.   Pulses:     Intact distal pulses.   Abdominal:      General: Bowel sounds are normal.      Palpations: Abdomen is soft.   Musculoskeletal: Normal range of motion.      Cervical back: Normal range of motion and neck supple. Skin:     General: Skin is warm and dry.      Findings: No rash.   Neurological:      Mental Status: Alert.      Comments: No focal deficits          Results Review:   I reviewed the patient's new clinical results.  Lab Results (last 24 hours)       Procedure Component Value Units Date/Time    Magnesium [[id number]]  (Normal) Collected: 05/14/24 0234    Specimen: Blood from Hand, Right Updated: 05/14/24 0306     Magnesium 2.0 mg/dL     Comprehensive " Metabolic Panel [[id number]]  (Abnormal) Collected: 05/14/24 0234    Specimen: Blood from Hand, Right Updated: 05/14/24 0306     Glucose 206 mg/dL      BUN 28 mg/dL      Creatinine 1.43 mg/dL      Sodium 131 mmol/L      Potassium 5.1 mmol/L      Chloride 91 mmol/L      CO2 29.0 mmol/L      Calcium 9.1 mg/dL      Total Protein 7.3 g/dL      Albumin 3.6 g/dL      ALT (SGPT) 16 U/L      AST (SGOT) 18 U/L      Alkaline Phosphatase 104 U/L      Total Bilirubin 0.6 mg/dL      Globulin 3.7 gm/dL      A/G Ratio 1.0 g/dL      BUN/Creatinine Ratio 19.6     Anion Gap 11.0 mmol/L      eGFR 44.2 mL/min/1.73     Narrative:      GFR Normal >60  Chronic Kidney Disease <60  Kidney Failure <15      Uric Acid [[id number]]  (Abnormal) Collected: 05/14/24 0234    Specimen: Blood from Hand, Right Updated: 05/14/24 0306     Uric Acid 9.6 mg/dL     Phosphorus [[id number]]  (Normal) Collected: 05/14/24 0234    Specimen: Blood from Hand, Right Updated: 05/14/24 0306     Phosphorus 3.5 mg/dL     CBC & Differential [[id number]]  (Abnormal) Collected: 05/14/24 0234    Specimen: Blood from Hand, Right Updated: 05/14/24 0253    Narrative:      The following orders were created for panel order CBC & Differential.  Procedure                               Abnormality         Status                     ---------                               -----------         ------                     CBC Auto Differential[[id number]]        Abnormal            Final result                 Please view results for these tests on the individual orders.    CBC Auto Differential [[id number]]  (Abnormal) Collected: 05/14/24 0234    Specimen: Blood from Hand, Right Updated: 05/14/24 0253     WBC 11.87 10*3/mm3      RBC 4.60 10*6/mm3      Hemoglobin 10.5 g/dL      Hematocrit 37.0 %      MCV 80.4 fL      MCH 22.8 pg      MCHC 28.4 g/dL      RDW 16.5 %      RDW-SD 47.8 fl      MPV 9.2 fL      Platelets 357 10*3/mm3      Neutrophil % 85.3 %      Lymphocyte % 8.2 %      Monocyte %  1.9 %      Eosinophil % 0.0 %      Basophil % 0.3 %      Immature Grans % 4.3 %      Neutrophils, Absolute 10.13 10*3/mm3      Lymphocytes, Absolute 0.97 10*3/mm3      Monocytes, Absolute 0.22 10*3/mm3      Eosinophils, Absolute 0.00 10*3/mm3      Basophils, Absolute 0.04 10*3/mm3      Immature Grans, Absolute 0.51 10*3/mm3      nRBC 0.3 /100 WBC     Calcium, Ionized [[id number]]  (Normal) Collected: 05/14/24 0234    Specimen: Blood from Hand, Right Updated: 05/14/24 0250     Ionized Calcium 1.26 mmol/L     Blood Culture - Blood, Arm, Right [[id number]]  (Normal) Collected: 05/11/24 2147    Specimen: Blood from Arm, Right Updated: 05/13/24 2200     Blood Culture No growth at 2 days    Blood Culture - Blood, Arm, Left [[id number]]  (Normal) Collected: 05/11/24 2147    Specimen: Blood from Arm, Left Updated: 05/13/24 2200     Blood Culture No growth at 2 days    Urinalysis With Culture If Indicated - Urine, Clean Catch [[id number]]  (Normal) Collected: 05/13/24 1433    Specimen: Urine, Clean Catch Updated: 05/13/24 1442     Color, UA Yellow     Appearance, UA Clear     pH, UA <=5.0     Specific Gravity, UA 1.012     Glucose, UA Negative     Ketones, UA Negative     Bilirubin, UA Negative     Blood, UA Negative     Protein, UA Negative     Leuk Esterase, UA Negative     Nitrite, UA Negative     Urobilinogen, UA 1.0 E.U./dL    Narrative:      In absence of clinical symptoms, the presence of pyuria, bacteria, and/or nitrites on the urinalysis result does not correlate with infection.  Urine microscopic not indicated.            Imaging Results (Last 24 Hours)       Procedure Component Value Units Date/Time    US Renal Bilateral [[id number]] Collected: 05/13/24 1539     Updated: 05/13/24 1542    Narrative:      US RENAL BILATERAL    Date of Exam: 5/13/2024 3:19 PM EDT    Indication: Kidney failure, acute  NICCI.    Comparison: No comparisons available.    Technique: Grayscale and color Doppler ultrasound evaluation of the  kidneys and urinary bladder was performed.      Findings:  RIGHT kidney measures 9.6 x 4.8 x 4.7 cm.  Kidney echogenicity, size, and vascularity appear within normal limits. There is no solid kidney mass.  No echogenic shadowing stone.  No hydronephrosis.    LEFT kidney measures 9.1 x 3.7 x 5.1 cm. Kidney echogenicity, size, and vascularity appear within normal limits. There is no solid kidney mass.  No echogenic shadowing stone.  No hydronephrosis.    Limited visualization of the urinary bladder is unremarkable.        Impression:      Impression:  Unremarkable renal ultrasound.        Electronically Signed: Adriana Castanon MD    5/13/2024 3:40 PM EDT    Workstation ID: [id number]            EKG      I personally viewed and interpreted the patient's EKG/Telemetry data:    ECHOCARDIOGRAM:  Results for orders placed during the hospital encounter of 05/11/24    Adult Transthoracic Echo Complete w/ Color, Spectral and Contrast if Necessary Per Protocol    Interpretation Summary    Left ventricular systolic function is severely decreased. Calculated left ventricular 3D EF = 18% Left ventricular ejection fraction appears to be less than 20%.    Left ventricular diastolic function is consistent with (grade II w/high LAP) pseudonormalization.    Severely reduced right ventricular systolic function noted.    The right ventricular cavity is moderately dilated.    The left atrial cavity is moderate to severely dilated.    The right atrial cavity is moderate to severely  dilated.    Moderate tricuspid valve regurgitation is present.    Estimated right ventricular systolic pressure from tricuspid regurgitation is moderately elevated (45-55 mmHg).    Moderate to severe pulmonary hypertension is present.       STRESS MYOVIEW:       CARDIAC CATHETERIZATION:  No results found for this or any previous visit.       OTHER:         Assessment & Plan     HFrEF  Patient has congestive heart failure LV systolic dysfunction EF of 20%  Patient  is not on any medicines  Patient will be started on low-dose beta-blockers but cannot have ACE inhibitor's right now because of renal insufficiency  Patient also on IV Lasix but will monitor her renal function  Patient will have cardiac catheterization to rule out coronary artery disease  Patient will be placed on low-dose hydralazine since her blood pressure is stable.     Non-STEMI  Patient had elevated troponin but could be type II MI but will need a cardiac evaluation because of her new diagnosis of heart failure  Patient is on aspirin and will check her lipid levels and also placed on statins.  Patient will have cardiac authorization performed.  Discussed with patient about procedure risks and benefits.     History of DVT and pulmonary embolism  Patient was admitted in [facility] for the same and was on Eliquis but did not take it regularly because of lack of insurance.  She will take her Eliquis now because she has insurance.  Patient had an echocardiogram which showed severe right ventricular systolic dysfunction also.     Acute respiratory failure  Patient has shortness of breath or viral pneumonia with rhinovirus but also had recent PE with right ventricular strain and dysfunction  Patient is on oxygen and her proBNP is also elevated and is on Lasix  Patient also on antibiotics for pneumonia.     Hypertension  Patient's blood pressure is currently stable on beta-blockers and losartan  Her losartan has been stopped because of renal insufficiency and will place on hydralazine.    I discussed the patients findings and my recommendations with patient and nurse    Rob Garrido MD  05/14/24  14:02 EDT

## 2024-05-14 NOTE — PLAN OF CARE
Goal Outcome Evaluation:  Plan of Care Reviewed With: patient        Progress: improving  Outcome Evaluation: Patient alert and oriented, up in room, non compliant with heart ,O2 monitorand O2, patgient states she hasnt wore them all day, patient upset, states she wants to take a shower, patient states she's leaving, this nurse encouraged and educated patient why we needed them on, patient had panic attack, NP notified, atarax given, patient stated she will stay, patient resting, patient continues on IV ABX, IV steriods, and pain medication for back, O2@4 liters.VSS.

## 2024-05-14 NOTE — CONSULTS
"Heart Failure Program  Nurse Navigator  Discharge Planning    Patient Name:Jen Cruz  :1970  Cardiologist:Radhika  Current Admission Date: 2024   Previous Admission:    Admission frequency: 1 admissions in 6 months    Heart Failure history per record:    Symptoms on admission:c/o swelling, soa past 1 month, pt advises she just moved back here from [place] area. Pt advises was off medications while [place] out of state.       Admission Weight:  Flowsheet Rows      Flowsheet Row First Filed Value   Admission Height 165.1 cm (65\") Documented at 2024   Admission Weight 95.3 kg (210 lb) Documented at 2024              Current Home Medications:  Prior to Admission medications    Medication Sig Start Date End Date Taking? Authorizing Provider   apixaban (ELIQUIS) 5 MG tablet tablet Take 1 tablet by mouth 2 (Two) Times a Day.   Yes Jennifer Barnett MD   furosemide (LASIX) 40 MG tablet Take 1 tablet by mouth Daily.   Yes ProviderJennifer MD   losartan (COZAAR) 25 MG tablet Take 1 tablet by mouth Daily.   Yes Jennifer Barnett MD   metoprolol succinate XL (TOPROL-XL) 25 MG 24 hr tablet Take 1 tablet by mouth Daily.   Yes Jennifer Barnett MD   spironolactone (ALDACTONE) 25 MG tablet Take 1 tablet by mouth Daily.   Yes ProviderJennifer MD       Social history:   Pt from home, pt daughter in area.     Smoking status:     Diagnostics Testing:  proBNP level: 12159    Echocardiogram:Results for orders placed during the hospital encounter of 24    Adult Transthoracic Echo Complete w/ Color, Spectral and Contrast if Necessary Per Protocol    Interpretation Summary    Left ventricular systolic function is severely decreased. Calculated left ventricular 3D EF = 18% Left ventricular ejection fraction appears to be less than 20%.    Left ventricular diastolic function is consistent with (grade II w/high LAP) pseudonormalization.    Severely reduced right ventricular " systolic function noted.    The right ventricular cavity is moderately dilated.    The left atrial cavity is moderate to severely dilated.    The right atrial cavity is moderate to severely  dilated.    Moderate tricuspid valve regurgitation is present.    Estimated right ventricular systolic pressure from tricuspid regurgitation is moderately elevated (45-55 mmHg).    Moderate to severe pulmonary hypertension is present.        Patient Assessment:   Pt walking in room on arrival, no soa with ambulation, noted 3+ edema to lower legs extending into thighs.     Current O2:    Home O2:      Education provided to patient:  yes- Heart Failure disease education  yes -Symptom identification/management  yes -Daily Weights  yes- Diet education  yes- Fluid restriction (if ordered)  yes- Activity education  yes- Medication education  na- Smoking cessation  yes- Follow-up Appointments  yes-Provided information on how to access [facility] My HF Guide/Heart Failure Interactive workbook    Acceptance of learning: acceptance, cooperative    Heart Failure education interactive teaching session time: 30 minutes    GWTG: EF 18%    Identified needs/barriers:   Volume status, GDMT - toprol xl. Needs 7 day appointment and cardiology appointment.     Intervention:   Education, HF clinic apt made per pt request.

## 2024-05-14 NOTE — PROGRESS NOTES
"NEPHROLOGY PROGRESS NOTE------[facility]    [facility]  [phone]  Albert Manzo MD      Patient Care Team:  Provider, No Known as PCP - Albert Russo MD as Consulting Physician (Nephrology)      Provider:  Albert Manzo MD  Patient Name: Jen Cruz  :  1970    SUBJECTIVE:  F/U NICCI/CKD  No chest pain, still some SOA    Medication:  apixaban, 5 mg, Oral, Q12H  aspirin, 81 mg, Oral, Daily  azithromycin, 500 mg, Oral, Q24H  cefTRIAXone, 2,000 mg, Intravenous, Q24H  furosemide, 40 mg, Intravenous, Daily  ipratropium-albuterol, 3 mL, Nebulization, 4x Daily - RT  methylPREDNISolone sodium succinate, 40 mg, Intravenous, Q12H  [Held by provider] metoprolol succinate XL, 25 mg, Oral, Q12H  nicotine, 1 patch, Transdermal, Q24H  sodium chloride, 10 mL, Intravenous, Q12H           OBJECTIVE    Vital Sign Min/Max for last 24 hours  Temp  Min: 97.7 °F (36.5 °C)  Max: 98.2 °F (36.8 °C)   BP  Min: 122/86  Max: 140/95   Pulse  Min: 76  Max: 102   Resp  Min: 16  Max: 20   SpO2  Min: 80 %  Max: 100 %   No data recorded   Weight  Min: 91.6 kg (201 lb 15.1 oz)  Max: 91.6 kg (201 lb 15.1 oz)     Flowsheet Rows      Flowsheet Row First Filed Value   Admission Height 165.1 cm (65\") Documented at 2024   Admission Weight 95.3 kg (210 lb) Documented at 2024            No intake/output data recorded.  I/O last 3 completed shifts:  In: 1200 [P.O.:1200]  Out: 1400 [Urine:1400]    Physical Exam:  General Appearance: alert, appears stated age and cooperative  Head: normocephalic, without obvious abnormality and atraumatic  Eyes: conjunctivae and sclerae normal and no icterus  Neck: supple and no JVD  Lungs: scattered rhonchi  Heart: regular rhythm & normal rate and normal S1, S2  Chest: Wall no abnormalities observed  Abdomen: normal bowel sounds and soft, nontender  Extremities: moves extremities well, + edema, no cyanosis and no " "redness  Skin: no bleeding, bruising or rash, turgor normal, color normal and no lesions noted  Neurologic: Alert, and oriented. No focal deficits    Labs:    WBC WBC   Date Value Ref Range Status   05/14/2024 11.87 (H) 3.40 - 10.80 10*3/mm3 Final   05/13/2024 12.02 (H) 3.40 - 10.80 10*3/mm3 Final   05/11/2024 11.31 (H) 3.40 - 10.80 10*3/mm3 Final      HGB Hemoglobin   Date Value Ref Range Status   05/14/2024 10.5 (L) 12.0 - 15.9 g/dL Final   05/13/2024 10.3 (L) 12.0 - 15.9 g/dL Final   05/12/2024 12.8 12.0 - 17.0 g/dL Final   05/11/2024 11.4 (L) 12.0 - 15.9 g/dL Final      HCT Hematocrit   Date Value Ref Range Status   05/14/2024 37.0 34.0 - 46.6 % Final   05/13/2024 35.8 34.0 - 46.6 % Final   05/12/2024 38 38 - 51 % Final   05/11/2024 39.8 34.0 - 46.6 % Final      Platelets No results found for: \"LABPLAT\"   MCV MCV   Date Value Ref Range Status   05/14/2024 80.4 79.0 - 97.0 fL Final   05/13/2024 80.3 79.0 - 97.0 fL Final   05/11/2024 79.8 79.0 - 97.0 fL Final          Sodium Sodium   Date Value Ref Range Status   05/14/2024 131 (L) 136 - 145 mmol/L Final   05/13/2024 137 136 - 145 mmol/L Final   05/11/2024 136 136 - 145 mmol/L Final      Potassium Potassium   Date Value Ref Range Status   05/14/2024 5.1 3.5 - 5.2 mmol/L Final   05/13/2024 4.9 3.5 - 5.2 mmol/L Final     Comment:     Slight hemolysis detected by analyzer. Result may be falsely elevated.   05/11/2024 4.2 3.5 - 5.2 mmol/L Final      Chloride Chloride   Date Value Ref Range Status   05/14/2024 91 (L) 98 - 107 mmol/L Final   05/13/2024 96 (L) 98 - 107 mmol/L Final   05/11/2024 97 (L) 98 - 107 mmol/L Final      CO2 CO2   Date Value Ref Range Status   05/14/2024 29.0 22.0 - 29.0 mmol/L Final   05/13/2024 28.0 22.0 - 29.0 mmol/L Final   05/11/2024 25.0 22.0 - 29.0 mmol/L Final      BUN BUN   Date Value Ref Range Status   05/14/2024 28 (H) 6 - 20 mg/dL Final   05/13/2024 23 (H) 6 - 20 mg/dL Final   05/11/2024 11 6 - 20 mg/dL Final      Creatinine Creatinine " "  Date Value Ref Range Status   05/14/2024 1.43 (H) 0.57 - 1.00 mg/dL Final   05/13/2024 1.68 (H) 0.57 - 1.00 mg/dL Final   05/12/2024 0.96 0.60 - 1.30 mg/dL Final     Comment:     Serial Number: [id number]:  [id number]   05/11/2024 0.89 0.57 - 1.00 mg/dL Final      Calcium Calcium   Date Value Ref Range Status   05/14/2024 9.1 8.6 - 10.5 mg/dL Final   05/13/2024 9.2 8.6 - 10.5 mg/dL Final   05/11/2024 9.8 8.6 - 10.5 mg/dL Final      PO4 No components found for: \"PO4\"   Albumin Albumin   Date Value Ref Range Status   05/14/2024 3.6 3.5 - 5.2 g/dL Final   05/13/2024 3.5 3.5 - 5.2 g/dL Final   05/11/2024 3.7 3.5 - 5.2 g/dL Final      Magnesium Magnesium   Date Value Ref Range Status   05/14/2024 2.0 1.6 - 2.6 mg/dL Final   05/13/2024 2.4 1.6 - 2.6 mg/dL Final      Uric Acid No components found for: \"URIC ACID\"     Imaging Results (Last 72 Hours)       Procedure Component Value Units Date/Time    US Renal Bilateral [[id number]] Collected: 05/13/24 1539     Updated: 05/13/24 1542    Narrative:      US RENAL BILATERAL    Date of Exam: 5/13/2024 3:19 PM EDT    Indication: Kidney failure, acute  NICCI.    Comparison: No comparisons available.    Technique: Grayscale and color Doppler ultrasound evaluation of the kidneys and urinary bladder was performed.      Findings:  RIGHT kidney measures 9.6 x 4.8 x 4.7 cm.  Kidney echogenicity, size, and vascularity appear within normal limits. There is no solid kidney mass.  No echogenic shadowing stone.  No hydronephrosis.    LEFT kidney measures 9.1 x 3.7 x 5.1 cm. Kidney echogenicity, size, and vascularity appear within normal limits. There is no solid kidney mass.  No echogenic shadowing stone.  No hydronephrosis.    Limited visualization of the urinary bladder is unremarkable.        Impression:      Impression:  Unremarkable renal ultrasound.        Electronically Signed: Adriana Castanon MD    5/13/2024 3:40 PM EDT    Workstation ID: [id number]    CT Angiogram Chest Pulmonary Embolism " [[id number]] Collected: 05/11/24 2207     Updated: 05/11/24 2212    Narrative:      CT ANGIOGRAM CHEST PULMONARY EMBOLISM    Date of Exam: 5/11/2024 10:01 PM EDT    Indication: History of PE - dxs 1 month ago. On Eliquis. Worsening dyspnea and chest pain.    Comparison: 5/11/2024.    Technique: Axial CT images were obtained of the chest after the uneventful intravenous administration of iodinated contrast utilizing pulmonary embolism protocol.  Sagittal and coronal reconstructions were performed.  Automated exposure control and   iterative reconstruction methods were used.      Findings:    Pulmonary arteries: Adequate opacification of the pulmonary arteries. No evidence of acute pulmonary embolism.    Lungs and Pleura: There is diffuse anasarca of the soft tissues. Small to moderate size left-sided pleural effusion with small right-sided pleural effusion present. Intralobular septal thickening present. Fluid is seen tracking along the major fissures   bilaterally. Patchy consolidative changes present within the subpleural left upper lobe (series 8 image 64) which appears somewhat masslike measuring approximately 3.8 x 4.5 cm (series 8 image 64). Air bronchograms are present. Mild patchy airspace   disease is present within the bilateral lower lobes.    Mediastinum/Emperatriz: No mediastinal or hilar lymphadenopathy.    Lymph nodes: No axillary or supraclavicular adenopathy.    Cardiovascular: The heart appears enlarged.. The pericardium is normal. The aorta and its arch branch vessels are unremarkable.       Upper Abdomen: The upper abdominal contents are unremarkable.          Bones and Soft Tissue: No suspicious osseous lesion.        Impression:      Impression:    1. No evidence of pulmonary embolism.  2. Bibasilar pneumonia with focal consolidative changes present within the inferior left upper lobe. Follow-up to resolution recommended given the masslike appearance.  3. Mild to moderate underlying pulmonary edema  pattern with small right-sided pleural effusion and small to moderate size left-sided pleural effusion. There is cardiomegaly. Anasarca of the soft tissues present.  4. Ancillary findings as described above.        Electronically Signed: Sarah Good MD    5/11/2024 10:10 PM EDT    Workstation ID: [id number]    XR Chest 1 View [[id number]] Collected: 05/11/24 2103     Updated: 05/11/24 2105    Narrative:      XR CHEST 1 VW    Date of Exam: 5/11/2024 9:01 PM EDT    Indication: chest pain    Comparison: None available.    Findings:  The heart appears enlarged. Patchy airspace disease is seen within the bilateral lower lobes in the perihilar region. Probable small bilateral pleural effusions are present. Chronic appearing interstitial changes are present with indistinctness of the   pulmonary vasculature.. No acute osseous abnormality identified.      Impression:      Impression:  Bibasilar pneumonia with probable mild underlying pulmonary edema pattern and small bilateral pleural effusions. There is mild cardiomegaly..      Electronically Signed: Sarah Good MD    5/11/2024 9:03 PM EDT    Workstation ID: [id number]            Results for orders placed during the hospital encounter of 05/11/24    XR Chest 1 View    Narrative  XR CHEST 1 VW    Date of Exam: 5/11/2024 9:01 PM EDT    Indication: chest pain    Comparison: None available.    Findings:  The heart appears enlarged. Patchy airspace disease is seen within the bilateral lower lobes in the perihilar region. Probable small bilateral pleural effusions are present. Chronic appearing interstitial changes are present with indistinctness of the  pulmonary vasculature.. No acute osseous abnormality identified.    Impression  Impression:  Bibasilar pneumonia with probable mild underlying pulmonary edema pattern and small bilateral pleural effusions. There is mild cardiomegaly..      Electronically Signed: Sarah Good MD  5/11/2024 9:03 PM EDT  Workstation ID:  [id number]      Results for orders placed during the hospital encounter of 05/11/24    Doppler Arterial Multi Level Lower Extremity - Bilateral CAR    Interpretation Summary    Right Conclusion: The right HALIMA is mildly reduced. Mild digital insufficiency.    Left Conclusion: The left HALIMA is moderately reduced. Mild digital insufficiency.        ASSESSMENT / PLAN      Acute on chronic HFrEF (heart failure with reduced ejection fraction)    CHF exacerbation    Rhinovirus infection    NICCI-likely ATN in setting of contrast exposure and or diuresis.    Hypertension-hold losartan for now  Congestive heart failure-EF 20%  NSTEMI-Per cardiology.  Will need heart cath at some point  Rhinovirus infection  History of hypercoagulable state  Hyponatremia--with excess volunte. Diurese. Fluid restrict.     UA neg, renal US without obstructive uropathy.   Sodium a bit low, suspect with excess volume, increase Lasix/ fluid restrict  Avoid ACE inhibitors or ARB's-stopped losartan for now  Cautiously diurese, decrease Lasix to once daily  Would hold off on heart cath until creatinine improved.  Monitor renal function fluid status electrolytes        Albert Manzo MD  [facility]  [phone]  05/14/24  12:33 EDT

## 2024-05-14 NOTE — PLAN OF CARE
Problem: Adult Inpatient Plan of Care  Goal: Plan of Care Review  Outcome: Ongoing, Progressing     Problem: Adjustment to Illness (Sepsis/Septic Shock)  Goal: Optimal Coping  Outcome: Ongoing, Progressing     Problem: Bleeding (Sepsis/Septic Shock)  Goal: Absence of Bleeding  Outcome: Ongoing, Progressing     Problem: Glycemic Control Impaired (Sepsis/Septic Shock)  Goal: Blood Glucose Level Within Desired Range  Outcome: Ongoing, Progressing     Problem: Infection Progression (Sepsis/Septic Shock)  Goal: Absence of Infection Signs and Symptoms  Outcome: Ongoing, Progressing  Intervention: Promote Recovery  Recent Flowsheet Documentation  Taken 5/14/2024 0800 by Cecelia Carter RN  Activity Management: ambulated in room     Problem: Nutrition Impaired (Sepsis/Septic Shock)  Goal: Optimal Nutrition Intake  Outcome: Ongoing, Progressing     Problem: Skin Injury Risk Increased  Goal: Skin Health and Integrity  Outcome: Ongoing, Progressing     Problem: Fall Injury Risk  Goal: Absence of Fall and Fall-Related Injury  Outcome: Ongoing, Progressing  Intervention: Promote Injury-Free Environment  Recent Flowsheet Documentation  Taken 5/14/2024 0800 by Cecelia Carter, RN  Safety Promotion/Fall Prevention: safety round/check completed

## 2024-05-14 NOTE — CASE MANAGEMENT/SOCIAL WORK
Social Work Assessment  [facility]     Patient Name: Jen Cruz  MRN: [id number]  Today's Date: 5/14/2024    Admit Date: 5/11/2024         Discharge Plan       Row Name 05/14/24 0935       Plan    Plan Comments LSW scheduled PCP appointment with [facility] in [place], added to AVS.      LUIS MANUEL Mullen, MSW    Phone: [phone]  Fax: [phone]  Email: [email or web address]

## 2024-05-14 NOTE — DISCHARGE INSTR - OTHER ORDERS
Medicaid Transportation: [phone] schedule 48 hours.   Disability: [email or web address] or call [phone]

## 2024-05-14 NOTE — PROGRESS NOTES
[facility]     Progress Note    Patient Name: Jen Cruz  : 1970  MRN: [id number]  Primary Care Physician:  Provider, No Known  Date of admission: 2024  Service date and time: 24 11:42 EDT  Subjective   Subjective     Chief Complaint: SOB    HPI:  Patient Reports cough and SOB, feeling weak as well      Objective   Objective     Vitals:   Temp:  [97.7 °F (36.5 °C)-98.2 °F (36.8 °C)] 98 °F (36.7 °C)  Heart Rate:  [] 86  Resp:  [16-20] 18  BP: (122-140)/(70-95) 124/70  Flow (L/min):  [2-4] 2  Physical Exam    Constitutional: Awake, alert   Eyes: PERRLA, sclerae anicteric, no conjunctival injection   HENT: NCAT, mucous membranes moist   Neck: Supple, no thyromegaly, no lymphadenopathy, trachea midline   Respiratory: decreased BS, wheezing   Cardiovascular: RRR, no murmurs, rubs, or gallops, palpable pedal pulses bilaterally   Gastrointestinal: Positive bowel sounds, soft, nontender, nondistended   Musculoskeletal: 1+ pitting bilateral ankle edema, no clubbing or cyanosis to extremities   Psychiatric: Appropriate affect, cooperative   Neurologic: Oriented x 3, strength symmetric in all extremities, Cranial Nerves grossly intact to confrontation, speech clear   Skin: No rashes     Result Review    Result Review:  I have personally reviewed the results from the time of this admission to 2024 11:42 EDT and agree with these findings:  [x]  Laboratory list / accordion  [x]  Microbiology  [x]  Radiology  [x]  EKG/Telemetry   [x]  Cardiology/Vascular   []  Pathology  []  Old records  []  Other:        Assessment & Plan   Assessment / Plan       Active Hospital Problems:  Active Hospital Problems    Diagnosis     **Acute on chronic HFrEF (heart failure with reduced ejection fraction)     Rhinovirus infection     CHF exacerbation    Tobacco abuse  Morbid obesity  Hx of DVT/PE    Plan:    - imaging reviewed, cont IV abx, supportive care, supplemental oxygen, IS  - cont IV steroids, likely has  underlying COPD as well, duonebs  - IV lasix, cards following, strict I/O's  - Telemetry  - cont eliquis  - cont home meds as able  - trend labs  - PT/OT    DVT prophylaxis:  Medical DVT prophylaxis orders are present.        CODE STATUS:   Code Status (Patient has no pulse and is not breathing): CPR (Attempt to Resuscitate)  Medical Interventions (Patient has pulse or is breathing): Full Support    Disposition:  I expect patient to be discharged 2 days.    Reji Almeida MD

## 2024-05-15 LAB
ALBUMIN SERPL-MCNC: 3.6 G/DL (ref 3.5–5.2)
ALBUMIN/GLOB SERPL: 0.9 G/DL
ALP SERPL-CCNC: 111 U/L (ref 39–117)
ALT SERPL W P-5'-P-CCNC: 12 U/L (ref 1–33)
ANION GAP SERPL CALCULATED.3IONS-SCNC: 10 MMOL/L (ref 5–15)
AST SERPL-CCNC: 13 U/L (ref 1–32)
BASOPHILS # BLD AUTO: 0.03 10*3/MM3 (ref 0–0.2)
BASOPHILS NFR BLD AUTO: 0.2 % (ref 0–1.5)
BILIRUB SERPL-MCNC: 0.4 MG/DL (ref 0–1.2)
BUN SERPL-MCNC: 39 MG/DL (ref 6–20)
BUN/CREAT SERPL: 32.8 (ref 7–25)
CALCIUM SPEC-SCNC: 9.3 MG/DL (ref 8.6–10.5)
CHLORIDE SERPL-SCNC: 92 MMOL/L (ref 98–107)
CO2 SERPL-SCNC: 31 MMOL/L (ref 22–29)
CREAT SERPL-MCNC: 1.19 MG/DL (ref 0.57–1)
DEPRECATED RDW RBC AUTO: 46.4 FL (ref 37–54)
EGFRCR SERPLBLD CKD-EPI 2021: 55.1 ML/MIN/1.73
EOSINOPHIL # BLD AUTO: 0 10*3/MM3 (ref 0–0.4)
EOSINOPHIL NFR BLD AUTO: 0 % (ref 0.3–6.2)
ERYTHROCYTE [DISTWIDTH] IN BLOOD BY AUTOMATED COUNT: 16.6 % (ref 12.3–15.4)
GLOBULIN UR ELPH-MCNC: 3.8 GM/DL
GLUCOSE SERPL-MCNC: 201 MG/DL (ref 65–99)
HCT VFR BLD AUTO: 33.4 % (ref 34–46.6)
HGB BLD-MCNC: 9.9 G/DL (ref 12–15.9)
IMM GRANULOCYTES # BLD AUTO: 0.24 10*3/MM3 (ref 0–0.05)
IMM GRANULOCYTES NFR BLD AUTO: 1.4 % (ref 0–0.5)
LYMPHOCYTES # BLD AUTO: 1 10*3/MM3 (ref 0.7–3.1)
LYMPHOCYTES NFR BLD AUTO: 6 % (ref 19.6–45.3)
MAGNESIUM SERPL-MCNC: 2.1 MG/DL (ref 1.6–2.6)
MCH RBC QN AUTO: 23.1 PG (ref 26.6–33)
MCHC RBC AUTO-ENTMCNC: 29.6 G/DL (ref 31.5–35.7)
MCV RBC AUTO: 78 FL (ref 79–97)
MONOCYTES # BLD AUTO: 0.45 10*3/MM3 (ref 0.1–0.9)
MONOCYTES NFR BLD AUTO: 2.7 % (ref 5–12)
NEUTROPHILS NFR BLD AUTO: 14.84 10*3/MM3 (ref 1.7–7)
NEUTROPHILS NFR BLD AUTO: 89.7 % (ref 42.7–76)
NRBC BLD AUTO-RTO: 0.1 /100 WBC (ref 0–0.2)
PHOSPHATE SERPL-MCNC: 3.4 MG/DL (ref 2.5–4.5)
PLATELET # BLD AUTO: 380 10*3/MM3 (ref 140–450)
PMV BLD AUTO: 9.6 FL (ref 6–12)
POTASSIUM SERPL-SCNC: 4.8 MMOL/L (ref 3.5–5.2)
PROT SERPL-MCNC: 7.4 G/DL (ref 6–8.5)
RBC # BLD AUTO: 4.28 10*6/MM3 (ref 3.77–5.28)
SODIUM SERPL-SCNC: 133 MMOL/L (ref 136–145)
WBC NRBC COR # BLD AUTO: 16.56 10*3/MM3 (ref 3.4–10.8)

## 2024-05-15 PROCEDURE — 94799 UNLISTED PULMONARY SVC/PX: CPT

## 2024-05-15 PROCEDURE — 83735 ASSAY OF MAGNESIUM: CPT | Performed by: HOSPITALIST

## 2024-05-15 PROCEDURE — 25010000002 CEFTRIAXONE PER 250 MG: Performed by: HOSPITALIST

## 2024-05-15 PROCEDURE — 84100 ASSAY OF PHOSPHORUS: CPT | Performed by: INTERNAL MEDICINE

## 2024-05-15 PROCEDURE — 25010000002 FUROSEMIDE PER 20 MG: Performed by: INTERNAL MEDICINE

## 2024-05-15 PROCEDURE — 94664 DEMO&/EVAL PT USE INHALER: CPT

## 2024-05-15 PROCEDURE — 94761 N-INVAS EAR/PLS OXIMETRY MLT: CPT

## 2024-05-15 PROCEDURE — 25010000002 METHYLPREDNISOLONE PER 40 MG: Performed by: HOSPITALIST

## 2024-05-15 PROCEDURE — 80053 COMPREHEN METABOLIC PANEL: CPT | Performed by: HOSPITALIST

## 2024-05-15 PROCEDURE — 63710000001 PREDNISONE PER 1 MG: Performed by: INTERNAL MEDICINE

## 2024-05-15 PROCEDURE — 99232 SBSQ HOSP IP/OBS MODERATE 35: CPT | Performed by: INTERNAL MEDICINE

## 2024-05-15 PROCEDURE — 97161 PT EVAL LOW COMPLEX 20 MIN: CPT

## 2024-05-15 PROCEDURE — 85025 COMPLETE CBC W/AUTO DIFF WBC: CPT | Performed by: HOSPITALIST

## 2024-05-15 RX ORDER — TRAMADOL HYDROCHLORIDE 50 MG/1
50 TABLET ORAL EVERY 6 HOURS PRN
Status: DISPENSED | OUTPATIENT
Start: 2024-05-15 | End: 2024-05-20

## 2024-05-15 RX ORDER — PREDNISONE 20 MG/1
20 TABLET ORAL
Status: DISPENSED | OUTPATIENT
Start: 2024-05-15 | End: 2024-05-18

## 2024-05-15 RX ORDER — ACETAMINOPHEN 325 MG/1
650 TABLET ORAL EVERY 6 HOURS PRN
Status: DISCONTINUED | OUTPATIENT
Start: 2024-05-15 | End: 2024-05-20 | Stop reason: HOSPADM

## 2024-05-15 RX ORDER — LIDOCAINE 4 G/G
1 PATCH TOPICAL
Status: DISCONTINUED | OUTPATIENT
Start: 2024-05-15 | End: 2024-05-20 | Stop reason: HOSPADM

## 2024-05-15 RX ADMIN — AZITHROMYCIN DIHYDRATE 500 MG: 250 TABLET ORAL at 08:13

## 2024-05-15 RX ADMIN — Medication 10 ML: at 08:24

## 2024-05-15 RX ADMIN — METOPROLOL SUCCINATE 25 MG: 25 TABLET, FILM COATED, EXTENDED RELEASE ORAL at 08:13

## 2024-05-15 RX ADMIN — IPRATROPIUM BROMIDE AND ALBUTEROL SULFATE 3 ML: .5; 3 SOLUTION RESPIRATORY (INHALATION) at 11:20

## 2024-05-15 RX ADMIN — PREDNISONE 20 MG: 20 TABLET ORAL at 08:53

## 2024-05-15 RX ADMIN — ASPIRIN 81 MG: 81 TABLET, COATED ORAL at 08:14

## 2024-05-15 RX ADMIN — APIXABAN 5 MG: 5 TABLET, FILM COATED ORAL at 20:35

## 2024-05-15 RX ADMIN — APIXABAN 5 MG: 5 TABLET, FILM COATED ORAL at 08:14

## 2024-05-15 RX ADMIN — FUROSEMIDE 40 MG: 10 INJECTION, SOLUTION INTRAMUSCULAR; INTRAVENOUS at 05:22

## 2024-05-15 RX ADMIN — TRAMADOL HYDROCHLORIDE 50 MG: 50 TABLET, COATED ORAL at 20:37

## 2024-05-15 RX ADMIN — OXYCODONE 10 MG: 5 TABLET ORAL at 00:16

## 2024-05-15 RX ADMIN — Medication 1 PATCH: at 08:11

## 2024-05-15 RX ADMIN — CEFTRIAXONE 2000 MG: 2 INJECTION, POWDER, FOR SOLUTION INTRAMUSCULAR; INTRAVENOUS at 14:42

## 2024-05-15 RX ADMIN — METOPROLOL SUCCINATE 25 MG: 25 TABLET, FILM COATED, EXTENDED RELEASE ORAL at 20:35

## 2024-05-15 RX ADMIN — IPRATROPIUM BROMIDE AND ALBUTEROL SULFATE 3 ML: .5; 3 SOLUTION RESPIRATORY (INHALATION) at 15:42

## 2024-05-15 RX ADMIN — HYDRALAZINE HYDROCHLORIDE 25 MG: 25 TABLET ORAL at 20:35

## 2024-05-15 RX ADMIN — Medication 10 ML: at 20:38

## 2024-05-15 RX ADMIN — HYDROXYZINE HYDROCHLORIDE 25 MG: 25 TABLET, FILM COATED ORAL at 08:53

## 2024-05-15 RX ADMIN — HYDRALAZINE HYDROCHLORIDE 25 MG: 25 TABLET ORAL at 08:13

## 2024-05-15 RX ADMIN — METHYLPREDNISOLONE SODIUM SUCCINATE 40 MG: 40 INJECTION, POWDER, FOR SOLUTION INTRAMUSCULAR; INTRAVENOUS at 00:16

## 2024-05-15 RX ADMIN — IPRATROPIUM BROMIDE AND ALBUTEROL SULFATE 3 ML: .5; 3 SOLUTION RESPIRATORY (INHALATION) at 20:35

## 2024-05-15 RX ADMIN — LIDOCAINE 1 PATCH: 4 PATCH TOPICAL at 14:42

## 2024-05-15 RX ADMIN — FUROSEMIDE 40 MG: 10 INJECTION, SOLUTION INTRAMUSCULAR; INTRAVENOUS at 19:15

## 2024-05-15 RX ADMIN — TRAMADOL HYDROCHLORIDE 50 MG: 50 TABLET, COATED ORAL at 08:14

## 2024-05-15 RX ADMIN — IPRATROPIUM BROMIDE AND ALBUTEROL SULFATE 3 ML: .5; 3 SOLUTION RESPIRATORY (INHALATION) at 07:17

## 2024-05-15 NOTE — THERAPY EVALUATION
Patient Name: Jen Cruz  : 1970    MRN: [id number]                              Today's Date: 5/15/2024       Admit Date: 2024    Visit Dx:     ICD-10-CM ICD-9-CM   1. Acute on chronic congestive heart failure, unspecified heart failure type  I50.9 428.0   2. Chest pain, unspecified type  R07.9 786.50   3. Shortness of breath  R06.02 786.05   4. Pneumonia of both lungs due to infectious organism, unspecified part of lung  J18.9 483.8   5. Rhinovirus  B34.8 079.3   6. Sepsis without acute organ dysfunction, due to unspecified organism  A41.9 038.9     995.91   7. Acute on chronic HFrEF (heart failure with reduced ejection fraction)  I50.23 428.23     Patient Active Problem List   Diagnosis    CHF exacerbation    Acute on chronic HFrEF (heart failure with reduced ejection fraction)    Rhinovirus infection     Past Medical History:   Diagnosis Date    CHF (congestive heart failure)     GERD (gastroesophageal reflux disease)     Hypertension      History reviewed. No pertinent surgical history.   General Information       Row Name 05/15/24 1343          Physical Therapy Time and Intention    Document Type evaluation  -CR     Mode of Treatment physical therapy  -CR       Row Name 05/15/24 1343          General Information    Patient Profile Reviewed yes  -CR     Prior Level of Function independent:;all household mobility;community mobility;driving;ADL's  -CR     Existing Precautions/Restrictions no known precautions/restrictions  -CR     Barriers to Rehab none identified  -CR       Row Name 05/15/24 1343          Living Environment    People in Home sibling(s)  -CR       Row Name 05/15/24 1343          Home Main Entrance    Number of Stairs, Main Entrance none  -CR       Row Name 05/15/24 1343          Stairs Within Home, Primary    Number of Stairs, Within Home, Primary two  -CR       Row Name 05/15/24 1342          Cognition    Orientation Status (Cognition) oriented x 4  -CR       Row Name 05/15/24 1436           Safety Issues, Functional Mobility    Impairments Affecting Function (Mobility) shortness of breath;endurance/activity tolerance  -CR               User Key  (r) = Recorded By, (t) = Taken By, (c) = Cosigned By      Initials Name Provider Type    CR Reyes, Carmela, PT Physical Therapist                   Mobility       Row Name 05/15/24 1343          Bed Mobility    Bed Mobility bed mobility (all) activities  -CR     All Activities, [facility] (Bed Mobility) independent  -CR       Row Name 05/15/24 1343          Bed-Chair Transfer    Bed-Chair [facility] (Transfers) independent  -CR       Row Name 05/15/24 1343          Sit-Stand Transfer    Sit-Stand [facility] (Transfers) independent  -CR       Row Name 05/15/24 1343          Gait/Stairs (Locomotion)    [facility] Level (Gait) standby assist  -CR     Distance in Feet (Gait) 40  -CR               User Key  (r) = Recorded By, (t) = Taken By, (c) = Cosigned By      Initials Name Provider Type    CR Reyes, Carmela, PT Physical Therapist                   Obj/Interventions       Row Name 05/15/24 1343          Range of Motion Comprehensive    General Range of Motion no range of motion deficits identified  -CR       Row Name 05/15/24 1343          Strength Comprehensive (MMT)    General Manual Muscle Testing (MMT) Assessment no strength deficits identified  -CR       Row Name 05/15/24 1343          Balance    Balance Assessment sitting static balance;sitting dynamic balance;standing static balance;standing dynamic balance  -CR     Static Sitting Balance independent  -CR     Dynamic Sitting Balance independent  -CR     Position, Sitting Balance sitting edge of bed  -CR     Static Standing Balance independent  -CR     Dynamic Standing Balance independent  -CR       Row Name 05/15/24 1343          Sensory Assessment (Somatosensory)    Sensory Assessment (Somatosensory) sensation intact  -CR               User Key  (r) = Recorded By, (t) = Taken By, (c) =  Cosigned By      Initials Name Provider Type    CR Reyes, Carmela, PT Physical Therapist                   Goals/Plan    No documentation.                  Clinical Impression       Row Name 05/15/24 1341          Pain    Pretreatment Pain Rating 0/10 - no pain  -CR     Posttreatment Pain Rating 0/10 - no pain  -CR       Row Name 05/15/24 6880          Plan of Care Review    Plan of Care Reviewed With patient  -CR     Outcome Evaluation 54 y/o female admitted on 5/11 due to shortness of breath and found to have + rhinovirus, +pna, -PE. Patient reports of +PE last month diagnosed in another hospital where she was from. [place] PMH includes CHF, tobacco abuse. Patient normally independent, no a.d. used. At time of eval, patient remain independent with all bed mobility, transfers and gait. Currently on 2l/nc in bed with sats 100%. Ambulated 40 ft on room air with desat to 88% but quickly recovered upon sitting to 95%. Patient is at her baseline for mobility and anticipate no Physical therapy needs at discharge. Patient is safe to be up ad jacqueline. Will complete order.  -CR       Row Name 05/15/24 9521          Therapy Assessment/Plan (PT)    Patient/Family Therapy Goals Statement (PT) feel better  -CR     Criteria for Skilled Interventions Met (PT) no;no problems identified which require skilled intervention  -CR     Therapy Frequency (PT) evaluation only  -CR               User Key  (r) = Recorded By, (t) = Taken By, (c) = Cosigned By      Initials Name Provider Type    CR Reyes, Carmela, PT Physical Therapist                   Outcome Measures       Row Name 05/15/24 0654          How much help from another person do you currently need...    Turning from your back to your side while in flat bed without using bedrails? 4  -CR     Moving from lying on back to sitting on the side of a flat bed without bedrails? 4  -CR     Moving to and from a bed to a chair (including a wheelchair)? 4  -CR      Standing up from a chair using your arms (e.g., wheelchair, bedside chair)? 4  -CR     Climbing 3-5 steps with a railing? 4  -CR     To walk in hospital room? 4  -CR     AM-PAC 6 Clicks Score (PT) 24  -CR     Highest Level of Mobility Goal 8 --> Walked 250 feet or more  -CR               User Key  (r) = Recorded By, (t) = Taken By, (c) = Cosigned By      Initials Name Provider Type    CR Reyes, Carmela, PT Physical Therapist                                   PT Recommendation and Plan     Plan of Care Reviewed With: patient  Outcome Evaluation: 54 y/o female admitted on 5/11 due to shortness of breath and found to have + rhinovirus, +pna, -PE. Patient reports of +PE last month diagnosed in another hospital where she was from. [place] PMH includes CHF, tobacco abuse. Patient normally independent, no a.d. used. At time of eval, patient remain independent with all bed mobility, transfers and gait. Currently on 2l/nc in bed with sats 100%. Ambulated 40 ft on room air with desat to 88% but quickly recovered upon sitting to 95%. Patient is at her baseline for mobility and anticipate no Physical therapy needs at discharge. Patient is safe to be up ad jacqueline. Will complete order.     Time Calculation:         PT Charges       Row Name 05/15/24 1348             Time Calculation    Start Time 1133  -CR      Stop Time 1150  -CR      Time Calculation (min) 17 min  -CR      PT Received On 05/15/24  -CR         Time Calculation- PT    Total Timed Code Minutes- PT 0 minute(s)  -CR                User Key  (r) = Recorded By, (t) = Taken By, (c) = Cosigned By      Initials Name Provider Type    CR Reyes, Carmela, PT Physical Therapist                  Therapy Charges for Today       Code Description Service Date Service Provider Modifiers Qty    91498042480 HC PT EVAL LOW COMPLEXITY 4 5/15/2024 Reyes, Carmela, PT GP 1            PT G-Codes  AM-PAC 6 Clicks Score (PT): 24  PT Discharge Summary  Anticipated  Discharge Disposition (PT): home    Carmela Reyes, PT  5/15/2024

## 2024-05-15 NOTE — PLAN OF CARE
Goal Outcome Evaluation:   Patient is a/ox4, able to make needs known to staff. No needs at this time.

## 2024-05-15 NOTE — PROGRESS NOTES
CARDIOLOGY PROGRESS NOTE:    Jen Cruz  53 y.o.  female  1970  [id number]      Referring Provider: Hospitalist    Reason for follow-up: Shortness of breath and HFrEF     Patient Care Team:  Provider, No Known as PCP - Albert Russo MD as Consulting Physician (Nephrology)    Subjective .  Patient still continues to have shortness of breath but feeling much better than she she was    Objective lying in bed with mild respiratory distress     Review of Systems   Constitutional: Negative for fever and malaise/fatigue.   HENT:  Negative for ear pain and nosebleeds.    Eyes:  Negative for blurred vision and double vision.   Cardiovascular:  Negative for chest pain, dyspnea on exertion and palpitations.   Respiratory:  Positive for shortness of breath. Negative for cough.    Skin:  Negative for rash.   Musculoskeletal:  Negative for joint pain.   Gastrointestinal:  Negative for abdominal pain, nausea and vomiting.   Neurological:  Negative for focal weakness and headaches.   Psychiatric/Behavioral:  Negative for depression. The patient is not nervous/anxious.    All other systems reviewed and are negative.      Allergies: Heparin and Wasp venom    Scheduled Meds:apixaban, 5 mg, Oral, Q12H  aspirin, 81 mg, Oral, Daily  azithromycin, 500 mg, Oral, Q24H  cefTRIAXone, 2,000 mg, Intravenous, Q24H  furosemide, 40 mg, Intravenous, Q12H  hydrALAZINE, 25 mg, Oral, Q12H  ipratropium-albuterol, 3 mL, Nebulization, 4x Daily - RT  Lidocaine, 1 patch, Transdermal, Q24H  metoprolol succinate XL, 25 mg, Oral, Q12H  nicotine, 1 patch, Transdermal, Q24H  predniSONE, 20 mg, Oral, Daily With Breakfast  sodium chloride, 10 mL, Intravenous, Q12H      Continuous Infusions:   PRN Meds:.  acetaminophen    benzonatate    senna-docusate sodium **AND** polyethylene glycol **AND** bisacodyl **AND** bisacodyl    Calcium Replacement - Follow Nurse / BPA Driven Protocol    hydrOXYzine    ipratropium-albuterol    Magnesium Low Dose  "Replacement - Follow Nurse / BPA Driven Protocol    naloxone    Phosphorus Replacement - Follow Nurse / BPA Driven Protocol    Potassium Replacement - Follow Nurse / BPA Driven Protocol    [COMPLETED] Insert Peripheral IV **AND** sodium chloride    sodium chloride    sodium chloride    traMADol        VITAL SIGNS  Vitals:    05/15/24 0003 05/15/24 0300 05/15/24 0717 05/15/24 0721   BP: 140/89 125/84     BP Location: Left arm Left arm     Patient Position: Lying Lying     Pulse:   97 92   Resp: 22 18 17 18   Temp: 97.3 °F (36.3 °C) 98 °F (36.7 °C)     TempSrc: Oral Axillary     SpO2: 95%  94% 94%   Weight:       Height:           Flowsheet Rows      Flowsheet Row First Filed Value   Admission Height 165.1 cm (65\") Documented at 05/11/2024 2019   Admission Weight 95.3 kg (210 lb) Documented at 05/11/2024 2019             TELEMETRY: Sinus rhythm    Physical Exam:  Constitutional:       Appearance: Well-developed.   Eyes:      General: No scleral icterus.     Conjunctiva/sclera: Conjunctivae normal.      Pupils: Pupils are equal, round, and reactive to light.   HENT:      Head: Normocephalic and atraumatic.   Neck:      Vascular: No carotid bruit or JVD.   Pulmonary:      Effort: Pulmonary effort is normal.      Breath sounds: Normal breath sounds. No wheezing. No rales.   Cardiovascular:      Normal rate. Regular rhythm.   Pulses:     Intact distal pulses.   Abdominal:      General: Bowel sounds are normal.      Palpations: Abdomen is soft.   Musculoskeletal: Normal range of motion.      Cervical back: Normal range of motion and neck supple. Skin:     General: Skin is warm and dry.      Findings: No rash.   Neurological:      Mental Status: Alert.      Comments: No focal deficits          Results Review:   I reviewed the patient's new clinical results.  Lab Results (last 24 hours)       Procedure Component Value Units Date/Time    Magnesium [[id number]]  (Normal) Collected: 05/15/24 0837    Specimen: Blood Updated: " 05/15/24 0952     Magnesium 2.1 mg/dL     Comprehensive Metabolic Panel [[id number]]  (Abnormal) Collected: 05/15/24 0837    Specimen: Blood Updated: 05/15/24 0952     Glucose 201 mg/dL      BUN 39 mg/dL      Creatinine 1.19 mg/dL      Sodium 133 mmol/L      Potassium 4.8 mmol/L      Chloride 92 mmol/L      CO2 31.0 mmol/L      Calcium 9.3 mg/dL      Total Protein 7.4 g/dL      Albumin 3.6 g/dL      ALT (SGPT) 12 U/L      AST (SGOT) 13 U/L      Alkaline Phosphatase 111 U/L      Total Bilirubin 0.4 mg/dL      Globulin 3.8 gm/dL      A/G Ratio 0.9 g/dL      BUN/Creatinine Ratio 32.8     Anion Gap 10.0 mmol/L      eGFR 55.1 mL/min/1.73     Narrative:      GFR Normal >60  Chronic Kidney Disease <60  Kidney Failure <15      Phosphorus [[id number]]  (Normal) Collected: 05/15/24 0837    Specimen: Blood Updated: 05/15/24 0952     Phosphorus 3.4 mg/dL     CBC & Differential [[id number]]  (Abnormal) Collected: 05/15/24 0837    Specimen: Blood Updated: 05/15/24 0932    Narrative:      The following orders were created for panel order CBC & Differential.  Procedure                               Abnormality         Status                     ---------                               -----------         ------                     CBC Auto Differential[[id number]]        Abnormal            Final result                 Please view results for these tests on the individual orders.    CBC Auto Differential [[id number]]  (Abnormal) Collected: 05/15/24 0837    Specimen: Blood Updated: 05/15/24 0932     WBC 16.56 10*3/mm3      RBC 4.28 10*6/mm3      Hemoglobin 9.9 g/dL      Hematocrit 33.4 %      MCV 78.0 fL      MCH 23.1 pg      MCHC 29.6 g/dL      RDW 16.6 %      RDW-SD 46.4 fl      MPV 9.6 fL      Platelets 380 10*3/mm3      Neutrophil % 89.7 %      Lymphocyte % 6.0 %      Monocyte % 2.7 %      Eosinophil % 0.0 %      Basophil % 0.2 %      Immature Grans % 1.4 %      Neutrophils, Absolute 14.84 10*3/mm3      Lymphocytes, Absolute 1.00  10*3/mm3      Monocytes, Absolute 0.45 10*3/mm3      Eosinophils, Absolute 0.00 10*3/mm3      Basophils, Absolute 0.03 10*3/mm3      Immature Grans, Absolute 0.24 10*3/mm3      nRBC 0.1 /100 WBC     Blood Culture - Blood, Arm, Right [[id number]]  (Normal) Collected: 05/11/24 2147    Specimen: Blood from Arm, Right Updated: 05/14/24 2200     Blood Culture No growth at 3 days    Blood Culture - Blood, Arm, Left [[id number]]  (Normal) Collected: 05/11/24 2147    Specimen: Blood from Arm, Left Updated: 05/14/24 2200     Blood Culture No growth at 3 days            Imaging Results (Last 24 Hours)       ** No results found for the last 24 hours. **            EKG      I personally viewed and interpreted the patient's EKG/Telemetry data:    ECHOCARDIOGRAM:  Results for orders placed during the hospital encounter of 05/11/24    Adult Transthoracic Echo Complete w/ Color, Spectral and Contrast if Necessary Per Protocol    Interpretation Summary    Left ventricular systolic function is severely decreased. Calculated left ventricular 3D EF = 18% Left ventricular ejection fraction appears to be less than 20%.    Left ventricular diastolic function is consistent with (grade II w/high LAP) pseudonormalization.    Severely reduced right ventricular systolic function noted.    The right ventricular cavity is moderately dilated.    The left atrial cavity is moderate to severely dilated.    The right atrial cavity is moderate to severely  dilated.    Moderate tricuspid valve regurgitation is present.    Estimated right ventricular systolic pressure from tricuspid regurgitation is moderately elevated (45-55 mmHg).    Moderate to severe pulmonary hypertension is present.       STRESS MYOVIEW:       CARDIAC CATHETERIZATION:  No results found for this or any previous visit.       OTHER:         Assessment & Plan     HFrEF  Patient has congestive heart failure LV systolic dysfunction EF of 20%  Patient is not on any medicines  Patient will  be started on low-dose beta-blockers but cannot have ACE inhibitor's right now because of renal insufficiency  Patient also on IV Lasix but will monitor her renal function  Patient will have cardiac catheterization to rule out coronary artery disease  Patient is placed on hydralazine and the blood pressure and heart rate are stable and is tolerating well.     Non-STEMI  Patient had elevated troponin but could be type II MI but will need a cardiac evaluation because of her new diagnosis of heart failure  Patient is on aspirin and will check her lipid levels and also placed on statins.  Patient will have cardiac authorization performed.  Discussed with patient about procedure risks and benefits.  Patient still has high white counts but the renal function stable and will perform cardiac catheterization when she is more stable.     History of DVT and pulmonary embolism  Patient was admitted in [facility] for the same and was on Eliquis but did not take it regularly because of lack of insurance.  She will take her Eliquis now because she has insurance.  Patient had an echocardiogram which showed severe right ventricular systolic dysfunction also.     Acute respiratory failure  Patient has shortness of breath or viral pneumonia with rhinovirus but also had recent PE with right ventricular strain and dysfunction  Patient is on oxygen and her proBNP is also elevated and is on Lasix  Patient also on antibiotics for pneumonia.  White counts are still high.     Hypertension  Patient's blood pressure is currently stable on beta-blockers and losartan  Her losartan has been stopped because of renal insufficiency and will place on hydralazine.    Renal insufficiency  Patient has acute on chronic renal insufficiency and is followed by the nephrologist.  Patient is feeling better on Lasix but no ACE inhibitor's.    I discussed the patients findings and my recommendations with patient and nurse    Rob Garrido  MD  05/15/24  10:32 EDT

## 2024-05-15 NOTE — PROGRESS NOTES
"NEPHROLOGY PROGRESS NOTE------[facility]    [facility]  [phone]  Albert Manzo MD      Patient Care Team:  Provider, No Known as PCP - Albert Russo MD as Consulting Physician (Nephrology)      Provider:  Albert Manzo MD  Patient Name: Jen Cruz  :  1970    SUBJECTIVE:  F/U NICCI/CKD  No chest pain, still some SOA, improving    Medication:  apixaban, 5 mg, Oral, Q12H  aspirin, 81 mg, Oral, Daily  azithromycin, 500 mg, Oral, Q24H  cefTRIAXone, 2,000 mg, Intravenous, Q24H  furosemide, 40 mg, Intravenous, Q12H  hydrALAZINE, 25 mg, Oral, Q12H  ipratropium-albuterol, 3 mL, Nebulization, 4x Daily - RT  Lidocaine, 1 patch, Transdermal, Q24H  metoprolol succinate XL, 25 mg, Oral, Q12H  nicotine, 1 patch, Transdermal, Q24H  predniSONE, 20 mg, Oral, Daily With Breakfast  sodium chloride, 10 mL, Intravenous, Q12H           OBJECTIVE    Vital Sign Min/Max for last 24 hours  Temp  Min: 97.3 °F (36.3 °C)  Max: 98 °F (36.7 °C)   BP  Min: 115/65  Max: 142/89   Pulse  Min: 81  Max: 110   Resp  Min: 16  Max: 22   SpO2  Min: 91 %  Max: 100 %   No data recorded   Weight  Min: 91.2 kg (201 lb)  Max: 91.2 kg (201 lb)     Flowsheet Rows      Flowsheet Row First Filed Value   Admission Height 165.1 cm (65\") Documented at 2024   Admission Weight 95.3 kg (210 lb) Documented at 2024            No intake/output data recorded.  I/O last 3 completed shifts:  In: 720 [P.O.:720]  Out: 1200 [Urine:1200]    Physical Exam:  General Appearance: alert, appears stated age and cooperative  Head: normocephalic, without obvious abnormality and atraumatic  Eyes: conjunctivae and sclerae normal and no icterus  Neck: supple and no JVD  Lungs: scattered rhonchi  Heart: regular rhythm & normal rate and normal S1, S2  Chest: Wall no abnormalities observed  Abdomen: normal bowel sounds and soft, nontender  Extremities: moves extremities well, + edema, no " "cyanosis and no redness  Skin: no bleeding, bruising or rash, turgor normal, color normal and no lesions noted  Neurologic: Alert, and oriented. No focal deficits    Labs:    WBC WBC   Date Value Ref Range Status   05/15/2024 16.56 (H) 3.40 - 10.80 10*3/mm3 Final   05/14/2024 11.87 (H) 3.40 - 10.80 10*3/mm3 Final   05/13/2024 12.02 (H) 3.40 - 10.80 10*3/mm3 Final      HGB Hemoglobin   Date Value Ref Range Status   05/15/2024 9.9 (L) 12.0 - 15.9 g/dL Final   05/14/2024 10.5 (L) 12.0 - 15.9 g/dL Final   05/13/2024 10.3 (L) 12.0 - 15.9 g/dL Final   05/12/2024 12.8 12.0 - 17.0 g/dL Final      HCT Hematocrit   Date Value Ref Range Status   05/15/2024 33.4 (L) 34.0 - 46.6 % Final   05/14/2024 37.0 34.0 - 46.6 % Final   05/13/2024 35.8 34.0 - 46.6 % Final   05/12/2024 38 38 - 51 % Final      Platelets No results found for: \"LABPLAT\"   MCV MCV   Date Value Ref Range Status   05/15/2024 78.0 (L) 79.0 - 97.0 fL Final   05/14/2024 80.4 79.0 - 97.0 fL Final   05/13/2024 80.3 79.0 - 97.0 fL Final          Sodium Sodium   Date Value Ref Range Status   05/15/2024 133 (L) 136 - 145 mmol/L Final   05/14/2024 131 (L) 136 - 145 mmol/L Final   05/13/2024 137 136 - 145 mmol/L Final      Potassium Potassium   Date Value Ref Range Status   05/15/2024 4.8 3.5 - 5.2 mmol/L Final   05/14/2024 5.1 3.5 - 5.2 mmol/L Final   05/13/2024 4.9 3.5 - 5.2 mmol/L Final     Comment:     Slight hemolysis detected by analyzer. Result may be falsely elevated.      Chloride Chloride   Date Value Ref Range Status   05/15/2024 92 (L) 98 - 107 mmol/L Final   05/14/2024 91 (L) 98 - 107 mmol/L Final   05/13/2024 96 (L) 98 - 107 mmol/L Final      CO2 CO2   Date Value Ref Range Status   05/15/2024 31.0 (H) 22.0 - 29.0 mmol/L Final   05/14/2024 29.0 22.0 - 29.0 mmol/L Final   05/13/2024 28.0 22.0 - 29.0 mmol/L Final      BUN BUN   Date Value Ref Range Status   05/15/2024 39 (H) 6 - 20 mg/dL Final   05/14/2024 28 (H) 6 - 20 mg/dL Final   05/13/2024 23 (H) 6 - 20 " "mg/dL Final      Creatinine Creatinine   Date Value Ref Range Status   05/15/2024 1.19 (H) 0.57 - 1.00 mg/dL Final   05/14/2024 1.43 (H) 0.57 - 1.00 mg/dL Final   05/13/2024 1.68 (H) 0.57 - 1.00 mg/dL Final   05/12/2024 0.96 0.60 - 1.30 mg/dL Final     Comment:     Serial Number: [id number]:  [id number]      Calcium Calcium   Date Value Ref Range Status   05/15/2024 9.3 8.6 - 10.5 mg/dL Final   05/14/2024 9.1 8.6 - 10.5 mg/dL Final   05/13/2024 9.2 8.6 - 10.5 mg/dL Final      PO4 No components found for: \"PO4\"   Albumin Albumin   Date Value Ref Range Status   05/15/2024 3.6 3.5 - 5.2 g/dL Final   05/14/2024 3.6 3.5 - 5.2 g/dL Final   05/13/2024 3.5 3.5 - 5.2 g/dL Final      Magnesium Magnesium   Date Value Ref Range Status   05/15/2024 2.1 1.6 - 2.6 mg/dL Final   05/14/2024 2.0 1.6 - 2.6 mg/dL Final   05/13/2024 2.4 1.6 - 2.6 mg/dL Final      Uric Acid No components found for: \"URIC ACID\"     Imaging Results (Last 72 Hours)       Procedure Component Value Units Date/Time    US Renal Bilateral [[id number]] Collected: 05/13/24 1539     Updated: 05/13/24 1542    Narrative:      US RENAL BILATERAL    Date of Exam: 5/13/2024 3:19 PM EDT    Indication: Kidney failure, acute  NICCI.    Comparison: No comparisons available.    Technique: Grayscale and color Doppler ultrasound evaluation of the kidneys and urinary bladder was performed.      Findings:  RIGHT kidney measures 9.6 x 4.8 x 4.7 cm.  Kidney echogenicity, size, and vascularity appear within normal limits. There is no solid kidney mass.  No echogenic shadowing stone.  No hydronephrosis.    LEFT kidney measures 9.1 x 3.7 x 5.1 cm. Kidney echogenicity, size, and vascularity appear within normal limits. There is no solid kidney mass.  No echogenic shadowing stone.  No hydronephrosis.    Limited visualization of the urinary bladder is unremarkable.        Impression:      Impression:  Unremarkable renal ultrasound.        Electronically Signed: Adriana Castanon MD    " 5/13/2024 3:40 PM EDT    Workstation ID: [id number]            Results for orders placed during the hospital encounter of 05/11/24    XR Chest 1 View    Narrative  XR CHEST 1 VW    Date of Exam: 5/11/2024 9:01 PM EDT    Indication: chest pain    Comparison: None available.    Findings:  The heart appears enlarged. Patchy airspace disease is seen within the bilateral lower lobes in the perihilar region. Probable small bilateral pleural effusions are present. Chronic appearing interstitial changes are present with indistinctness of the  pulmonary vasculature.. No acute osseous abnormality identified.    Impression  Impression:  Bibasilar pneumonia with probable mild underlying pulmonary edema pattern and small bilateral pleural effusions. There is mild cardiomegaly..      Electronically Signed: Sarah Good MD  5/11/2024 9:03 PM EDT  Workstation ID: [id number]      Results for orders placed during the hospital encounter of 05/11/24    Doppler Arterial Multi Level Lower Extremity - Bilateral CAR    Interpretation Summary    Right Conclusion: The right HALIMA is mildly reduced. Mild digital insufficiency.    Left Conclusion: The left HALIMA is moderately reduced. Mild digital insufficiency.        ASSESSMENT / PLAN      Acute on chronic HFrEF (heart failure with reduced ejection fraction)    CHF exacerbation    Rhinovirus infection    NICCI-likely ATN in setting of contrast exposure and or diuresis.  Renal dose all without obstructive uropathy.  Hypertension-hold losartan for now  Congestive heart failure-EF 20%  NSTEMI-Per cardiology.  Will need heart cath at some point  Rhinovirus infection  History of hypercoagulable state  Hyponatremia--with excess volunte. Diurese. Fluid restrict.     Nonoliguric  Creatinine improving  Renal wise okay for heart cath when needed per cardiology  Monitor renal function fluid status electrolytes        Albert Manzo MD  [facility]  [phone]  05/15/24  12:20  EDT

## 2024-05-15 NOTE — PLAN OF CARE
Goal Outcome Evaluation:  Plan of Care Reviewed With: patient           Outcome Evaluation: 54 y/o female admitted on 5/11 due to shortness of breath and found to have + rhinovirus, +pna, -PE. Patient reports of +PE last month diagnosed in another hospital where she was from. [place] PMH includes CHF, tobacco abuse. Patient normally independent, no a.d. used. At time of eval, patient remain independent with all bed mobility, transfers and gait. Currently on 2l/nc in bed with sats 100%. Ambulated 40 ft on room air with desat to 88% but quickly recovered upon sitting to 95%. Patient is at her baseline for mobility and anticipate no Physical therapy needs at discharge. Patient is safe to be up ad jacqueline. Will complete order.      Anticipated Discharge Disposition (PT): home

## 2024-05-15 NOTE — PROGRESS NOTES
Hospitalist Service   Daily Progress Note      Patient Name: Jen Cruz  : 1970  MRN: [id number]  Primary Care Physician:  Provider, No Known  Date of admission: 2024      Subjective      Chief Complaint: Shortness of breath    Patient seen and examined this morning.  Taking over care today.  Still feels short of breath and has intermittent chest pain and back pain.  Was not taking any pain meds at home, was getting oxycodone here.  De-escalated to tramadol and Tylenol, lidocaine patch also ordered.  Awaiting nephrology clearance for [facility].    Pertinent positives as noted in HPI/subjective.  All other systems were reviewed and are negative.      Objective      Vitals:   Temp:  [97.3 °F (36.3 °C)-98 °F (36.7 °C)] 98 °F (36.7 °C)  Heart Rate:  [] 92  Resp:  [16-22] 18  BP: (115-142)/(65-89) 125/84  Flow (L/min):  [2] 2    Physical Exam:    General: Awake, alert, NAD  Eyes: PERRL, EOMI, conjunctivae are clear  Cardiovascular: Regular rate and rhythm, no murmurs  Respiratory: Rhonchi, rales and wheezing bilaterally, unlabored breathing  Abdomen: Soft, nontender, positive bowel sounds, no guarding  Neurologic: A&O, CN grossly intact, moves all extremities spontaneously  Musculoskeletal: Normal range of motion, no other gross deformities  Skin: Warm, dry         Result Review    Result Review:  I have personally reviewed the results from the time of this admission to 5/15/2024 10:28 EDT and agree with these findings:  [x]  Laboratory  [x]  Microbiology  [x]  Radiology  [x]  EKG/Telemetry   [x]  Cardiology/Vascular   []  Pathology  [x]  Old records  []  Other:          Assessment & Plan      Brief Patient Summary:  Jen Cruz is a 53 y.o. female with past medical history of hypertension, DVT/PE who presented to the ED with complaints of shortness of breath. She had echo showing severe LV dysfunction with EF around 20%. CT chest PE protocol was done did not show any pulmonary embolism. Viral panel was  positive for rhinovirus.  CT also consistent with bibasilar pneumonia.  Cardiology consult for new onset CHF.  Creatinine started to increase, nephrology consulted.      apixaban, 5 mg, Oral, Q12H  aspirin, 81 mg, Oral, Daily  azithromycin, 500 mg, Oral, Q24H  cefTRIAXone, 2,000 mg, Intravenous, Q24H  furosemide, 40 mg, Intravenous, Q12H  hydrALAZINE, 25 mg, Oral, Q12H  ipratropium-albuterol, 3 mL, Nebulization, 4x Daily - RT  Lidocaine, 1 patch, Transdermal, Q24H  metoprolol succinate XL, 25 mg, Oral, Q12H  nicotine, 1 patch, Transdermal, Q24H  predniSONE, 20 mg, Oral, Daily With Breakfast  sodium chloride, 10 mL, Intravenous, Q12H             I have utilized all available, immediate resources to obtain, update, or review the patient's current medications including all prescriptions, over-the-counter products, herbals, cannabis/cannabidiol products, and vitamin.mineral/dietary (nutritional) supplements.    Active Hospital Problems:  Active Hospital Problems    Diagnosis     **Acute on chronic HFrEF (heart failure with reduced ejection fraction)     Rhinovirus infection     CHF exacerbation        Assessment/Plan:     Dyspnea multifactorial from bibasilar bacterial pneumonia, unspecified bacteria  Acute COPD exacerbation  Viral URI with rhinovirus  -CTA chest negative for PE but does show bilateral bibasilar pneumonia  -Finished up antibiotics with IV ceftriaxone anisomycin  -RVP positive for rhinovirus  -Continue bronchodilators and de-escalate steroids as tolerated  -Wean down to room air as tolerated    New onset acute HFrEF  NSTEMI  -Echo this admission shows severe LV systolic dysfunction with EF 20%  -Troponin elevation noted, could be type II MI per cardiology  -Will need LHC performed, awaiting nephrology clearance due to elevated creatinine  -Continue diuresis with Lasix, monitor I's and O's  -Continue GDMT as per cardiology  -Cardiology following    NICCI  Hyponatremia  -Likely ATN in setting of contra  exposure and diuresis per nephrology  -Creatinine slowly improving  -Avoid ACE inhibitor or ARB for now  -Monitor sodium with diuresis  -Nephrology following    Hypertension  -Continue on metoprolol and hydralazine  -Monitor blood pressure, adjust as needed    History of DVT/PE  -Patient previously on Eliquis however was not taking it due to lack of insurance but now has insurance and willing to take Eliquis  -CTA chest negative for PE  -Continue Eliquis    CODE STATUS:    Code Status (Patient has no pulse and is not breathing): CPR (Attempt to Resuscitate)  Medical Interventions (Patient has pulse or is breathing): Full Support      Disposition:      Electronically signed by Yeimy Ortiz DO, 05/15/24, 10:28 EDT.  [facility] Hospitalist Team      Part of this note may be an electronic transcription/translation of spoken language to printed text using the Dragon Dictation System.

## 2024-05-16 LAB
1OH-MIDAZOLAM UR QL SCN: NOT DETECTED NG/MG CREAT
6MAM UR QL SCN: NEGATIVE NG/ML
7AMINOCLONAZEPAM/CREAT UR: NOT DETECTED NG/MG CREAT
A-OH ALPRAZ/CREAT UR: NOT DETECTED NG/MG CREAT
A-OH-TRIAZOLAM/CREAT UR CFM: NOT DETECTED NG/MG CREAT
ALBUMIN SERPL-MCNC: 3.3 G/DL (ref 3.5–5.2)
ALBUMIN/GLOB SERPL: 1 G/DL
ALP SERPL-CCNC: 125 U/L (ref 39–117)
ALPRAZ/CREAT UR CFM: NOT DETECTED NG/MG CREAT
ALT SERPL W P-5'-P-CCNC: 10 U/L (ref 1–33)
AMPHET UR CFM-MCNC: 757 NG/MG CREAT
AMPHETAMINES UR QL SCN: NORMAL NG/ML
AMPHETAMINES UR QL: NORMAL
ANION GAP SERPL CALCULATED.3IONS-SCNC: 9 MMOL/L (ref 5–15)
AST SERPL-CCNC: 11 U/L (ref 1–32)
BACTERIA SPEC AEROBE CULT: NORMAL
BACTERIA SPEC AEROBE CULT: NORMAL
BARBITURATES UR QL SCN: NEGATIVE NG/ML
BASOPHILS # BLD AUTO: 0.03 10*3/MM3 (ref 0–0.2)
BASOPHILS NFR BLD AUTO: 0.2 % (ref 0–1.5)
BENZODIAZ SCN METH UR: NORMAL
BILIRUB SERPL-MCNC: 0.4 MG/DL (ref 0–1.2)
BUN SERPL-MCNC: 33 MG/DL (ref 6–20)
BUN/CREAT SERPL: 36.3 (ref 7–25)
BUPRENORPHINE UR QL SCN: NEGATIVE
BUPRENORPHINE/CREAT UR: NOT DETECTED NG/MG CREAT
CALCIUM SPEC-SCNC: 8.8 MG/DL (ref 8.6–10.5)
CHLORIDE SERPL-SCNC: 96 MMOL/L (ref 98–107)
CLONAZEPAM/CREAT UR CFM: NOT DETECTED NG/MG CREAT
CO2 SERPL-SCNC: 32 MMOL/L (ref 22–29)
COCAINE+BZE UR QL SCN: NEGATIVE NG/ML
CODEINE UR CFM-MCNC: NOT DETECTED NG/MG CREAT
CREAT SERPL-MCNC: 0.91 MG/DL (ref 0.57–1)
CREAT UR-MCNC: 21 MG/DL
DEPRECATED RDW RBC AUTO: 46.6 FL (ref 37–54)
DESALKYLFLURAZ/CREAT UR: NOT DETECTED NG/MG CREAT
DHC/CREAT UR: NOT DETECTED NG/MG CREAT
DIAZEPAM/CREAT UR: NOT DETECTED NG/MG CREAT
EGFRCR SERPLBLD CKD-EPI 2021: 76.1 ML/MIN/1.73
EOSINOPHIL # BLD AUTO: 0 10*3/MM3 (ref 0–0.4)
EOSINOPHIL NFR BLD AUTO: 0 % (ref 0.3–6.2)
ERYTHROCYTE [DISTWIDTH] IN BLOOD BY AUTOMATED COUNT: 16.9 % (ref 12.3–15.4)
ETHANOL UR QL SCN: NEGATIVE G/DL
FENTANYL CTO UR SCN-MCNC: NEGATIVE NG/ML
FENTANYL/CREAT UR: NOT DETECTED NG/MG CREAT
FLUNITRAZEPAM UR QL SCN: NOT DETECTED NG/MG CREAT
GLOBULIN UR ELPH-MCNC: 3.2 GM/DL
GLUCOSE SERPL-MCNC: 147 MG/DL (ref 65–99)
HCT VFR BLD AUTO: 34.3 % (ref 34–46.6)
HGB BLD-MCNC: 10.2 G/DL (ref 12–15.9)
HYDROCODONE UR CFM-MCNC: NOT DETECTED NG/MG CREAT
HYDROMORPHONE UR CFM-MCNC: NOT DETECTED NG/MG CREAT
IMM GRANULOCYTES # BLD AUTO: 0.3 10*3/MM3 (ref 0–0.05)
IMM GRANULOCYTES NFR BLD AUTO: 1.5 % (ref 0–0.5)
LORAZEPAM/CREAT UR: 176 NG/MG CREAT
LYMPHOCYTES # BLD AUTO: 2.07 10*3/MM3 (ref 0.7–3.1)
LYMPHOCYTES NFR BLD AUTO: 10.4 % (ref 19.6–45.3)
MAGNESIUM SERPL-MCNC: 2.1 MG/DL (ref 1.6–2.6)
MCH RBC QN AUTO: 23.2 PG (ref 26.6–33)
MCHC RBC AUTO-ENTMCNC: 29.7 G/DL (ref 31.5–35.7)
MCV RBC AUTO: 78 FL (ref 79–97)
MDA UR CFM-MCNC: NOT DETECTED NG/MG CREAT
MDMA UR CFM-MCNC: NOT DETECTED NG/MG CREAT
METHADONE UR QL SCN: NEGATIVE NG/ML
METHADONE+METAB UR QL SCN: NEGATIVE NG/ML
METHAMPHET UR CFM-MCNC: 4676 NG/MG CREAT
MIDAZOLAM/CREAT UR CFM: NOT DETECTED NG/MG CREAT
MONOCYTES # BLD AUTO: 1.57 10*3/MM3 (ref 0.1–0.9)
MONOCYTES NFR BLD AUTO: 7.9 % (ref 5–12)
MORPHINE UR CFM-MCNC: 557 NG/MG CREAT
NEUTROPHILS NFR BLD AUTO: 15.91 10*3/MM3 (ref 1.7–7)
NEUTROPHILS NFR BLD AUTO: 80 % (ref 42.7–76)
NORBUPRENORPHINE/CREAT UR: NOT DETECTED NG/MG CREAT
NORCODEINE/CREAT UR CFM: NOT DETECTED NG/MG CREAT
NORDIAZEPAM/CREAT UR: NOT DETECTED NG/MG CREAT
NORFENTANYL/CREAT UR: NOT DETECTED NG/MG CREAT
NORFLUNITRAZEPAM UR-MCNC: NOT DETECTED NG/MG CREAT
NORHYDROCODONE UR CFM-MCNC: NOT DETECTED NG/MG CREAT
NORMORPHINE UR-MCNC: NOT DETECTED NG/MG CREAT
NOROXYCODONE UR CFM-MCNC: 1752 NG/MG CREAT
NOROXYMORPHONE/CREAT UR CFM: NOT DETECTED NG/MG CREAT
NRBC BLD AUTO-RTO: 0 /100 WBC (ref 0–0.2)
OPIATES UR QL CFM: NORMAL
OPIATES UR SCN-MCNC: NORMAL NG/ML
OXAZEPAM/CREAT UR: NOT DETECTED NG/MG CREAT
OXYCODONE CTO UR SCN-MCNC: NORMAL NG/ML
OXYCODONE UR CFM-MCNC: 3505 NG/MG CREAT
OXYCODONE UR QL CFM: NORMAL
OXYMORPHONE UR CFM-MCNC: 490 NG/MG CREAT
PCP UR QL SCN: NEGATIVE NG/ML
PHOSPHATE SERPL-MCNC: 2 MG/DL (ref 2.5–4.5)
PLATELET # BLD AUTO: 383 10*3/MM3 (ref 140–450)
PMV BLD AUTO: 9.4 FL (ref 6–12)
POTASSIUM SERPL-SCNC: 4.2 MMOL/L (ref 3.5–5.2)
PRESCRIBED MEDICATIONS: NORMAL
PROT SERPL-MCNC: 6.5 G/DL (ref 6–8.5)
RBC # BLD AUTO: 4.4 10*6/MM3 (ref 3.77–5.28)
SODIUM SERPL-SCNC: 137 MMOL/L (ref 136–145)
TAPENTADOL CTO UR SCN-MCNC: NEGATIVE NG/ML
TEMAZEPAM/CREAT UR: NOT DETECTED NG/MG CREAT
TRAMADOL UR QL SCN: NEGATIVE NG/ML
WBC NRBC COR # BLD AUTO: 19.88 10*3/MM3 (ref 3.4–10.8)

## 2024-05-16 PROCEDURE — 80053 COMPREHEN METABOLIC PANEL: CPT | Performed by: HOSPITALIST

## 2024-05-16 PROCEDURE — 85025 COMPLETE CBC W/AUTO DIFF WBC: CPT | Performed by: HOSPITALIST

## 2024-05-16 PROCEDURE — 99232 SBSQ HOSP IP/OBS MODERATE 35: CPT | Performed by: INTERNAL MEDICINE

## 2024-05-16 PROCEDURE — 94799 UNLISTED PULMONARY SVC/PX: CPT

## 2024-05-16 PROCEDURE — 83735 ASSAY OF MAGNESIUM: CPT | Performed by: HOSPITALIST

## 2024-05-16 PROCEDURE — 63710000001 PREDNISONE PER 1 MG: Performed by: INTERNAL MEDICINE

## 2024-05-16 PROCEDURE — 25010000002 FUROSEMIDE PER 20 MG: Performed by: INTERNAL MEDICINE

## 2024-05-16 PROCEDURE — 84100 ASSAY OF PHOSPHORUS: CPT | Performed by: INTERNAL MEDICINE

## 2024-05-16 RX ORDER — NICOTINE 21 MG/24HR
1 PATCH, TRANSDERMAL 24 HOURS TRANSDERMAL ONCE
Status: COMPLETED | OUTPATIENT
Start: 2024-05-16 | End: 2024-05-17

## 2024-05-16 RX ADMIN — Medication 600 MG: at 14:48

## 2024-05-16 RX ADMIN — METOPROLOL SUCCINATE 25 MG: 25 TABLET, FILM COATED, EXTENDED RELEASE ORAL at 20:28

## 2024-05-16 RX ADMIN — IPRATROPIUM BROMIDE AND ALBUTEROL SULFATE 3 ML: .5; 3 SOLUTION RESPIRATORY (INHALATION) at 15:40

## 2024-05-16 RX ADMIN — Medication 10 ML: at 20:29

## 2024-05-16 RX ADMIN — APIXABAN 5 MG: 5 TABLET, FILM COATED ORAL at 11:01

## 2024-05-16 RX ADMIN — METOPROLOL SUCCINATE 25 MG: 25 TABLET, FILM COATED, EXTENDED RELEASE ORAL at 11:01

## 2024-05-16 RX ADMIN — Medication 1 PATCH: at 11:04

## 2024-05-16 RX ADMIN — PREDNISONE 20 MG: 20 TABLET ORAL at 11:01

## 2024-05-16 RX ADMIN — Medication 1 PATCH: at 17:22

## 2024-05-16 RX ADMIN — HYDRALAZINE HYDROCHLORIDE 25 MG: 25 TABLET ORAL at 20:28

## 2024-05-16 RX ADMIN — AZITHROMYCIN DIHYDRATE 500 MG: 250 TABLET ORAL at 11:01

## 2024-05-16 RX ADMIN — Medication 10 ML: at 11:04

## 2024-05-16 RX ADMIN — HYDROXYZINE HYDROCHLORIDE 25 MG: 25 TABLET, FILM COATED ORAL at 21:55

## 2024-05-16 RX ADMIN — HYDRALAZINE HYDROCHLORIDE 25 MG: 25 TABLET ORAL at 11:01

## 2024-05-16 RX ADMIN — FUROSEMIDE 40 MG: 10 INJECTION, SOLUTION INTRAMUSCULAR; INTRAVENOUS at 05:16

## 2024-05-16 RX ADMIN — ASPIRIN 81 MG: 81 TABLET, COATED ORAL at 11:01

## 2024-05-16 RX ADMIN — Medication 600 MG: at 20:28

## 2024-05-16 RX ADMIN — LIDOCAINE 1 PATCH: 4 PATCH TOPICAL at 11:01

## 2024-05-16 NOTE — PROGRESS NOTES
"NEPHROLOGY PROGRESS NOTE------[facility]    [facility]  [phone]  Albert Manzo MD      Patient Care Team:  Provider, No Known as PCP - Albert Russo MD as Consulting Physician (Nephrology)      Provider:  Albert Manzo MD  Patient Name: Jen Cruz  :  1970    SUBJECTIVE:  F/U NICCI/CKD  No chest pain, still some SOA, improving    Medication:  apixaban, 5 mg, Oral, Q12H  aspirin, 81 mg, Oral, Daily  furosemide, 40 mg, Intravenous, Q12H  hydrALAZINE, 25 mg, Oral, Q12H  ipratropium-albuterol, 3 mL, Nebulization, 4x Daily - RT  Lidocaine, 1 patch, Transdermal, Q24H  metoprolol succinate XL, 25 mg, Oral, Q12H  nicotine, 1 patch, Transdermal, Q24H  predniSONE, 20 mg, Oral, Daily With Breakfast  sodium chloride, 10 mL, Intravenous, Q12H           OBJECTIVE    Vital Sign Min/Max for last 24 hours  Temp  Min: 97.6 °F (36.4 °C)  Max: 98.2 °F (36.8 °C)   BP  Min: 122/72  Max: 145/74   Pulse  Min: 72  Max: 94   Resp  Min: 14  Max: 16   SpO2  Min: 92 %  Max: 98 %   No data recorded   No data recorded     Flowsheet Rows      Flowsheet Row First Filed Value   Admission Height 165.1 cm (65\") Documented at 2024   Admission Weight 95.3 kg (210 lb) Documented at 2024            No intake/output data recorded.  No intake/output data recorded.    Physical Exam:  General Appearance: alert, appears stated age and cooperative  Head: normocephalic, without obvious abnormality and atraumatic  Eyes: conjunctivae and sclerae normal and no icterus  Neck: supple and no JVD  Lungs: scattered rhonchi  Heart: regular rhythm & normal rate and normal S1, S2  Chest: Wall no abnormalities observed  Abdomen: normal bowel sounds and soft, nontender  Extremities: moves extremities well, + edema, no cyanosis and no redness  Skin: no bleeding, bruising or rash, turgor normal, color normal and no lesions noted  Neurologic: Alert, and oriented. No " "focal deficits    Labs:    WBC WBC   Date Value Ref Range Status   05/16/2024 19.88 (H) 3.40 - 10.80 10*3/mm3 Final   05/15/2024 16.56 (H) 3.40 - 10.80 10*3/mm3 Final   05/14/2024 11.87 (H) 3.40 - 10.80 10*3/mm3 Final      HGB Hemoglobin   Date Value Ref Range Status   05/16/2024 10.2 (L) 12.0 - 15.9 g/dL Final   05/15/2024 9.9 (L) 12.0 - 15.9 g/dL Final   05/14/2024 10.5 (L) 12.0 - 15.9 g/dL Final      HCT Hematocrit   Date Value Ref Range Status   05/16/2024 34.3 34.0 - 46.6 % Final   05/15/2024 33.4 (L) 34.0 - 46.6 % Final   05/14/2024 37.0 34.0 - 46.6 % Final      Platelets No results found for: \"LABPLAT\"   MCV MCV   Date Value Ref Range Status   05/16/2024 78.0 (L) 79.0 - 97.0 fL Final   05/15/2024 78.0 (L) 79.0 - 97.0 fL Final   05/14/2024 80.4 79.0 - 97.0 fL Final          Sodium Sodium   Date Value Ref Range Status   05/16/2024 137 136 - 145 mmol/L Final   05/15/2024 133 (L) 136 - 145 mmol/L Final   05/14/2024 131 (L) 136 - 145 mmol/L Final      Potassium Potassium   Date Value Ref Range Status   05/16/2024 4.2 3.5 - 5.2 mmol/L Final   05/15/2024 4.8 3.5 - 5.2 mmol/L Final   05/14/2024 5.1 3.5 - 5.2 mmol/L Final      Chloride Chloride   Date Value Ref Range Status   05/16/2024 96 (L) 98 - 107 mmol/L Final   05/15/2024 92 (L) 98 - 107 mmol/L Final   05/14/2024 91 (L) 98 - 107 mmol/L Final      CO2 CO2   Date Value Ref Range Status   05/16/2024 32.0 (H) 22.0 - 29.0 mmol/L Final   05/15/2024 31.0 (H) 22.0 - 29.0 mmol/L Final   05/14/2024 29.0 22.0 - 29.0 mmol/L Final      BUN BUN   Date Value Ref Range Status   05/16/2024 33 (H) 6 - 20 mg/dL Final   05/15/2024 39 (H) 6 - 20 mg/dL Final   05/14/2024 28 (H) 6 - 20 mg/dL Final      Creatinine Creatinine   Date Value Ref Range Status   05/16/2024 0.91 0.57 - 1.00 mg/dL Final   05/15/2024 1.19 (H) 0.57 - 1.00 mg/dL Final   05/14/2024 1.43 (H) 0.57 - 1.00 mg/dL Final      Calcium Calcium   Date Value Ref Range Status   05/16/2024 8.8 8.6 - 10.5 mg/dL Final " "  05/15/2024 9.3 8.6 - 10.5 mg/dL Final   05/14/2024 9.1 8.6 - 10.5 mg/dL Final      PO4 No components found for: \"PO4\"   Albumin Albumin   Date Value Ref Range Status   05/16/2024 3.3 (L) 3.5 - 5.2 g/dL Final   05/15/2024 3.6 3.5 - 5.2 g/dL Final   05/14/2024 3.6 3.5 - 5.2 g/dL Final      Magnesium Magnesium   Date Value Ref Range Status   05/16/2024 2.1 1.6 - 2.6 mg/dL Final   05/15/2024 2.1 1.6 - 2.6 mg/dL Final   05/14/2024 2.0 1.6 - 2.6 mg/dL Final      Uric Acid No components found for: \"URIC ACID\"     Imaging Results (Last 72 Hours)       Procedure Component Value Units Date/Time    US Renal Bilateral [[id number]] Collected: 05/13/24 1539     Updated: 05/13/24 1542    Narrative:      US RENAL BILATERAL    Date of Exam: 5/13/2024 3:19 PM EDT    Indication: Kidney failure, acute  NICCI.    Comparison: No comparisons available.    Technique: Grayscale and color Doppler ultrasound evaluation of the kidneys and urinary bladder was performed.      Findings:  RIGHT kidney measures 9.6 x 4.8 x 4.7 cm.  Kidney echogenicity, size, and vascularity appear within normal limits. There is no solid kidney mass.  No echogenic shadowing stone.  No hydronephrosis.    LEFT kidney measures 9.1 x 3.7 x 5.1 cm. Kidney echogenicity, size, and vascularity appear within normal limits. There is no solid kidney mass.  No echogenic shadowing stone.  No hydronephrosis.    Limited visualization of the urinary bladder is unremarkable.        Impression:      Impression:  Unremarkable renal ultrasound.        Electronically Signed: Adriana Castanon MD    5/13/2024 3:40 PM EDT    Workstation ID: [id number]            Results for orders placed during the hospital encounter of 05/11/24    XR Chest 1 View    Narrative  XR CHEST 1 VW    Date of Exam: 5/11/2024 9:01 PM EDT    Indication: chest pain    Comparison: None available.    Findings:  The heart appears enlarged. Patchy airspace disease is seen within the bilateral lower lobes in the perihilar " region. Probable small bilateral pleural effusions are present. Chronic appearing interstitial changes are present with indistinctness of the  pulmonary vasculature.. No acute osseous abnormality identified.    Impression  Impression:  Bibasilar pneumonia with probable mild underlying pulmonary edema pattern and small bilateral pleural effusions. There is mild cardiomegaly..      Electronically Signed: Sarah Good MD  5/11/2024 9:03 PM EDT  Workstation ID: [id number]      Results for orders placed during the hospital encounter of 05/11/24    Doppler Arterial Multi Level Lower Extremity - Bilateral CAR    Interpretation Summary    Right Conclusion: The right HALIMA is mildly reduced. Mild digital insufficiency.    Left Conclusion: The left HALIMA is moderately reduced. Mild digital insufficiency.        ASSESSMENT / PLAN      Acute on chronic HFrEF (heart failure with reduced ejection fraction)    CHF exacerbation    Rhinovirus infection    NICCI-likely ATN in setting of contrast exposure and or diuresis.  Renal dose all without obstructive uropathy.  Hypertension-hold losartan for now  Congestive heart failure-EF 20%  NSTEMI-Per cardiology.    Rhinovirus infection  History of hypercoagulable state  Hyponatremia--with excess volunte. Diurese. Fluid restrict.     Nonoliguric  Creatinine improving  Heart cath in am  Add mucomyst  Hold PM diuretics  Monitor renal function fluid status electrolytes        Albert Manzo MD  [facility]  [phone]  05/16/24  11:37 EDT

## 2024-05-16 NOTE — PROGRESS NOTES
Hospitalist Service   Daily Progress Note      Patient Name: Jen Cruz  : 1970  MRN: [id number]  Primary Care Physician:  Provider, No Known  Date of admission: 2024      Subjective      Chief Complaint: Shortness of breath    Patient seen and examined this morning.  Doing much better today, breathing is significantly improved and her back pain is better controlled.  Awaiting cardiology evaluation regarding possible LHC.  Nephrology has cleared patient for LHC.    Pertinent positives as noted in HPI/subjective.  All other systems were reviewed and are negative.      Objective      Vitals:   Temp:  [97.6 °F (36.4 °C)-98.2 °F (36.8 °C)] 98.2 °F (36.8 °C)  Heart Rate:  [72-94] 90  Resp:  [14-18] 14  BP: (122-145)/(70-78) 122/72  Flow (L/min):  [2] 2    Physical Exam:    General: Awake, alert, NAD  Eyes: PERRL, EOMI, conjunctivae are clear  Cardiovascular: Regular rate and rhythm, no murmurs  Respiratory: Rhonchi, rales and wheezing bilaterally, significantly improved, unlabored breathing  Abdomen: Soft, nontender, positive bowel sounds, no guarding  Neurologic: A&O, CN grossly intact, moves all extremities spontaneously  Musculoskeletal: Normal range of motion, no other gross deformities  Skin: Warm, dry         Result Review    Result Review:  I have personally reviewed the results from the time of this admission to 2024 10:05 EDT and agree with these findings:  [x]  Laboratory  []  Microbiology  []  Radiology  []  EKG/Telemetry   []  Cardiology/Vascular   []  Pathology  []  Old records  []  Other:          Assessment & Plan      Brief Patient Summary:  Jen Cruz is a 53 y.o. female with past medical history of hypertension, DVT/PE who presented to the ED with complaints of shortness of breath. She had echo showing severe LV dysfunction with EF around 20%. CT chest PE protocol was done did not show any pulmonary embolism. Viral panel was positive for rhinovirus.  CT also consistent with bibasilar  pneumonia.  Cardiology consult for new onset CHF.  Creatinine started to increase, nephrology consulted.      apixaban, 5 mg, Oral, Q12H  aspirin, 81 mg, Oral, Daily  azithromycin, 500 mg, Oral, Q24H  furosemide, 40 mg, Intravenous, Q12H  hydrALAZINE, 25 mg, Oral, Q12H  ipratropium-albuterol, 3 mL, Nebulization, 4x Daily - RT  Lidocaine, 1 patch, Transdermal, Q24H  metoprolol succinate XL, 25 mg, Oral, Q12H  nicotine, 1 patch, Transdermal, Q24H  predniSONE, 20 mg, Oral, Daily With Breakfast  sodium chloride, 10 mL, Intravenous, Q12H             I have utilized all available, immediate resources to obtain, update, or review the patient's current medications including all prescriptions, over-the-counter products, herbals, cannabis/cannabidiol products, and vitamin.mineral/dietary (nutritional) supplements.    Active Hospital Problems:  Active Hospital Problems    Diagnosis     **Acute on chronic HFrEF (heart failure with reduced ejection fraction)     Rhinovirus infection     CHF exacerbation        Assessment/Plan:     Dyspnea multifactorial from bibasilar bacterial pneumonia, unspecified bacteria  Acute COPD exacerbation  Viral URI with rhinovirus  -CTA chest negative for PE but does show bilateral bibasilar pneumonia  -Finished up antibiotics with IV ceftriaxone and azithromycin  -RVP positive for rhinovirus  -Continue bronchodilators and de-escalate steroids as tolerated  -Wean down to room air as tolerated    New onset acute HFrEF  NSTEMI  -Echo this admission shows severe LV systolic dysfunction with EF 20%  -Troponin elevation noted, could be type II MI per cardiology  -Will need LHC performed, nephrology has cleared patient for LHC, awaiting cardiology to decide on cath  -Continue diuresis with Lasix, monitor I's and O's  -Continue GDMT as per cardiology  -Cardiology following    Leukocytosis  -Reactive from steroids, patient clinically improving  -Finish up antibiotics as above    NICCI  Hyponatremia  -Likely  ATN in setting of contra exposure and diuresis per nephrology  -Creatinine slowly improving  -Avoid ACE inhibitor or ARB for now  -Monitor sodium with diuresis  -Nephrology following    Hypertension  -Continue on metoprolol and hydralazine  -Monitor blood pressure, adjust as needed    History of DVT/PE  -Patient previously on Eliquis however was not taking it due to lack of insurance but now has insurance and willing to take Eliquis  -CTA chest negative for PE  -Continue Eliquis    CODE STATUS:    Code Status (Patient has no pulse and is not breathing): CPR (Attempt to Resuscitate)  Medical Interventions (Patient has pulse or is breathing): Full Support      Disposition: Pending clinical course.    Electronically signed by Yeimy Ortiz DO, 05/16/24, 10:05 EDT.  [facility] Hospitalist Team      Part of this note may be an electronic transcription/translation of spoken language to printed text using the Dragon Dictation System.

## 2024-05-16 NOTE — PLAN OF CARE
Goal Outcome Evaluation:         Pt with no complaints of pain at this time. Call light within reach. Vital signs stable. Plan of care ongoing.

## 2024-05-16 NOTE — PROGRESS NOTES
CARDIOLOGY PROGRESS NOTE:    Jen Cruz  53 y.o.  female  1970  [id number]      Referring Provider: Hospitalist    Reason for follow-up: Shortness of breath and HFrEF     Patient Care Team:  Provider, No Known as PCP - Albert Russo MD as Consulting Physician (Nephrology)    Subjective .  Doing well without any symptoms today    Objective lying in bed with mild respiratory distress     Review of Systems   Constitutional: Negative for fever and malaise/fatigue.   HENT:  Negative for ear pain and nosebleeds.    Eyes:  Negative for blurred vision and double vision.   Cardiovascular:  Negative for chest pain, dyspnea on exertion and palpitations.   Respiratory:  Negative for cough and shortness of breath.    Skin:  Negative for rash.   Musculoskeletal:  Negative for joint pain.   Gastrointestinal:  Negative for abdominal pain, nausea and vomiting.   Neurological:  Negative for focal weakness and headaches.   Psychiatric/Behavioral:  Negative for depression. The patient is not nervous/anxious.    All other systems reviewed and are negative.      Allergies: Heparin and Wasp venom    Scheduled Meds:Acetylcysteine, 600 mg, Oral, BID  apixaban, 5 mg, Oral, Q12H  aspirin, 81 mg, Oral, Daily  [Held by provider] furosemide, 40 mg, Intravenous, Q12H  hydrALAZINE, 25 mg, Oral, Q12H  ipratropium-albuterol, 3 mL, Nebulization, 4x Daily - RT  Lidocaine, 1 patch, Transdermal, Q24H  metoprolol succinate XL, 25 mg, Oral, Q12H  nicotine, 1 patch, Transdermal, Q24H  predniSONE, 20 mg, Oral, Daily With Breakfast  sodium chloride, 10 mL, Intravenous, Q12H      Continuous Infusions:   PRN Meds:.  acetaminophen    benzonatate    senna-docusate sodium **AND** polyethylene glycol **AND** bisacodyl **AND** bisacodyl    Calcium Replacement - Follow Nurse / BPA Driven Protocol    hydrOXYzine    ipratropium-albuterol    Magnesium Low Dose Replacement - Follow Nurse / BPA Driven Protocol    naloxone    Phosphorus Replacement -  "Follow Nurse / BPA Driven Protocol    Potassium Replacement - Follow Nurse / BPA Driven Protocol    [COMPLETED] Insert Peripheral IV **AND** sodium chloride    sodium chloride    sodium chloride    traMADol        VITAL SIGNS  Vitals:    05/15/24 2038 05/16/24 0005 05/16/24 0455 05/16/24 0742   BP:   124/78 122/72   BP Location:    Left arm   Patient Position:    Lying   Pulse: 84  94 90   Resp: 16   14   Temp:  98 °F (36.7 °C)  98.2 °F (36.8 °C)   TempSrc:  Oral  Oral   SpO2: 92%  96% 95%   Weight:       Height:           Flowsheet Rows      Flowsheet Row First Filed Value   Admission Height 165.1 cm (65\") Documented at 05/11/2024 2019   Admission Weight 95.3 kg (210 lb) Documented at 05/11/2024 2019             TELEMETRY: Sinus rhythm    Physical Exam:  Constitutional:       Appearance: Well-developed.   Eyes:      General: No scleral icterus.     Conjunctiva/sclera: Conjunctivae normal.      Pupils: Pupils are equal, round, and reactive to light.   HENT:      Head: Normocephalic and atraumatic.   Neck:      Vascular: No carotid bruit or JVD.   Pulmonary:      Effort: Pulmonary effort is normal.      Breath sounds: Normal breath sounds. No wheezing. No rales.   Cardiovascular:      Normal rate. Regular rhythm.   Pulses:     Intact distal pulses.   Abdominal:      General: Bowel sounds are normal.      Palpations: Abdomen is soft.   Musculoskeletal: Normal range of motion.      Cervical back: Normal range of motion and neck supple. Skin:     General: Skin is warm and dry.      Findings: No rash.   Neurological:      Mental Status: Alert.      Comments: No focal deficits          Results Review:   I reviewed the patient's new clinical results.  Lab Results (last 24 hours)       Procedure Component Value Units Date/Time    Phosphorus [[id number]]  (Abnormal) Collected: 05/16/24 0501    Specimen: Blood from Arm, Left Updated: 05/16/24 0541     Phosphorus 2.0 mg/dL     Magnesium [[id number]]  (Normal) Collected: 05/16/24 " 0501    Specimen: Blood from Arm, Left Updated: 05/16/24 0538     Magnesium 2.1 mg/dL     Comprehensive Metabolic Panel [[id number]]  (Abnormal) Collected: 05/16/24 0501    Specimen: Blood from Arm, Left Updated: 05/16/24 0538     Glucose 147 mg/dL      BUN 33 mg/dL      Creatinine 0.91 mg/dL      Sodium 137 mmol/L      Potassium 4.2 mmol/L      Chloride 96 mmol/L      CO2 32.0 mmol/L      Calcium 8.8 mg/dL      Total Protein 6.5 g/dL      Albumin 3.3 g/dL      ALT (SGPT) 10 U/L      AST (SGOT) 11 U/L      Alkaline Phosphatase 125 U/L      Total Bilirubin 0.4 mg/dL      Globulin 3.2 gm/dL      A/G Ratio 1.0 g/dL      BUN/Creatinine Ratio 36.3     Anion Gap 9.0 mmol/L      eGFR 76.1 mL/min/1.73     Narrative:      GFR Normal >60  Chronic Kidney Disease <60  Kidney Failure <15      CBC & Differential [[id number]]  (Abnormal) Collected: 05/16/24 0501    Specimen: Blood from Arm, Left Updated: 05/16/24 0517    Narrative:      The following orders were created for panel order CBC & Differential.  Procedure                               Abnormality         Status                     ---------                               -----------         ------                     CBC Auto Differential[[id number]]        Abnormal            Final result                 Please view results for these tests on the individual orders.    CBC Auto Differential [[id number]]  (Abnormal) Collected: 05/16/24 0501    Specimen: Blood from Arm, Left Updated: 05/16/24 0517     WBC 19.88 10*3/mm3      RBC 4.40 10*6/mm3      Hemoglobin 10.2 g/dL      Hematocrit 34.3 %      MCV 78.0 fL      MCH 23.2 pg      MCHC 29.7 g/dL      RDW 16.9 %      RDW-SD 46.6 fl      MPV 9.4 fL      Platelets 383 10*3/mm3      Neutrophil % 80.0 %      Lymphocyte % 10.4 %      Monocyte % 7.9 %      Eosinophil % 0.0 %      Basophil % 0.2 %      Immature Grans % 1.5 %      Neutrophils, Absolute 15.91 10*3/mm3      Lymphocytes, Absolute 2.07 10*3/mm3      Monocytes, Absolute 1.57  10*3/mm3      Eosinophils, Absolute 0.00 10*3/mm3      Basophils, Absolute 0.03 10*3/mm3      Immature Grans, Absolute 0.30 10*3/mm3      nRBC 0.0 /100 WBC     Blood Culture - Blood, Arm, Right [[id number]]  (Normal) Collected: 05/11/24 2147    Specimen: Blood from Arm, Right Updated: 05/15/24 2200     Blood Culture No growth at 4 days    Blood Culture - Blood, Arm, Left [[id number]]  (Normal) Collected: 05/11/24 2147    Specimen: Blood from Arm, Left Updated: 05/15/24 2200     Blood Culture No growth at 4 days            Imaging Results (Last 24 Hours)       ** No results found for the last 24 hours. **            EKG      I personally viewed and interpreted the patient's EKG/Telemetry data:    ECHOCARDIOGRAM:  Results for orders placed during the hospital encounter of 05/11/24    Adult Transthoracic Echo Complete w/ Color, Spectral and Contrast if Necessary Per Protocol    Interpretation Summary    Left ventricular systolic function is severely decreased. Calculated left ventricular 3D EF = 18% Left ventricular ejection fraction appears to be less than 20%.    Left ventricular diastolic function is consistent with (grade II w/high LAP) pseudonormalization.    Severely reduced right ventricular systolic function noted.    The right ventricular cavity is moderately dilated.    The left atrial cavity is moderate to severely dilated.    The right atrial cavity is moderate to severely  dilated.    Moderate tricuspid valve regurgitation is present.    Estimated right ventricular systolic pressure from tricuspid regurgitation is moderately elevated (45-55 mmHg).    Moderate to severe pulmonary hypertension is present.       STRESS MYOVIEW:       CARDIAC CATHETERIZATION:  No results found for this or any previous visit.       OTHER:         Assessment & Plan     HFrEF  Patient has congestive heart failure LV systolic dysfunction EF of 20%  Patient is not on any medicines  Patient will be started on low-dose beta-blockers  but cannot have ACE inhibitor's right now because of renal insufficiency  Patient also on IV Lasix but will monitor her renal function  Patient will have cardiac catheterization to rule out coronary artery disease  Patient is placed on hydralazine and the blood pressure and heart rate are stable and is tolerating well.     Non-STEMI  Patient had elevated troponin but could be type II MI but will need a cardiac evaluation because of her new diagnosis of heart failure  Patient is on aspirin and will check her lipid levels and also placed on statins.  Patient will have cardiac authorization performed.  Discussed with patient about procedure risks and benefits.  Patient still has high white counts but the renal function stable and will perform cardiac catheterization when she is more stable.  Nephrologist cleared the patient for cardiac catheterization and she is also feeling better and hence I will perform a cardiac intervention the morning.  Will hold the Eliquis.     History of DVT and pulmonary embolism  Patient was admitted in [facility] for the same and was on Eliquis but did not take it regularly because of lack of insurance.  She will take her Eliquis now because she has insurance.  Patient had an echocardiogram which showed severe right ventricular systolic dysfunction also.     Acute respiratory failure  Patient has shortness of breath or viral pneumonia with rhinovirus but also had recent PE with right ventricular strain and dysfunction  Patient is on oxygen and her proBNP is also elevated and is on Lasix  Patient also on antibiotics for pneumonia.  White counts are still high.     Hypertension  Patient's blood pressure is currently stable on beta-blockers and losartan  Her losartan has been stopped because of renal insufficiency and will place on hydralazine.    Renal insufficiency  Patient has acute on chronic renal insufficiency and is followed by the nephrologist.  Patient is feeling better on  Lasix but no ACE inhibitor's.    I discussed the patients findings and my recommendations with patient and nurse    Rob Garrido MD  05/16/24  12:51 EDT

## 2024-05-16 NOTE — PLAN OF CARE
Problem: Adult Inpatient Plan of Care  Goal: Plan of Care Review  Outcome: Ongoing, Progressing     Problem: Adjustment to Illness (Sepsis/Septic Shock)  Goal: Optimal Coping  Outcome: Ongoing, Progressing     Problem: Bleeding (Sepsis/Septic Shock)  Goal: Absence of Bleeding  Outcome: Ongoing, Progressing     Problem: Glycemic Control Impaired (Sepsis/Septic Shock)  Goal: Blood Glucose Level Within Desired Range  Outcome: Ongoing, Progressing     Problem: Infection Progression (Sepsis/Septic Shock)  Goal: Absence of Infection Signs and Symptoms  Outcome: Ongoing, Progressing  Intervention: Initiate Sepsis Management  Recent Flowsheet Documentation  Taken 5/16/2024 0800 by Nasreen Ulrich RN  Infection Prevention: single patient room provided  Isolation Precautions: droplet     Problem: Nutrition Impaired (Sepsis/Septic Shock)  Goal: Optimal Nutrition Intake  Outcome: Ongoing, Progressing     Problem: Skin Injury Risk Increased  Goal: Skin Health and Integrity  Outcome: Ongoing, Progressing     Problem: Fall Injury Risk  Goal: Absence of Fall and Fall-Related Injury  Outcome: Ongoing, Progressing  Intervention: Promote Injury-Free Environment  Recent Flowsheet Documentation  Taken 5/16/2024 1621 by Nasreen Ulrich RN  Safety Promotion/Fall Prevention: safety round/check completed  Taken 5/16/2024 1400 by Nasreen Ulrich RN  Safety Promotion/Fall Prevention: safety round/check completed  Taken 5/16/2024 1200 by Nasreen Ulrich RN  Safety Promotion/Fall Prevention: safety round/check completed  Taken 5/16/2024 1000 by Nasreen Ulrich RN  Safety Promotion/Fall Prevention: safety round/check completed  Taken 5/16/2024 0800 by Nasreen Ulrich RN  Safety Promotion/Fall Prevention: safety round/check completed   Goal Outcome Evaluation:   Pt relaxed in bed and independency ambulated in her room and bathroom. She had her Nicotine patch replaced due to falling off in the shower; replaced per  providers orders as well. No concerns and call light within reach.

## 2024-05-17 ENCOUNTER — APPOINTMENT (OUTPATIENT)
Dept: CT IMAGING | Facility: HOSPITAL | Age: 54
End: 2024-05-17
Payer: MEDICAID

## 2024-05-17 LAB
ALBUMIN SERPL-MCNC: 3.4 G/DL (ref 3.5–5.2)
ALBUMIN/GLOB SERPL: 1 G/DL
ALP SERPL-CCNC: 113 U/L (ref 39–117)
ALT SERPL W P-5'-P-CCNC: 13 U/L (ref 1–33)
ANION GAP SERPL CALCULATED.3IONS-SCNC: 10 MMOL/L (ref 5–15)
AST SERPL-CCNC: 14 U/L (ref 1–32)
BASOPHILS # BLD AUTO: 0.03 10*3/MM3 (ref 0–0.2)
BASOPHILS NFR BLD AUTO: 0.2 % (ref 0–1.5)
BILIRUB SERPL-MCNC: 0.4 MG/DL (ref 0–1.2)
BUN SERPL-MCNC: 34 MG/DL (ref 6–20)
BUN/CREAT SERPL: 29.1 (ref 7–25)
CALCIUM SPEC-SCNC: 9.1 MG/DL (ref 8.6–10.5)
CHLORIDE SERPL-SCNC: 96 MMOL/L (ref 98–107)
CO2 SERPL-SCNC: 31 MMOL/L (ref 22–29)
CREAT SERPL-MCNC: 1.17 MG/DL (ref 0.57–1)
DEPRECATED RDW RBC AUTO: 47.5 FL (ref 37–54)
DEPRECATED RDW RBC AUTO: 48 FL (ref 37–54)
EGFRCR SERPLBLD CKD-EPI 2021: 56.3 ML/MIN/1.73
EOSINOPHIL # BLD AUTO: 0.01 10*3/MM3 (ref 0–0.4)
EOSINOPHIL NFR BLD AUTO: 0.1 % (ref 0.3–6.2)
ERYTHROCYTE [DISTWIDTH] IN BLOOD BY AUTOMATED COUNT: 17 % (ref 12.3–15.4)
ERYTHROCYTE [DISTWIDTH] IN BLOOD BY AUTOMATED COUNT: 17.2 % (ref 12.3–15.4)
GLOBULIN UR ELPH-MCNC: 3.4 GM/DL
GLUCOSE BLDC GLUCOMTR-MCNC: 96 MG/DL (ref 70–105)
GLUCOSE SERPL-MCNC: 134 MG/DL (ref 65–99)
HCT VFR BLD AUTO: 28 % (ref 34–46.6)
HCT VFR BLD AUTO: 33.4 % (ref 34–46.6)
HGB BLD-MCNC: 8.3 G/DL (ref 12–15.9)
HGB BLD-MCNC: 9.8 G/DL (ref 12–15.9)
IMM GRANULOCYTES # BLD AUTO: 0.21 10*3/MM3 (ref 0–0.05)
IMM GRANULOCYTES NFR BLD AUTO: 1.5 % (ref 0–0.5)
LYMPHOCYTES # BLD AUTO: 1.84 10*3/MM3 (ref 0.7–3.1)
LYMPHOCYTES NFR BLD AUTO: 12.7 % (ref 19.6–45.3)
MAGNESIUM SERPL-MCNC: 1.9 MG/DL (ref 1.6–2.6)
MCH RBC QN AUTO: 22.8 PG (ref 26.6–33)
MCH RBC QN AUTO: 23.1 PG (ref 26.6–33)
MCHC RBC AUTO-ENTMCNC: 29.3 G/DL (ref 31.5–35.7)
MCHC RBC AUTO-ENTMCNC: 29.6 G/DL (ref 31.5–35.7)
MCV RBC AUTO: 77.8 FL (ref 79–97)
MCV RBC AUTO: 77.9 FL (ref 79–97)
MONOCYTES # BLD AUTO: 1.18 10*3/MM3 (ref 0.1–0.9)
MONOCYTES NFR BLD AUTO: 8.2 % (ref 5–12)
NEUTROPHILS NFR BLD AUTO: 11.2 10*3/MM3 (ref 1.7–7)
NEUTROPHILS NFR BLD AUTO: 77.3 % (ref 42.7–76)
NRBC BLD AUTO-RTO: 0 /100 WBC (ref 0–0.2)
PHOSPHATE SERPL-MCNC: 2.3 MG/DL (ref 2.5–4.5)
PLATELET # BLD AUTO: 346 10*3/MM3 (ref 140–450)
PLATELET # BLD AUTO: 351 10*3/MM3 (ref 140–450)
PMV BLD AUTO: 9.4 FL (ref 6–12)
PMV BLD AUTO: 9.5 FL (ref 6–12)
POTASSIUM SERPL-SCNC: 4.4 MMOL/L (ref 3.5–5.2)
PROT SERPL-MCNC: 6.8 G/DL (ref 6–8.5)
RBC # BLD AUTO: 3.6 10*6/MM3 (ref 3.77–5.28)
RBC # BLD AUTO: 4.29 10*6/MM3 (ref 3.77–5.28)
SODIUM SERPL-SCNC: 137 MMOL/L (ref 136–145)
WBC NRBC COR # BLD AUTO: 14.47 10*3/MM3 (ref 3.4–10.8)
WBC NRBC COR # BLD AUTO: 14.61 10*3/MM3 (ref 3.4–10.8)

## 2024-05-17 PROCEDURE — 94799 UNLISTED PULMONARY SVC/PX: CPT

## 2024-05-17 PROCEDURE — B2151ZZ FLUOROSCOPY OF LEFT HEART USING LOW OSMOLAR CONTRAST: ICD-10-PCS | Performed by: INTERNAL MEDICINE

## 2024-05-17 PROCEDURE — 93458 L HRT ARTERY/VENTRICLE ANGIO: CPT | Performed by: INTERNAL MEDICINE

## 2024-05-17 PROCEDURE — 25010000002 FENTANYL CITRATE (PF) 100 MCG/2ML SOLUTION: Performed by: INTERNAL MEDICINE

## 2024-05-17 PROCEDURE — 94761 N-INVAS EAR/PLS OXIMETRY MLT: CPT

## 2024-05-17 PROCEDURE — 25010000002 FENTANYL CITRATE (PF) 50 MCG/ML SOLUTION: Performed by: INTERNAL MEDICINE

## 2024-05-17 PROCEDURE — 82948 REAGENT STRIP/BLOOD GLUCOSE: CPT

## 2024-05-17 PROCEDURE — 85027 COMPLETE CBC AUTOMATED: CPT | Performed by: INTERNAL MEDICINE

## 2024-05-17 PROCEDURE — 74178 CT ABD&PLV WO CNTR FLWD CNTR: CPT

## 2024-05-17 PROCEDURE — 99232 SBSQ HOSP IP/OBS MODERATE 35: CPT | Performed by: INTERNAL MEDICINE

## 2024-05-17 PROCEDURE — 25010000002 LIDOCAINE 1 % SOLUTION: Performed by: INTERNAL MEDICINE

## 2024-05-17 PROCEDURE — B2111ZZ FLUOROSCOPY OF MULTIPLE CORONARY ARTERIES USING LOW OSMOLAR CONTRAST: ICD-10-PCS | Performed by: INTERNAL MEDICINE

## 2024-05-17 PROCEDURE — 84100 ASSAY OF PHOSPHORUS: CPT | Performed by: INTERNAL MEDICINE

## 2024-05-17 PROCEDURE — 25010000002 MORPHINE PER 10 MG: Performed by: INTERNAL MEDICINE

## 2024-05-17 PROCEDURE — 25510000001 IOPAMIDOL PER 1 ML: Performed by: INTERNAL MEDICINE

## 2024-05-17 PROCEDURE — 25810000003 SODIUM CHLORIDE 0.9 % SOLUTION: Performed by: INTERNAL MEDICINE

## 2024-05-17 PROCEDURE — 94664 DEMO&/EVAL PT USE INHALER: CPT

## 2024-05-17 PROCEDURE — 83735 ASSAY OF MAGNESIUM: CPT | Performed by: HOSPITALIST

## 2024-05-17 PROCEDURE — C1769 GUIDE WIRE: HCPCS | Performed by: INTERNAL MEDICINE

## 2024-05-17 PROCEDURE — 4A023N7 MEASUREMENT OF CARDIAC SAMPLING AND PRESSURE, LEFT HEART, PERCUTANEOUS APPROACH: ICD-10-PCS | Performed by: INTERNAL MEDICINE

## 2024-05-17 PROCEDURE — 80053 COMPREHEN METABOLIC PANEL: CPT | Performed by: HOSPITALIST

## 2024-05-17 PROCEDURE — 85025 COMPLETE CBC W/AUTO DIFF WBC: CPT | Performed by: HOSPITALIST

## 2024-05-17 PROCEDURE — 99152 MOD SED SAME PHYS/QHP 5/>YRS: CPT | Performed by: INTERNAL MEDICINE

## 2024-05-17 PROCEDURE — 25010000002 MIDAZOLAM PER 1 MG: Performed by: INTERNAL MEDICINE

## 2024-05-17 PROCEDURE — C1894 INTRO/SHEATH, NON-LASER: HCPCS | Performed by: INTERNAL MEDICINE

## 2024-05-17 RX ORDER — FUROSEMIDE 40 MG/1
80 TABLET ORAL
Status: DISCONTINUED | OUTPATIENT
Start: 2024-05-18 | End: 2024-05-19

## 2024-05-17 RX ORDER — LIDOCAINE HYDROCHLORIDE 10 MG/ML
INJECTION, SOLUTION INFILTRATION; PERINEURAL
Status: DISCONTINUED | OUTPATIENT
Start: 2024-05-17 | End: 2024-05-17 | Stop reason: HOSPADM

## 2024-05-17 RX ORDER — HYDROCODONE BITARTRATE AND ACETAMINOPHEN 10; 325 MG/1; MG/1
1 TABLET ORAL EVERY 6 HOURS PRN
Status: DISCONTINUED | OUTPATIENT
Start: 2024-05-17 | End: 2024-05-17

## 2024-05-17 RX ORDER — FENTANYL CITRATE 50 UG/ML
50 INJECTION, SOLUTION INTRAMUSCULAR; INTRAVENOUS EVERY 4 HOURS PRN
Status: DISCONTINUED | OUTPATIENT
Start: 2024-05-17 | End: 2024-05-20 | Stop reason: HOSPADM

## 2024-05-17 RX ORDER — HYDROCODONE BITARTRATE AND ACETAMINOPHEN 10; 325 MG/1; MG/1
1 TABLET ORAL EVERY 4 HOURS PRN
Status: DISCONTINUED | OUTPATIENT
Start: 2024-05-17 | End: 2024-05-20 | Stop reason: HOSPADM

## 2024-05-17 RX ORDER — FENTANYL CITRATE 50 UG/ML
INJECTION, SOLUTION INTRAMUSCULAR; INTRAVENOUS
Status: DISCONTINUED | OUTPATIENT
Start: 2024-05-17 | End: 2024-05-17 | Stop reason: HOSPADM

## 2024-05-17 RX ORDER — FENTANYL CITRATE 50 UG/ML
25 INJECTION, SOLUTION INTRAMUSCULAR; INTRAVENOUS
Status: DISPENSED | OUTPATIENT
Start: 2024-05-17 | End: 2024-05-19

## 2024-05-17 RX ORDER — MIDAZOLAM HYDROCHLORIDE 1 MG/ML
INJECTION INTRAMUSCULAR; INTRAVENOUS
Status: DISCONTINUED | OUTPATIENT
Start: 2024-05-17 | End: 2024-05-17 | Stop reason: HOSPADM

## 2024-05-17 RX ORDER — SODIUM CHLORIDE 9 MG/ML
250 INJECTION, SOLUTION INTRAVENOUS ONCE AS NEEDED
Status: DISCONTINUED | OUTPATIENT
Start: 2024-05-17 | End: 2024-05-20 | Stop reason: HOSPADM

## 2024-05-17 RX ORDER — MORPHINE SULFATE 2 MG/ML
2 INJECTION, SOLUTION INTRAMUSCULAR; INTRAVENOUS EVERY 4 HOURS PRN
Status: DISCONTINUED | OUTPATIENT
Start: 2024-05-17 | End: 2024-05-20 | Stop reason: HOSPADM

## 2024-05-17 RX ORDER — SODIUM CHLORIDE 9 MG/ML
INJECTION, SOLUTION INTRAVENOUS
Status: COMPLETED | OUTPATIENT
Start: 2024-05-17 | End: 2024-05-17

## 2024-05-17 RX ORDER — NITROGLYCERIN 0.4 MG/1
0.4 TABLET SUBLINGUAL
Status: DISCONTINUED | OUTPATIENT
Start: 2024-05-17 | End: 2024-05-20 | Stop reason: HOSPADM

## 2024-05-17 RX ORDER — SODIUM CHLORIDE 9 MG/ML
75 INJECTION, SOLUTION INTRAVENOUS CONTINUOUS
Status: DISCONTINUED | OUTPATIENT
Start: 2024-05-17 | End: 2024-05-17

## 2024-05-17 RX ADMIN — IOPAMIDOL 100 ML: 755 INJECTION, SOLUTION INTRAVENOUS at 15:07

## 2024-05-17 RX ADMIN — HYDRALAZINE HYDROCHLORIDE 25 MG: 25 TABLET ORAL at 20:25

## 2024-05-17 RX ADMIN — HYDROCODONE BITARTRATE AND ACETAMINOPHEN 1 TABLET: 10; 325 TABLET ORAL at 18:21

## 2024-05-17 RX ADMIN — Medication 600 MG: at 20:25

## 2024-05-17 RX ADMIN — MORPHINE SULFATE 2 MG: 2 INJECTION, SOLUTION INTRAMUSCULAR; INTRAVENOUS at 16:54

## 2024-05-17 RX ADMIN — FENTANYL CITRATE 25 MCG: 50 INJECTION, SOLUTION INTRAMUSCULAR; INTRAVENOUS at 13:29

## 2024-05-17 RX ADMIN — Medication 10 ML: at 20:26

## 2024-05-17 RX ADMIN — FENTANYL CITRATE 50 MCG: 50 INJECTION, SOLUTION INTRAMUSCULAR; INTRAVENOUS at 14:11

## 2024-05-17 RX ADMIN — HYDROCODONE BITARTRATE AND ACETAMINOPHEN 1 TABLET: 10; 325 TABLET ORAL at 13:44

## 2024-05-17 RX ADMIN — MORPHINE SULFATE 2 MG: 2 INJECTION, SOLUTION INTRAMUSCULAR; INTRAVENOUS at 12:49

## 2024-05-17 RX ADMIN — IPRATROPIUM BROMIDE AND ALBUTEROL SULFATE 3 ML: .5; 3 SOLUTION RESPIRATORY (INHALATION) at 07:57

## 2024-05-17 RX ADMIN — FENTANYL CITRATE 25 MCG: 50 INJECTION, SOLUTION INTRAMUSCULAR; INTRAVENOUS at 13:04

## 2024-05-17 RX ADMIN — METOPROLOL SUCCINATE 25 MG: 25 TABLET, FILM COATED, EXTENDED RELEASE ORAL at 20:25

## 2024-05-17 RX ADMIN — TRAMADOL HYDROCHLORIDE 50 MG: 50 TABLET, COATED ORAL at 12:41

## 2024-05-17 NOTE — PROGRESS NOTES
"NEPHROLOGY PROGRESS NOTE------[facility]    [facility]  [phone]  Albert Manzo MD      Patient Care Team:  Provider, No Known as PCP - Albert Russo MD as Consulting Physician (Nephrology)      Provider:  Albert Manzo MD  Patient Name: Jen Cruz  :  1970    SUBJECTIVE:  F/U NICCI/CKD  No chest pain, still some SOA, improving    Medication:  [Transfer Hold] Acetylcysteine, 600 mg, Oral, BID  [Held by provider] apixaban, 5 mg, Oral, Q12H  [Transfer Hold] aspirin, 81 mg, Oral, Daily  [Held by provider] furosemide, 40 mg, Intravenous, Q12H  hydrALAZINE, 25 mg, Oral, Q12H  [Transfer Hold] ipratropium-albuterol, 3 mL, Nebulization, 4x Daily - RT  [Transfer Hold] Lidocaine, 1 patch, Transdermal, Q24H  metoprolol succinate XL, 25 mg, Oral, Q12H  [Transfer Hold] nicotine, 1 patch, Transdermal, Q24H  nicotine, 1 patch, Transdermal, Once  predniSONE, 20 mg, Oral, Daily With Breakfast  [Transfer Hold] sodium chloride, 10 mL, Intravenous, Q12H           OBJECTIVE    Vital Sign Min/Max for last 24 hours  Temp  Min: 97.7 °F (36.5 °C)  Max: 98 °F (36.7 °C)   BP  Min: 128/85  Max: 154/89   Pulse  Min: 81  Max: 96   Resp  Min: 12  Max: 20   SpO2  Min: 95 %  Max: 100 %   No data recorded   No data recorded     Flowsheet Rows      Flowsheet Row First Filed Value   Admission Height 165.1 cm (65\") Documented at 2024   Admission Weight 95.3 kg (210 lb) Documented at 2024            No intake/output data recorded.  No intake/output data recorded.    Physical Exam:  General Appearance: alert, appears stated age and cooperative  Head: normocephalic, without obvious abnormality and atraumatic  Eyes: conjunctivae and sclerae normal and no icterus  Neck: supple and no JVD  Lungs: scattered rhonchi  Heart: regular rhythm & normal rate and normal S1, S2  Chest: Wall no abnormalities observed  Abdomen: normal bowel sounds and soft, " "nontender  Extremities: moves extremities well, + edema, no cyanosis and no redness  Skin: no bleeding, bruising or rash, turgor normal, color normal and no lesions noted  Neurologic: Alert, and oriented. No focal deficits    Labs:    WBC WBC   Date Value Ref Range Status   05/17/2024 14.47 (H) 3.40 - 10.80 10*3/mm3 Final   05/16/2024 19.88 (H) 3.40 - 10.80 10*3/mm3 Final   05/15/2024 16.56 (H) 3.40 - 10.80 10*3/mm3 Final      HGB Hemoglobin   Date Value Ref Range Status   05/17/2024 9.8 (L) 12.0 - 15.9 g/dL Final   05/16/2024 10.2 (L) 12.0 - 15.9 g/dL Final   05/15/2024 9.9 (L) 12.0 - 15.9 g/dL Final      HCT Hematocrit   Date Value Ref Range Status   05/17/2024 33.4 (L) 34.0 - 46.6 % Final   05/16/2024 34.3 34.0 - 46.6 % Final   05/15/2024 33.4 (L) 34.0 - 46.6 % Final      Platelets No results found for: \"LABPLAT\"   MCV MCV   Date Value Ref Range Status   05/17/2024 77.9 (L) 79.0 - 97.0 fL Final   05/16/2024 78.0 (L) 79.0 - 97.0 fL Final   05/15/2024 78.0 (L) 79.0 - 97.0 fL Final          Sodium Sodium   Date Value Ref Range Status   05/17/2024 137 136 - 145 mmol/L Final   05/16/2024 137 136 - 145 mmol/L Final   05/15/2024 133 (L) 136 - 145 mmol/L Final      Potassium Potassium   Date Value Ref Range Status   05/17/2024 4.4 3.5 - 5.2 mmol/L Final   05/16/2024 4.2 3.5 - 5.2 mmol/L Final   05/15/2024 4.8 3.5 - 5.2 mmol/L Final      Chloride Chloride   Date Value Ref Range Status   05/17/2024 96 (L) 98 - 107 mmol/L Final   05/16/2024 96 (L) 98 - 107 mmol/L Final   05/15/2024 92 (L) 98 - 107 mmol/L Final      CO2 CO2   Date Value Ref Range Status   05/17/2024 31.0 (H) 22.0 - 29.0 mmol/L Final   05/16/2024 32.0 (H) 22.0 - 29.0 mmol/L Final   05/15/2024 31.0 (H) 22.0 - 29.0 mmol/L Final      BUN BUN   Date Value Ref Range Status   05/17/2024 34 (H) 6 - 20 mg/dL Final   05/16/2024 33 (H) 6 - 20 mg/dL Final   05/15/2024 39 (H) 6 - 20 mg/dL Final      Creatinine Creatinine   Date Value Ref Range Status   05/17/2024 1.17 " "(H) 0.57 - 1.00 mg/dL Final   05/16/2024 0.91 0.57 - 1.00 mg/dL Final   05/15/2024 1.19 (H) 0.57 - 1.00 mg/dL Final      Calcium Calcium   Date Value Ref Range Status   05/17/2024 9.1 8.6 - 10.5 mg/dL Final   05/16/2024 8.8 8.6 - 10.5 mg/dL Final   05/15/2024 9.3 8.6 - 10.5 mg/dL Final      PO4 No components found for: \"PO4\"   Albumin Albumin   Date Value Ref Range Status   05/17/2024 3.4 (L) 3.5 - 5.2 g/dL Final   05/16/2024 3.3 (L) 3.5 - 5.2 g/dL Final   05/15/2024 3.6 3.5 - 5.2 g/dL Final      Magnesium Magnesium   Date Value Ref Range Status   05/17/2024 1.9 1.6 - 2.6 mg/dL Final   05/16/2024 2.1 1.6 - 2.6 mg/dL Final   05/15/2024 2.1 1.6 - 2.6 mg/dL Final      Uric Acid No components found for: \"URIC ACID\"     Imaging Results (Last 72 Hours)       ** No results found for the last 72 hours. **            Results for orders placed during the hospital encounter of 05/11/24    XR Chest 1 View    Narrative  XR CHEST 1 VW    Date of Exam: 5/11/2024 9:01 PM EDT    Indication: chest pain    Comparison: None available.    Findings:  The heart appears enlarged. Patchy airspace disease is seen within the bilateral lower lobes in the perihilar region. Probable small bilateral pleural effusions are present. Chronic appearing interstitial changes are present with indistinctness of the  pulmonary vasculature.. No acute osseous abnormality identified.    Impression  Impression:  Bibasilar pneumonia with probable mild underlying pulmonary edema pattern and small bilateral pleural effusions. There is mild cardiomegaly..      Electronically Signed: Sarah Good MD  5/11/2024 9:03 PM EDT  Workstation ID: [id number]      Results for orders placed during the hospital encounter of 05/11/24    Doppler Arterial Multi Level Lower Extremity - Bilateral CAR    Interpretation Summary    Right Conclusion: The right HALIMA is mildly reduced. Mild digital insufficiency.    Left Conclusion: The left HALIMA is moderately reduced. Mild digital " insufficiency.        ASSESSMENT / PLAN      Acute on chronic HFrEF (heart failure with reduced ejection fraction)    CHF exacerbation    Rhinovirus infection    NICCI-likely ATN in setting of contrast exposure and or diuresis.  Renal dose all without obstructive uropathy.  Hypertension-hold losartan for now  Congestive heart failure-EF 20%  NSTEMI-Per cardiology.    Rhinovirus infection  History of hypercoagulable state  Hyponatremia--with excess volunte. Diurese. Fluid restrict.     CR mostly stable  Holding Lasix  Heart cath today  Resume Lasix this PM  Labs in am        Albert Manzo MD  [facility]  [phone]  05/17/24  12:06 EDT

## 2024-05-17 NOTE — NURSING NOTE
Dr. Garrido called and came to see pt as I was holding pressure on groin, due to hematoma that quickly developed after first groin check after hemostasis at 1210... 45 minutes of manual pressure placed, along with 10lb sandbag.  Hematoma expansion visible as soon as pressure taken off, MD aware, changed bed request to send pt to PCU.  Pt in excruciating pain.  Has been given tramadol initially for back pain before hematoma developed, 1 dose of 2mg of morphine IV, 2 doses of fentanyl 25mcg each, and norco 10mg po.  Pt still c/o pain 10/10.  MD aware.  Second Nurse Mallory Joseph RN

## 2024-05-17 NOTE — PROGRESS NOTES
Hospitalist Service   Daily Progress Note      Patient Name: Jen Cruz  : 1970  MRN: [id number]  Primary Care Physician:  Provider, No Known  Date of admission: 2024      Subjective      Chief Complaint: Shortness of breath    Patient seen and examined this morning.  Continues to feel better, breathing at baseline now.  Going for [facility] later today.  Will monitor creatinine again tomorrow.  No other complaints.    Pertinent positives as noted in HPI/subjective.  All other systems were reviewed and are negative.      Objective      Vitals:   Temp:  [97.7 °F (36.5 °C)-98 °F (36.7 °C)] 97.7 °F (36.5 °C)  Heart Rate:  [83-96] 86  Resp:  [14-20] 20  BP: (133-154)/(82-91) 146/91  Flow (L/min):  [2] 2    Physical Exam:    General: Awake, alert, NAD  Eyes: PERRL, EOMI, conjunctivae are clear  Cardiovascular: Regular rate and rhythm, no murmurs  Respiratory: Clear to auscultation bilaterally, no rhonchi or wheezing, unlabored breathing  Abdomen: Soft, nontender, positive bowel sounds, no guarding  Neurologic: A&O, CN grossly intact, moves all extremities spontaneously  Musculoskeletal: Normal range of motion, no other gross deformities  Skin: Warm, dry         Result Review    Result Review:  I have personally reviewed the results from the time of this admission to 2024 09:40 EDT and agree with these findings:  [x]  Laboratory  []  Microbiology  []  Radiology  []  EKG/Telemetry   []  Cardiology/Vascular   []  Pathology  []  Old records  []  Other:          Assessment & Plan      Brief Patient Summary:  Jen Cruz is a 53 y.o. female with past medical history of hypertension, DVT/PE who presented to the ED with complaints of shortness of breath. She had echo showing severe LV dysfunction with EF around 20%. CT chest PE protocol was done did not show any pulmonary embolism. Viral panel was positive for rhinovirus.  CT also consistent with bibasilar pneumonia.  Cardiology consult for new onset CHF.  Creatinine  started to increase, nephrology consulted.      Acetylcysteine, 600 mg, Oral, BID  [Held by provider] apixaban, 5 mg, Oral, Q12H  aspirin, 81 mg, Oral, Daily  [Held by provider] furosemide, 40 mg, Intravenous, Q12H  hydrALAZINE, 25 mg, Oral, Q12H  ipratropium-albuterol, 3 mL, Nebulization, 4x Daily - RT  Lidocaine, 1 patch, Transdermal, Q24H  metoprolol succinate XL, 25 mg, Oral, Q12H  nicotine, 1 patch, Transdermal, Q24H  nicotine, 1 patch, Transdermal, Once  predniSONE, 20 mg, Oral, Daily With Breakfast  sodium chloride, 10 mL, Intravenous, Q12H             I have utilized all available, immediate resources to obtain, update, or review the patient's current medications including all prescriptions, over-the-counter products, herbals, cannabis/cannabidiol products, and vitamin.mineral/dietary (nutritional) supplements.    Active Hospital Problems:  Active Hospital Problems    Diagnosis     **Acute on chronic HFrEF (heart failure with reduced ejection fraction)     Rhinovirus infection     CHF exacerbation        Assessment/Plan:     Dyspnea multifactorial from bibasilar bacterial pneumonia, unspecified bacteria  Acute COPD exacerbation  Viral URI with rhinovirus  -CTA chest negative for PE but does show bilateral bibasilar pneumonia  -Finished up antibiotics with IV ceftriaxone and azithromycin  -RVP positive for rhinovirus  -Continue bronchodilators and finished steroids on 5/17  -Weaned down to room air and doing well    New onset acute HFrEF  NSTEMI  -Echo this admission shows severe LV systolic dysfunction with EF 20%  -Troponin elevation noted, could be type II MI per cardiology  -Needs LHC performed, nephrology has cleared patient for LHC, plan for LHC today per cardiology  -Continue diuresis with Lasix, monitor I's and O's  -Continue GDMT as per cardiology  -Cardiology following    Leukocytosis  -Reactive from steroids, patient clinically improving  -Finish up antibiotics as  above    NICCI  Hyponatremia  -Likely ATN in setting of contra exposure and diuresis per nephrology  -Creatinine slowly improving  -Avoid ACE inhibitor or ARB for now  -Monitor sodium with diuresis  -Nephrology following    Hypertension  -Continue on metoprolol and hydralazine  -Monitor blood pressure, adjust as needed    History of DVT/PE  -Patient previously on Eliquis however was not taking it due to lack of insurance but now has insurance and willing to take Eliquis  -CTA chest negative for PE  -Continue Eliquis    CODE STATUS:    Code Status (Patient has no pulse and is not breathing): CPR (Attempt to Resuscitate)  Medical Interventions (Patient has pulse or is breathing): Full Support      Disposition: Likely discharge in next 24 to 48 hours pending cath    Electronically signed by Yeimy Ortiz DO, 05/17/24, 09:40 EDT.  [facility] Hospitalist Team      Part of this note may be an electronic transcription/translation of spoken language to printed text using the Dragon Dictation System.

## 2024-05-17 NOTE — TREATMENT PLAN
Addendum:    Patient had cardiac catheterization without any complications but she had heavily calcified vessels and also she did not have good peripheral artery pulses.  Patient had normal coronaries.  Patient was then transferred to the [facility] and the sheath was pulled uneventfully but later she had developed some hematoma below the inguinal crease and the nurses and myself held pressure for some time.  Later again she started having significant swelling around the groin with pain and hence a FemoStop was placed.  I ordered a CT of the abdomen pelvis and also a CBC.  I also put in a consult for vascular in case she has any abnormalities.  Vital signs are very stable.

## 2024-05-17 NOTE — CONSULTS
Heart Failure Program  Nurse Navigator  Discharge Planning: Follow-up Note    Patient Name:Jen Cruz  :1970  Current Admission Date: 2024       Last 3 Weights:  Wt Readings from Last 3 Encounters:   24 91.2 kg (201 lb)       Intake and Output totals: No intake/output data recorded.  No intake/output data recorded.          Patient Assessment:   Pt lying in bed, resp even and unlabored, edema slight improved, legs 2+ edema bilaterally with some skin retraction.       Patient Education:   Review HF education and importance of follow-up.    Review HF Education provided to patient:  yes-Symptoms worsening  yes-Prescribed medications  yes-HF self-care  yes-Follow-up Appointments       Acceptance of learning: acceptance, cooperative    Heart Failure education interactive teaching session time: 15 minutes      Identified needs/barriers:   Volume status, pending heart cath, GDMT - toprol xl. Needs 7 ay follow-up and cardiology follow-up    Intervention follow-up:  Education, HF clinic apt made per pt request.

## 2024-05-17 NOTE — PROGRESS NOTES
CARDIOLOGY PROGRESS NOTE:    Jen Cruz  53 y.o.  female  1970  [id number]      Referring Provider: Hospitalist    Reason for follow-up: Shortness of breath and HFrEF     Patient Care Team:  Provider, No Known as PCP - Albert Russo MD as Consulting Physician (Nephrology)    Subjective .  Doing well without any symptoms today    Objective lying in bed with mild respiratory distress     Review of Systems   Constitutional: Negative for fever and malaise/fatigue.   HENT:  Negative for ear pain and nosebleeds.    Eyes:  Negative for blurred vision and double vision.   Cardiovascular:  Negative for chest pain, dyspnea on exertion and palpitations.   Respiratory:  Negative for cough and shortness of breath.    Skin:  Negative for rash.   Musculoskeletal:  Negative for joint pain.   Gastrointestinal:  Negative for abdominal pain, nausea and vomiting.   Neurological:  Negative for focal weakness and headaches.   Psychiatric/Behavioral:  Negative for depression. The patient is not nervous/anxious.    All other systems reviewed and are negative.      Allergies: Heparin and Wasp venom    Scheduled Meds:Acetylcysteine, 600 mg, Oral, BID  aspirin, 81 mg, Oral, Daily  [START ON 5/18/2024] furosemide, 80 mg, Oral, BID  hydrALAZINE, 25 mg, Oral, Q12H  ipratropium-albuterol, 3 mL, Nebulization, 4x Daily - RT  Lidocaine, 1 patch, Transdermal, Q24H  metoprolol succinate XL, 25 mg, Oral, Q12H  nicotine, 1 patch, Transdermal, Q24H  nicotine, 1 patch, Transdermal, Once  predniSONE, 20 mg, Oral, Daily With Breakfast  sodium chloride, 10 mL, Intravenous, Q12H      Continuous Infusions:   PRN Meds:.  acetaminophen    atropine    benzonatate    senna-docusate sodium **AND** polyethylene glycol **AND** bisacodyl **AND** bisacodyl    Calcium Replacement - Follow Nurse / BPA Driven Protocol    fentaNYL citrate (PF)    fentaNYL citrate (PF)    HYDROcodone-acetaminophen    hydrOXYzine    ipratropium-albuterol    Magnesium Low  "Dose Replacement - Follow Nurse / BPA Driven Protocol    Morphine    naloxone    nitroglycerin    Phosphorus Replacement - Follow Nurse / BPA Driven Protocol    Potassium Replacement - Follow Nurse / BPA Driven Protocol    [COMPLETED] Insert Peripheral IV **AND** sodium chloride    sodium chloride    sodium chloride    sodium chloride    traMADol        VITAL SIGNS  Vitals:    05/17/24 1335 05/17/24 1340 05/17/24 1345 05/17/24 1350   BP: 131/88 141/94 114/68 135/78   BP Location:       Patient Position:       Pulse: 74 78 73 76   Resp:       Temp:       TempSrc:       SpO2: 92% 93% 96% 93%   Weight:       Height:           Flowsheet Rows      Flowsheet Row First Filed Value   Admission Height 165.1 cm (65\") Documented at 05/11/2024 2019   Admission Weight 95.3 kg (210 lb) Documented at 05/11/2024 2019             TELEMETRY: Sinus rhythm    Physical Exam:  Constitutional:       Appearance: Well-developed.   Eyes:      General: No scleral icterus.     Conjunctiva/sclera: Conjunctivae normal.      Pupils: Pupils are equal, round, and reactive to light.   HENT:      Head: Normocephalic and atraumatic.   Neck:      Vascular: No carotid bruit or JVD.   Pulmonary:      Effort: Pulmonary effort is normal.      Breath sounds: Normal breath sounds. No wheezing. No rales.   Cardiovascular:      Normal rate. Regular rhythm.   Pulses:     Intact distal pulses.   Abdominal:      General: Bowel sounds are normal.      Palpations: Abdomen is soft.   Musculoskeletal: Normal range of motion.      Cervical back: Normal range of motion and neck supple. Skin:     General: Skin is warm and dry.      Findings: No rash.   Neurological:      Mental Status: Alert.      Comments: No focal deficits          Results Review:   I reviewed the patient's new clinical results.  Lab Results (last 24 hours)       Procedure Component Value Units Date/Time    CBC (No Diff) [[id number]]  (Abnormal) Collected: 05/17/24 1429    Specimen: Blood Updated: " 05/17/24 1439     WBC 14.61 10*3/mm3      RBC 3.60 10*6/mm3      Hemoglobin 8.3 g/dL      Hematocrit 28.0 %      MCV 77.8 fL      MCH 23.1 pg      MCHC 29.6 g/dL      RDW 17.0 %      RDW-SD 48.0 fl      MPV 9.5 fL      Platelets 351 10*3/mm3     POC Glucose Once [[id number]]  (Normal) Collected: 05/17/24 0907    Specimen: Blood Updated: 05/17/24 0908     Glucose 96 mg/dL      Comment: Serial Number: [id number]:  [id number]       Magnesium [[id number]]  (Normal) Collected: 05/17/24 0100    Specimen: Blood from Arm, Left Updated: 05/17/24 0151     Magnesium 1.9 mg/dL     Comprehensive Metabolic Panel [[id number]]  (Abnormal) Collected: 05/17/24 0100    Specimen: Blood from Arm, Left Updated: 05/17/24 0151     Glucose 134 mg/dL      BUN 34 mg/dL      Creatinine 1.17 mg/dL      Sodium 137 mmol/L      Potassium 4.4 mmol/L      Chloride 96 mmol/L      CO2 31.0 mmol/L      Calcium 9.1 mg/dL      Total Protein 6.8 g/dL      Albumin 3.4 g/dL      ALT (SGPT) 13 U/L      AST (SGOT) 14 U/L      Alkaline Phosphatase 113 U/L      Total Bilirubin 0.4 mg/dL      Globulin 3.4 gm/dL      A/G Ratio 1.0 g/dL      BUN/Creatinine Ratio 29.1     Anion Gap 10.0 mmol/L      eGFR 56.3 mL/min/1.73     Narrative:      GFR Normal >60  Chronic Kidney Disease <60  Kidney Failure <15      Phosphorus [[id number]]  (Abnormal) Collected: 05/17/24 0100    Specimen: Blood from Arm, Left Updated: 05/17/24 0151     Phosphorus 2.3 mg/dL     CBC & Differential [[id number]]  (Abnormal) Collected: 05/17/24 0100    Specimen: Blood from Arm, Left Updated: 05/17/24 0119    Narrative:      The following orders were created for panel order CBC & Differential.  Procedure                               Abnormality         Status                     ---------                               -----------         ------                     CBC Auto Differential[[id number]]        Abnormal            Final result                 Please view results for these tests on the  individual orders.    CBC Auto Differential [[id number]]  (Abnormal) Collected: 05/17/24 0100    Specimen: Blood from Arm, Left Updated: 05/17/24 0119     WBC 14.47 10*3/mm3      RBC 4.29 10*6/mm3      Hemoglobin 9.8 g/dL      Hematocrit 33.4 %      MCV 77.9 fL      MCH 22.8 pg      MCHC 29.3 g/dL      RDW 17.2 %      RDW-SD 47.5 fl      MPV 9.4 fL      Platelets 346 10*3/mm3      Neutrophil % 77.3 %      Lymphocyte % 12.7 %      Monocyte % 8.2 %      Eosinophil % 0.1 %      Basophil % 0.2 %      Immature Grans % 1.5 %      Neutrophils, Absolute 11.20 10*3/mm3      Lymphocytes, Absolute 1.84 10*3/mm3      Monocytes, Absolute 1.18 10*3/mm3      Eosinophils, Absolute 0.01 10*3/mm3      Basophils, Absolute 0.03 10*3/mm3      Immature Grans, Absolute 0.21 10*3/mm3      nRBC 0.0 /100 WBC     Blood Culture - Blood, Arm, Right [[id number]]  (Normal) Collected: 05/11/24 2147    Specimen: Blood from Arm, Right Updated: 05/16/24 2200     Blood Culture No growth at 5 days    Blood Culture - Blood, Arm, Left [[id number]]  (Normal) Collected: 05/11/24 2147    Specimen: Blood from Arm, Left Updated: 05/16/24 2200     Blood Culture No growth at 5 days    ToxAssure Flex 14, Urine - Urine, Clean Catch [[id number]] Collected: 05/13/24 0022    Specimen: Urine, Clean Catch Updated: 05/16/24 1613     Report Summary FINAL     Comment: ====================================================================  Amphetamines, MS, Ur RFX  Opiate Class, MS, Ur RFX  Oxycodone Class, MS, Ur RFX  ToxAssure Flex 14, Urine  ====================================================================  Test                             Result       Flag       Units  Drug Present    Methamphetamine                4676                    ng/mg creat    Amphetamine                    757                     ng/mg creat     Sources of methamphetamine include illicit sources, as a     scheduled prescription medication, as a metabolite of some     prescription drugs, or use  of an l-methamphetamine inhaler.     Amphetamine is an expected metabolite of methamphetamine.     Amphetamine is also available as a schedule II prescription drug.    Lorazepam                      176                     ng/mg creat     Source of lorazepam is a scheduled prescription medication.    Morphine                       557                     ng/mg creat     Potential sources of morphine include administration of codeine     or morphine, use of heroin, or ingestion of poppy seeds.    Oxycodone                      3505                    ng/mg creat    Oxymorphone                    490                     ng/mg creat    Noroxycodone                   1752                    ng/mg creat     Sources of oxycodone include scheduled prescription medications.     Oxymorphone and noroxycodone are expected metabolites of     oxycodone. Oxymorphone is also available as a scheduled     prescription medication.  ====================================================================  Test                      Result    Flag   Units      Ref Range    Creatinine              21               mg/dL      >=20  ====================================================================  Declared Medications:   Medication list was not provided.  ====================================================================  For clinical consultation, please call [phone].  ====================================================================        CREATININE 21 mg/dL      Comment: REFERENCE RANGE: Ref Range>=20        Amphetamines, IA Comment ng/mL      Comment: Further testing indicated        Benzodiazepines +POSITIVE+     Diazepam Urine, Qualitative Not Detected ng/mg creat      Desmethyldiazepam Not Detected ng/mg creat      Oxazepam, urine Not Detected ng/mg creat      Temazepam Not Detected ng/mg creat      Comment: Expected metabolism of benzodiazepine class drugs:   Parent Drug       Detected Metabolites   -----------        --------------------   Diazepam:         Desmethyldiazepam, Temazepam, Oxazepam   Chlordiazepoxide: Desmethyldiazepam, Oxazepam   Clorazepate:      Desmethyldiazepam, Oxazepam   Halazepam:        Desmethyldiazepam, Oxazepam   Temazepam:        Oxazepam   Oxazepam:         None        Alprazolam Urine, Conf Not Detected ng/mg creat      Alpha-hydroxyalprazolam, Urine Not Detected ng/mg creat      Desalkylflurazepam, Urine Not Detected ng/mg creat      Lorazepam, Urine 176 ng/mg creat      Alpha-hydroxytriazolam, Urine Not Detected ng/mg creat      Clonazepam Not Detected ng/mg creat      7- AMINOCLONAZEPAM Not Detected ng/mg creat      Midazolam, Urine Not Detected ng/mg creat      Alpha-hydroxymidazolam, Urine Not Detected ng/mg creat      Flunitrazepam Not Detected ng/mg creat      DESMETHYLFLUNITRAZEPAM Not Detected ng/mg creat      COCAINE / METABOLITE, IA Negative ng/mL      Ethyl Alcohol, Enz Negative g/dL      6-Acetylmorphine IA Negative ng/mL      Opiate Class IA Comment ng/mL      Comment: Further testing indicated        Oxycodone Class IA Comment ng/mL      Comment: Further testing indicated        Methadone MTB IA Negative ng/mL      METHADONE, IA Negative ng/mL      Buprenorphine, Urine Negative     Buprenorphine, Urine Not Detected ng/mg creat      Norbuprenorphine Not Detected ng/mg creat      Fentanyl Negative     Fentanyl, Urine Not Detected ng/mg creat      Norfentanyl Urine Not Detected ng/mg creat      Tapentadol, IA Negative ng/mL      TRAMADOL, IA Negative ng/mL      Barbiturates, IA Negative ng/mL      PHENCYCLIDINE, IA Negative ng/mL     Narrative:      Performed at:  [facility] - [facility]  [address] [place] [address]  [id number]  : Lindsay Don [facility], Phone:  [phone]    Opiate Class, MS, Ur RFX - Urine, Clean Catch [[id number]] Collected: 05/13/24 0022    Specimen: Urine, Clean Catch Updated: 05/16/24 1613     Opiate Class +POSITIVE+     Codeine Not Detected ng/mg  creat      Morphine 557 ng/mg creat      normorphine Not Detected ng/mg creat      Norcodeine Ur Not Detected ng/mg creat      Hydrocodone Not Detected ng/mg creat      Hydromorphone Not Detected ng/mg creat      Dihydrocodeine Not Detected ng/mg creat      Norhydrocodone Not Detected ng/mg creat      Comment: Expected metabolism of opiate class drugs:  Parent Drug       Detected Metabolites   -----------       --------------------   Codeine:          Major:  Morphine, Norcodeine                     Minor:  Hydrocodone, Hydromorphone,                             Dihydrocodeine, Norhydrocodone,                             Normorphine   Morphine:         Major:  Normorphine                     Minor:  Hydromorphone   Hydrocodone:      Hydromorphone, Dihydrocodeine,                     Norhydrocodone   Hydromorphone:    None   Dihydrocodeine:   None   Heroin:           6-Acetylmorphine (if included),                     Morphine, Normorphine                     Codeine, in small amounts in comparison                      to morphine, is often detected when                      heroin is the source drug.       Narrative:      Performed at:  [facility] - [facility]  [address] [place] [address]  [id number]  : Lindsay Don [facility], Phone:  [phone]    Amphetamines, MS, Ur RFX - Urine, Clean Catch [[id number]] Collected: 05/13/24 0022    Specimen: Urine, Clean Catch Updated: 05/16/24 1613     Amphetamines Confirmation +POSITIVE+     METHAMPHETAMINE 4676 ng/mg creat      AMPHETAMINE 757 ng/mg creat      MDMA-Ecstasy Not Detected ng/mg creat      MDA Not Detected ng/mg creat     Narrative:      Performed at:  [facility] - [facility]  [address] [place] [address]  [id number]  : Lindsay Don [facility], Phone:  [phone]    Oxycodone Class, MS, Ur RFX - Urine, Clean Catch [[id number]] Collected: 05/13/24 0022    Specimen: Urine, Clean Catch Updated: 05/16/24 1613     Oxycodone Class  Confirmation +POSITIVE+     Oxycodone 3505 ng/mg creat      Oxymorphone 490 ng/mg creat      Noroxycodone 1752 ng/mg creat      Noroxymorphone Not Detected ng/mg creat      Comment: Expected metabolism of oxycodone class drugs:   Parent Drug       Detected Metabolites   -----------       --------------------   Oxycodone:        Oxymorphone, Noroxycodone, Noroxymorphone   Oxymorphone:      Noroxymorphone       Narrative:      Performed at:  01 - [facility]  [address] [place] [address]  [id number]  : Lindsay Don [facility], Phone:  [phone]            Imaging Results (Last 24 Hours)       ** No results found for the last 24 hours. **            EKG      I personally viewed and interpreted the patient's EKG/Telemetry data:    ECHOCARDIOGRAM:  Results for orders placed during the hospital encounter of 05/11/24    Adult Transthoracic Echo Complete w/ Color, Spectral and Contrast if Necessary Per Protocol    Interpretation Summary    Left ventricular systolic function is severely decreased. Calculated left ventricular 3D EF = 18% Left ventricular ejection fraction appears to be less than 20%.    Left ventricular diastolic function is consistent with (grade II w/high LAP) pseudonormalization.    Severely reduced right ventricular systolic function noted.    The right ventricular cavity is moderately dilated.    The left atrial cavity is moderate to severely dilated.    The right atrial cavity is moderate to severely  dilated.    Moderate tricuspid valve regurgitation is present.    Estimated right ventricular systolic pressure from tricuspid regurgitation is moderately elevated (45-55 mmHg).    Moderate to severe pulmonary hypertension is present.       STRESS MYOVIEW:       CARDIAC CATHETERIZATION:  No results found for this or any previous visit.       OTHER:         Assessment & Plan     HFrEF  Patient has congestive heart failure LV systolic dysfunction EF of 20%  Patient is not on any  medicines  Patient will be started on low-dose beta-blockers but cannot have ACE inhibitor's right now because of renal insufficiency  Patient also on IV Lasix but will monitor her renal function  Patient will have cardiac catheterization to rule out coronary artery disease  Patient is placed on hydralazine and the blood pressure and heart rate are stable and is tolerating well.  Patient had a cardiac catheterization which showed normal coronaries but had significant LV dysfunction     Non-STEMI  Patient had elevated troponin but could be type II MI but will need a cardiac evaluation because of her new diagnosis of heart failure  Patient is on aspirin and will check her lipid levels and also placed on statins.  Patient will have cardiac authorization performed.  Discussed with patient about procedure risks and benefits.  Patient still has high white counts but the renal function stable and will perform cardiac catheterization when she is more stable.  Nephrologist cleared the patient for cardiac catheterization and she is also feeling better and hence I will perform a cardiac intervention the morning.  Will hold the Eliquis.  Patient had cardiac catheterization which showed normal coronaries but had significant LV dysfunction  Continue medical therapy.     History of DVT and pulmonary embolism  Patient was admitted in [facility] for the same and was on Eliquis but did not take it regularly because of lack of insurance.  She will take her Eliquis now because she has insurance.  Patient had an echocardiogram which showed severe right ventricular systolic dysfunction also.     Acute respiratory failure  Patient has shortness of breath or viral pneumonia with rhinovirus but also had recent PE with right ventricular strain and dysfunction  Patient is on oxygen and her proBNP is also elevated and is on Lasix  Patient also on antibiotics for pneumonia.  White counts are still high.     Hypertension  Patient's blood  pressure is currently stable on beta-blockers and losartan  Her losartan has been stopped because of renal insufficiency and will place on hydralazine.    Renal insufficiency  Patient has acute on chronic renal insufficiency and is followed by the nephrologist.  Patient is feeling better on Lasix but no ACE inhibitor's.  Patient's renal function is normal    I discussed the patients findings and my recommendations with patient and nurse    Rob Garrido MD  05/17/24  14:49 EDT

## 2024-05-17 NOTE — CASE MANAGEMENT/SOCIAL WORK
Continued Stay Note  MARYLU Schwab     Patient Name: Jen Cruz  MRN: [id number]  Today's Date: 5/17/2024    Admit Date: 5/11/2024    Plan: D/c Plan: Return home with sister. Needs PCP and Cardiology appointment before d/c. Watch for home O2 needs.   Discharge Plan       Row Name 05/17/24 1444       Plan    Plan Comments DC barriers: abnormal labs, electrolyte replacement, breathing treatments, heart cath today. Nephrology, cardiology and heart failure nurse following               Adriana Mack RN      Office phone: [phone]  Office fax: [phone]

## 2024-05-18 ENCOUNTER — APPOINTMENT (OUTPATIENT)
Dept: CARDIOLOGY | Facility: HOSPITAL | Age: 54
End: 2024-05-18
Payer: MEDICAID

## 2024-05-18 PROBLEM — I97.630 POSTOPERATIVE HEMATOMA INVOLVING CIRCULATORY SYSTEM FOLLOWING CARDIAC CATHETERIZATION: Status: ACTIVE | Noted: 2024-05-18

## 2024-05-18 PROBLEM — D62 ACUTE BLOOD LOSS ANEMIA: Status: ACTIVE | Noted: 2024-05-18

## 2024-05-18 LAB
ABO GROUP BLD: NORMAL
ALBUMIN SERPL-MCNC: 2.9 G/DL (ref 3.5–5.2)
ALBUMIN/GLOB SERPL: 1.1 G/DL
ALP SERPL-CCNC: 103 U/L (ref 39–117)
ALT SERPL W P-5'-P-CCNC: 23 U/L (ref 1–33)
ANION GAP SERPL CALCULATED.3IONS-SCNC: 4 MMOL/L (ref 5–15)
AST SERPL-CCNC: 34 U/L (ref 1–32)
BASOPHILS # BLD AUTO: 0.01 10*3/MM3 (ref 0–0.2)
BASOPHILS NFR BLD AUTO: 0.1 % (ref 0–1.5)
BH CV RIGHT GROIN PSA PROCEDURE SCRIPTING LRR: 1
BH CV VAS PRELIMINARY FINDINGS SCRIPTING: 1
BILIRUB SERPL-MCNC: 0.4 MG/DL (ref 0–1.2)
BLD GP AB SCN SERPL QL: NEGATIVE
BUN SERPL-MCNC: 28 MG/DL (ref 6–20)
BUN/CREAT SERPL: 28.6 (ref 7–25)
CALCIUM SPEC-SCNC: 8.4 MG/DL (ref 8.6–10.5)
CHLORIDE SERPL-SCNC: 98 MMOL/L (ref 98–107)
CO2 SERPL-SCNC: 33 MMOL/L (ref 22–29)
CREAT SERPL-MCNC: 0.98 MG/DL (ref 0.57–1)
DEPRECATED RDW RBC AUTO: 48.6 FL (ref 37–54)
EGFRCR SERPLBLD CKD-EPI 2021: 69.6 ML/MIN/1.73
EOSINOPHIL # BLD AUTO: 0.12 10*3/MM3 (ref 0–0.4)
EOSINOPHIL NFR BLD AUTO: 0.8 % (ref 0.3–6.2)
ERYTHROCYTE [DISTWIDTH] IN BLOOD BY AUTOMATED COUNT: 17.2 % (ref 12.3–15.4)
GLOBULIN UR ELPH-MCNC: 2.6 GM/DL
GLUCOSE SERPL-MCNC: 118 MG/DL (ref 65–99)
HCT VFR BLD AUTO: 23.7 % (ref 34–46.6)
HCT VFR BLD AUTO: 24.5 % (ref 34–46.6)
HGB BLD-MCNC: 7 G/DL (ref 12–15.9)
HGB BLD-MCNC: 7.4 G/DL (ref 12–15.9)
IMM GRANULOCYTES # BLD AUTO: 0.2 10*3/MM3 (ref 0–0.05)
IMM GRANULOCYTES NFR BLD AUTO: 1.4 % (ref 0–0.5)
LYMPHOCYTES # BLD AUTO: 2.35 10*3/MM3 (ref 0.7–3.1)
LYMPHOCYTES NFR BLD AUTO: 16.1 % (ref 19.6–45.3)
MAGNESIUM SERPL-MCNC: 2 MG/DL (ref 1.6–2.6)
MCH RBC QN AUTO: 23.1 PG (ref 26.6–33)
MCHC RBC AUTO-ENTMCNC: 29.5 G/DL (ref 31.5–35.7)
MCV RBC AUTO: 78.2 FL (ref 79–97)
MONOCYTES # BLD AUTO: 1.11 10*3/MM3 (ref 0.1–0.9)
MONOCYTES NFR BLD AUTO: 7.6 % (ref 5–12)
NEUTROPHILS NFR BLD AUTO: 10.82 10*3/MM3 (ref 1.7–7)
NEUTROPHILS NFR BLD AUTO: 74 % (ref 42.7–76)
NRBC BLD AUTO-RTO: 0 /100 WBC (ref 0–0.2)
PHOSPHATE SERPL-MCNC: 3.5 MG/DL (ref 2.5–4.5)
PLATELET # BLD AUTO: 249 10*3/MM3 (ref 140–450)
PMV BLD AUTO: 9.6 FL (ref 6–12)
POTASSIUM SERPL-SCNC: 4.7 MMOL/L (ref 3.5–5.2)
PROT SERPL-MCNC: 5.5 G/DL (ref 6–8.5)
PROX SFA PSV RIGHT: 82 CM/SEC
RBC # BLD AUTO: 3.03 10*6/MM3 (ref 3.77–5.28)
RH BLD: POSITIVE
RIGHT GROIN CFA SYS: 179 CM/SEC
SODIUM SERPL-SCNC: 135 MMOL/L (ref 136–145)
T&S EXPIRATION DATE: NORMAL
WBC NRBC COR # BLD AUTO: 14.61 10*3/MM3 (ref 3.4–10.8)

## 2024-05-18 PROCEDURE — 85018 HEMOGLOBIN: CPT | Performed by: INTERNAL MEDICINE

## 2024-05-18 PROCEDURE — 86900 BLOOD TYPING SEROLOGIC ABO: CPT

## 2024-05-18 PROCEDURE — 93926 LOWER EXTREMITY STUDY: CPT

## 2024-05-18 PROCEDURE — 94799 UNLISTED PULMONARY SVC/PX: CPT

## 2024-05-18 PROCEDURE — 94761 N-INVAS EAR/PLS OXIMETRY MLT: CPT

## 2024-05-18 PROCEDURE — 86901 BLOOD TYPING SEROLOGIC RH(D): CPT

## 2024-05-18 PROCEDURE — 93926 LOWER EXTREMITY STUDY: CPT | Performed by: SURGERY

## 2024-05-18 PROCEDURE — 25010000002 MORPHINE PER 10 MG: Performed by: INTERNAL MEDICINE

## 2024-05-18 PROCEDURE — 85014 HEMATOCRIT: CPT | Performed by: INTERNAL MEDICINE

## 2024-05-18 PROCEDURE — 80053 COMPREHEN METABOLIC PANEL: CPT | Performed by: INTERNAL MEDICINE

## 2024-05-18 PROCEDURE — 25010000002 FENTANYL CITRATE (PF) 50 MCG/ML SOLUTION: Performed by: INTERNAL MEDICINE

## 2024-05-18 PROCEDURE — 84100 ASSAY OF PHOSPHORUS: CPT | Performed by: INTERNAL MEDICINE

## 2024-05-18 PROCEDURE — 94664 DEMO&/EVAL PT USE INHALER: CPT

## 2024-05-18 PROCEDURE — 85025 COMPLETE CBC W/AUTO DIFF WBC: CPT | Performed by: INTERNAL MEDICINE

## 2024-05-18 PROCEDURE — 83735 ASSAY OF MAGNESIUM: CPT | Performed by: INTERNAL MEDICINE

## 2024-05-18 PROCEDURE — 99232 SBSQ HOSP IP/OBS MODERATE 35: CPT | Performed by: INTERNAL MEDICINE

## 2024-05-18 PROCEDURE — P9016 RBC LEUKOCYTES REDUCED: HCPCS

## 2024-05-18 PROCEDURE — 99232 SBSQ HOSP IP/OBS MODERATE 35: CPT | Performed by: SURGERY

## 2024-05-18 PROCEDURE — 86850 RBC ANTIBODY SCREEN: CPT

## 2024-05-18 PROCEDURE — 86923 COMPATIBILITY TEST ELECTRIC: CPT

## 2024-05-18 PROCEDURE — 36430 TRANSFUSION BLD/BLD COMPNT: CPT

## 2024-05-18 RX ADMIN — IPRATROPIUM BROMIDE AND ALBUTEROL SULFATE 3 ML: .5; 3 SOLUTION RESPIRATORY (INHALATION) at 06:42

## 2024-05-18 RX ADMIN — ASPIRIN 81 MG: 81 TABLET, COATED ORAL at 08:50

## 2024-05-18 RX ADMIN — TRAMADOL HYDROCHLORIDE 50 MG: 50 TABLET, COATED ORAL at 16:27

## 2024-05-18 RX ADMIN — TRAMADOL HYDROCHLORIDE 50 MG: 50 TABLET, COATED ORAL at 08:49

## 2024-05-18 RX ADMIN — MORPHINE SULFATE 2 MG: 2 INJECTION, SOLUTION INTRAMUSCULAR; INTRAVENOUS at 00:02

## 2024-05-18 RX ADMIN — HYDRALAZINE HYDROCHLORIDE 25 MG: 25 TABLET ORAL at 20:50

## 2024-05-18 RX ADMIN — MORPHINE SULFATE 2 MG: 2 INJECTION, SOLUTION INTRAMUSCULAR; INTRAVENOUS at 08:49

## 2024-05-18 RX ADMIN — METOPROLOL SUCCINATE 25 MG: 25 TABLET, FILM COATED, EXTENDED RELEASE ORAL at 08:50

## 2024-05-18 RX ADMIN — HYDROCODONE BITARTRATE AND ACETAMINOPHEN 1 TABLET: 10; 325 TABLET ORAL at 20:50

## 2024-05-18 RX ADMIN — Medication 1 PATCH: at 01:24

## 2024-05-18 RX ADMIN — Medication 10 ML: at 20:51

## 2024-05-18 RX ADMIN — HYDROCODONE BITARTRATE AND ACETAMINOPHEN 1 TABLET: 10; 325 TABLET ORAL at 11:34

## 2024-05-18 RX ADMIN — FUROSEMIDE 80 MG: 40 TABLET ORAL at 08:50

## 2024-05-18 RX ADMIN — Medication 1 PATCH: at 08:53

## 2024-05-18 RX ADMIN — HYDROCODONE BITARTRATE AND ACETAMINOPHEN 1 TABLET: 10; 325 TABLET ORAL at 06:09

## 2024-05-18 RX ADMIN — HYDRALAZINE HYDROCHLORIDE 25 MG: 25 TABLET ORAL at 08:50

## 2024-05-18 RX ADMIN — HYDROCODONE BITARTRATE AND ACETAMINOPHEN 1 TABLET: 10; 325 TABLET ORAL at 16:27

## 2024-05-18 RX ADMIN — FENTANYL CITRATE 25 MCG: 50 INJECTION, SOLUTION INTRAMUSCULAR; INTRAVENOUS at 10:14

## 2024-05-18 RX ADMIN — HYDROCODONE BITARTRATE AND ACETAMINOPHEN 1 TABLET: 10; 325 TABLET ORAL at 00:53

## 2024-05-18 RX ADMIN — FUROSEMIDE 80 MG: 40 TABLET ORAL at 17:41

## 2024-05-18 RX ADMIN — MORPHINE SULFATE 2 MG: 2 INJECTION, SOLUTION INTRAMUSCULAR; INTRAVENOUS at 04:33

## 2024-05-18 RX ADMIN — Medication 10 ML: at 08:53

## 2024-05-18 RX ADMIN — IPRATROPIUM BROMIDE AND ALBUTEROL SULFATE 3 ML: .5; 3 SOLUTION RESPIRATORY (INHALATION) at 19:48

## 2024-05-18 RX ADMIN — METOPROLOL SUCCINATE 25 MG: 25 TABLET, FILM COATED, EXTENDED RELEASE ORAL at 20:50

## 2024-05-18 RX ADMIN — IPRATROPIUM BROMIDE AND ALBUTEROL SULFATE 3 ML: .5; 3 SOLUTION RESPIRATORY (INHALATION) at 15:46

## 2024-05-18 RX ADMIN — LIDOCAINE 1 PATCH: 4 PATCH TOPICAL at 08:49

## 2024-05-18 NOTE — NURSING NOTE
Patient has decided to stay and Chas CN will take over primary Nursing care. Report has been given. Care on going

## 2024-05-18 NOTE — PROGRESS NOTES
Cardiology Progress Note      PATIENT IDENTIFICATION    Name: Jen Cruz  Age: 53 y.o. Sex: female : 1970  MRN: [id number]    Requesting Provider    Kevin Devlin MD     LOS: 5 days       Reason For Followup:  Heart failure reduced ejection fraction  Newly diagnosed cardiomyopathy  Non-STEMI      Subjective:    Interval History:  Seen and examined.  Chart and labs reviewed.  Events noted.  Patient became very upset earlier today due to bedrest and a leaking pure wick.  She felt unhappy with her care and had signed AMA papers.  She has since reconsidered her decision.  Vascular surgery has seen the patient and has recommended another 1 to 2 days of observation.  Patient is agreeable but would like a different nurse.  She denies any chest pain.  We discussed the importance of guideline directed medical therapy for her nonischemic cardiomyopathy.    Review of Systems:  A complete review of systems was undertaken with the pertinent cardiovascular findings listed in history of present illness and all other systems being negative.     Assessment & Plan    Impressions:  Heart failure reduced ejection fraction  Nonischemic cardiomyopathy EF 20% this admission  Non-STEMI likely secondary to demand ischemia  Hypertension    Recommendations:  Cardiac catheterization with no evidence of epicardial occlusive disease  Postprocedural bleeding/hematoma being treated with packed red cells  Vascular surgery referral  Monitor rate and rhythm  Continue with long-acting beta-blocker  Patient is on angiotensin receptor blocker at home but ideally would benefit from Entresto when/if okay with nephrology  Additionally discussed LifeVest with patient.  Will place order.  Further recommendations as patient's course progresses.        Objective:    Medication Review:   Scheduled Meds:aspirin, 81 mg, Oral, Daily  furosemide, 80 mg, Oral, BID  hydrALAZINE, 25 mg, Oral, Q12H  ipratropium-albuterol, 3 mL, Nebulization, 4x Daily -  RT  Lidocaine, 1 patch, Transdermal, Q24H  metoprolol succinate XL, 25 mg, Oral, Q12H  nicotine, 1 patch, Transdermal, Q24H  sodium chloride, 10 mL, Intravenous, Q12H      Continuous Infusions:   PRN Meds:.  acetaminophen    atropine    benzonatate    senna-docusate sodium **AND** polyethylene glycol **AND** bisacodyl **AND** bisacodyl    Calcium Replacement - Follow Nurse / BPA Driven Protocol    fentaNYL citrate (PF)    fentaNYL citrate (PF)    HYDROcodone-acetaminophen    hydrOXYzine    ipratropium-albuterol    Magnesium Low Dose Replacement - Follow Nurse / BPA Driven Protocol    Morphine    naloxone    nitroglycerin    Phosphorus Replacement - Follow Nurse / BPA Driven Protocol    Potassium Replacement - Follow Nurse / BPA Driven Protocol    [COMPLETED] Insert Peripheral IV **AND** sodium chloride    sodium chloride    sodium chloride    sodium chloride    traMADol      Acute on chronic HFrEF (heart failure with reduced ejection fraction)    CHF exacerbation    Rhinovirus infection         Physical Exam:    General: Alert, cooperative, no distress, appears stated age  Head:  Normocephalic, atraumatic, mucous membranes moist  Eyes:  Conjunctivae/corneas clear, EOMs intact     Neck:  Supple,  no bruit  Lungs:  Clear to auscultation bilaterally, no wheezes, rhonchi or rales are noted  Chest wall: No tenderness  Heart::  Regular rate and rhythm, S1 and S2 normal, 1/6 holosystolic murmur.  No rub or gallop  Abdomen: Soft, nontender, nondistended, bowel sounds active.  Obese  Extremities: No cyanosis, clubbing.  Edema noted.  Hematoma right thigh  Pulses: Diminished pedal pulses  Skin:  No rashes or lesions  Neuro/psych: A&O x3. CN II through XII are grossly intact with appropriate affect    Vital Signs:  Vitals:    05/18/24 0642 05/18/24 0645 05/18/24 0849 05/18/24 0917   BP:  118/65 122/65 122/66   BP Location:    Left arm   Patient Position:    Lying   Pulse: 88 83 80 80   Resp: 19 20  13   Temp:  97.3 °F (36.3  "°C)  98 °F (36.7 °C)   TempSrc:  Oral  Oral   SpO2: 100% 100%  97%   Weight:       Height:         Wt Readings from Last 1 Encounters:   05/18/24 98.9 kg (218 lb 0.6 oz)       Intake/Output Summary (Last 24 hours) at 5/18/2024 1130  Last data filed at 5/18/2024 0645  Gross per 24 hour   Intake 350 ml   Output 900 ml   Net -550 ml         Results Review:     CBC    Results from last 7 days   Lab Units 05/18/24  0916 05/18/24  0202 05/17/24  1429 05/17/24  0100 05/16/24  0501 05/15/24  0837 05/14/24  0234 05/13/24  0450   WBC 10*3/mm3  --  14.61* 14.61* 14.47* 19.88* 16.56* 11.87* 12.02*   HEMOGLOBIN g/dL 7.4* 7.0* 8.3* 9.8* 10.2* 9.9* 10.5* 10.3*   PLATELETS 10*3/mm3  --  249 351 346 383 380 357 388     Cr Clearance Estimated Creatinine Clearance: 78.2 mL/min (by C-G formula based on SCr of 0.98 mg/dL).  Coag   Results from last 7 days   Lab Units 05/11/24 2053   INR  1.26*   APTT seconds 26.6*     HbA1C No results found for: \"HGBA1C\"  Blood Glucose   Glucose   Date/Time Value Ref Range Status   05/17/2024 0907 96 70 - 105 mg/dL Final     Comment:     Serial Number: [id number]:  [id number]     Infection   Results from last 7 days   Lab Units 05/11/24 2147   BLOODCX  No growth at 5 days  No growth at 5 days     CMP   Results from last 7 days   Lab Units 05/18/24  0202 05/17/24  0100 05/16/24  0501 05/15/24  0837 05/14/24  0234 05/13/24  0450 05/12/24  1725 05/11/24 2053   SODIUM mmol/L 135* 137 137 133* 131* 137  --  136   POTASSIUM mmol/L 4.7 4.4 4.2 4.8 5.1 4.9  --  4.2   CHLORIDE mmol/L 98 96* 96* 92* 91* 96*  --  97*   CO2 mmol/L 33.0* 31.0* 32.0* 31.0* 29.0 28.0  --  25.0   BUN mg/dL 28* 34* 33* 39* 28* 23*  --  11   CREATININE mg/dL 0.98 1.17* 0.91 1.19* 1.43* 1.68* 0.96 0.89   GLUCOSE mg/dL 118* 134* 147* 201* 206* 118*  --  155*   ALBUMIN g/dL 2.9* 3.4* 3.3* 3.6 3.6 3.5  --  3.7   BILIRUBIN mg/dL 0.4 0.4 0.4 0.4 0.6 0.7  --  0.8   ALK PHOS U/L 103 113 125* 111 104 113  --  103   AST (SGOT) U/L 34* 14 " "11 13 18 24  --  16   ALT (SGPT) U/L 23 13 10 12 16 17  --  17   LIPASE U/L  --   --   --   --   --   --   --  27     ABG    Results from last 7 days   Lab Units 05/12/24  1725   PH, ARTERIAL pH units 7.295*   PCO2, ARTERIAL mm Hg 64.2*   PO2 ART mm Hg 91.8   O2 SATURATION ART % 95.8   BASE EXCESS ART mmol/L 3.0     UA    Results from last 7 days   Lab Units 05/13/24  1433   NITRITE UA  Negative     MARCELINO  No results found for: \"POCMETH\", \"POCAMPHET\", \"POCBARBITUR\", \"POCBENZO\", \"POCCOCAINE\", \"POCOPIATES\", \"POCOXYCODO\", \"POCPHENCYC\", \"POCPROPOXY\", \"POCTHC\", \"POCTRICYC\"  Lysis Labs   Results from last 7 days   Lab Units 05/18/24  0916 05/18/24  0202 05/17/24  1429 05/17/24  0100 05/16/24  0501 05/15/24  0837 05/14/24  0234 05/13/24  0450 05/12/24  1725 05/11/24 2053   INR   --   --   --   --   --   --   --   --   --  1.26*   APTT seconds  --   --   --   --   --   --   --   --   --  26.6*   HEMOGLOBIN g/dL 7.4* 7.0* 8.3* 9.8* 10.2* 9.9* 10.5* 10.3*  --  11.4*   HEMOGLOBIN, POC g/dL  --   --   --   --   --   --   --   --  12.8  --    PLATELETS 10*3/mm3  --  249 351 346 383 380 357 388  --  350   CREATININE mg/dL  --  0.98  --  1.17* 0.91 1.19* 1.43* 1.68* 0.96 0.89     Radiology(recent) CT Abdomen Pelvis With & Without Contrast    Result Date: 5/17/2024  Impression: 1.There is a large intermediate density right groin hematoma which extends superiorly within the lateral soft tissues of the right pelvis. This measures 25 x 11 x 6 cm. 2.No retroperitoneal hematoma is seen. 3.Small bilateral pleural effusions, mild pelvic ascites, and superficial soft tissue anasarca noted. 4.Additional findings as detailed above. Electronically Signed: Nicolas Eden MD  5/17/2024 3:18 PM EDT  Workstation ID: [id number]       Results from last 7 days   Lab Units 05/13/24  0450 05/12/24  0054   CK TOTAL U/L 59  --    HSTROP T ng/L  --  59*       Imaging Results (Last 24 Hours)       Procedure Component Value Units Date/Time    CT Abdomen " Pelvis With & Without Contrast [[id number]] Collected: 05/17/24 1509     Updated: 05/17/24 1521    Narrative:      CT ABDOMEN PELVIS W WO CONTRAST    Date of Exam: 5/17/2024 3:04 PM EDT    Indication: ? restroperitoneal bleed.    Comparison: None available.    Technique: Axial CT images were obtained of the abdomen and pelvis before and after the uneventful intravenous administration of iodinated contrast. Sagittal and coronal reconstructions were performed.  Automated exposure control and iterative   reconstruction methods were used.    Findings:    Liver: The liver is unremarkable in morphology. No focal liver lesion is seen. No biliary dilation is seen.    Gallbladder: Surgically absent.    Pancreas: Unremarkable.    Spleen: Unremarkable.    Adrenal glands: Low-density prominence/nodularity of the left adrenal gland. Right adrenal gland is unremarkable.    Genitourinary tract: Both kidneys demonstrate lobulated contours. Kidneys are otherwise unremarkable. No hydronephrosis is seen. The visualized portions of the ureters and urinary bladder appear unremarkable. Pelvic organs demonstrate no acute   abnormality.    Gastrointestinal tract: The visualized hollow viscera demonstrate no focal lesion. There is no evidence of bowel obstruction.    Appendix: No findings to suggest acute appendicitis.    Other findings: Mild low-density pelvic free fluid is seen. No free air is identified. No pathologically enlarged lymph nodes are seen. Mild vascular calcifications are present. The IVC is grossly unremarkable. No retroperitoneal hematoma is seen.    Bones and soft tissues: No acute osseous lesion is identified. There is a large intermediate density right groin hematoma which extends superiorly within the lateral soft tissues of the right pelvis, measuring up to 25 in length and 11 x 6 cm in   cross-sectional dimension superficial soft tissue anasarca is present.    Lung bases: Cardiomegaly with small bilateral pleural  effusions and bibasilar atelectasis. Calcified granuloma within the right lung base.      Impression:      Impression:  1.There is a large intermediate density right groin hematoma which extends superiorly within the lateral soft tissues of the right pelvis. This measures 25 x 11 x 6 cm.  2.No retroperitoneal hematoma is seen.  3.Small bilateral pleural effusions, mild pelvic ascites, and superficial soft tissue anasarca noted.  4.Additional findings as detailed above.      Electronically Signed: Nicolas Eden MD    5/17/2024 3:18 PM EDT    Workstation ID: [id number]            Cardiac Studies:  Echo- Results for orders placed during the hospital encounter of 05/11/24    Adult Transthoracic Echo Complete w/ Color, Spectral and Contrast if Necessary Per Protocol    Interpretation Summary    Left ventricular systolic function is severely decreased. Calculated left ventricular 3D EF = 18% Left ventricular ejection fraction appears to be less than 20%.    Left ventricular diastolic function is consistent with (grade II w/high LAP) pseudonormalization.    Severely reduced right ventricular systolic function noted.    The right ventricular cavity is moderately dilated.    The left atrial cavity is moderate to severely dilated.    The right atrial cavity is moderate to severely  dilated.    Moderate tricuspid valve regurgitation is present.    Estimated right ventricular systolic pressure from tricuspid regurgitation is moderately elevated (45-55 mmHg).    Moderate to severe pulmonary hypertension is present.    Stress Myoview-  Cath-        Max Cohen DO  05/18/24  11:30 EDT    Copied information has been reviewed and is current as of 05/18/24

## 2024-05-18 NOTE — CONSULTS
Name: Jen Cruz ADMIT: 2024   : 1970  PCP: Provider, No Known    MRN: [id number] LOS: 5 days   AGE/SEX: 53 y.o. female  ROOM:    [facility]    Patient Care Team:  Provider, No Known as PCP - Albert Russo MD as Consulting Physician (Nephrology)    CC: Postcatheterization right groin hematoma    History of Present Illness  53-year-old woman with history of congestive heart failure, who yesterday underwent cardiac catheterization via retrograde right femoral approach.  Maximum sheath size 6 [place].  No problems with puncture reported, no femoral arteriogram performed.  No closure device utilized.  Patient apparently experienced bleeding from the puncture site, and Dr. Garrido held pressure himself for a time.  Patient was very uncomfortable at the time and while a Femstop was applied.  Femstop was removed prior to hour of sleep.  Patient takes apixaban, last dose a.m. .  Patient reporting back and groin pain, but no abdominal pain.  Legs swollen and painful, as before.  Patient was weak this morning, without hypotension.  Received packed cell transfusion.  Not sure whether area is becoming more or less painful.    Past Medical History:   Diagnosis Date    CHF (congestive heart failure)     GERD (gastroesophageal reflux disease)     Hypertension      History reviewed. No pertinent surgical history.  History reviewed. No pertinent family history.  Social History     Tobacco Use    Smoking status: Every Day     Current packs/day: 0.25     Average packs/day: 0.3 packs/day for 24.4 years (6.1 ttl pk-yrs)     Types: Cigarettes     Start date:     Smokeless tobacco: Never   Vaping Use    Vaping status: Never Used   Substance Use Topics    Alcohol use: Not Currently    Drug use: Yes     Types: Oxycodone     Medications Prior to Admission   Medication Sig Dispense Refill Last Dose    apixaban (ELIQUIS) 5 MG tablet tablet Take 1 tablet by mouth 2 (Two) Times a Day.   2024     "furosemide (LASIX) 40 MG tablet Take 1 tablet by mouth Daily.   5/11/2024    losartan (COZAAR) 25 MG tablet Take 1 tablet by mouth Daily.   5/11/2024    metoprolol succinate XL (TOPROL-XL) 25 MG 24 hr tablet Take 1 tablet by mouth Daily.   5/11/2024    spironolactone (ALDACTONE) 25 MG tablet Take 1 tablet by mouth Daily.   5/11/2024     aspirin, 81 mg, Oral, Daily  furosemide, 80 mg, Oral, BID  hydrALAZINE, 25 mg, Oral, Q12H  ipratropium-albuterol, 3 mL, Nebulization, 4x Daily - RT  Lidocaine, 1 patch, Transdermal, Q24H  metoprolol succinate XL, 25 mg, Oral, Q12H  nicotine, 1 patch, Transdermal, Q24H  sodium chloride, 10 mL, Intravenous, Q12H    Heparin and Wasp venom    Vital Signs and Labs:  Vital Signs Patient Vitals for the past 24 hrs:   BP Temp Temp src Pulse Resp SpO2 Height Weight   05/18/24 0917 122/66 98 °F (36.7 °C) Oral 80 13 97 % -- --   05/18/24 0849 122/65 -- -- 80 -- -- -- --   05/18/24 0645 118/65 97.3 °F (36.3 °C) Oral 83 20 100 % -- --   05/18/24 0642 -- -- -- 88 19 100 % -- --   05/18/24 0500 107/60 98 °F (36.7 °C) Oral 69 18 95 % 165.1 cm (65\") 98.9 kg (218 lb 0.6 oz)   05/18/24 0431 110/64 98.1 °F (36.7 °C) Oral 73 16 96 % -- --   05/18/24 0403 121/67 98.3 °F (36.8 °C) Oral 71 16 96 % -- --   05/18/24 0100 120/66 98 °F (36.7 °C) Oral 71 16 99 % -- --   05/17/24 2025 127/80 97.8 °F (36.6 °C) Oral 72 16 -- -- --   05/17/24 1621 132/77 98.1 °F (36.7 °C) Oral 77 15 100 % -- --     I/O:  I/O last 3 completed shifts:  In: 350 [Blood:350]  Out: 900 [Urine:900]    Physical Exam: Obese woman, a bit uncomfortable from groin and back pain.  Moderate sized diffuse right groin hematoma with ecchymosis.  Overlying skin viable, with no necrosis.  Brawny edema of lower extremities.  Moderate symmetric dependent cyanosis of the right and left lower extremities.    CBC    Results from last 7 days   Lab Units 05/18/24  0916 05/18/24  0202 05/17/24  1429 05/17/24  0100 05/16/24  0501 05/15/24  0837 05/14/24  0234 " 05/13/24  0450   WBC 10*3/mm3  --  14.61* 14.61* 14.47* 19.88* 16.56* 11.87* 12.02*   HEMOGLOBIN g/dL 7.4* 7.0* 8.3* 9.8* 10.2* 9.9* 10.5* 10.3*   PLATELETS 10*3/mm3  --  249 351 346 383 380 357 388     BMP   Results from last 7 days   Lab Units 05/18/24  0202 05/17/24  0100 05/16/24  0501 05/15/24  0837 05/14/24  0234 05/13/24  0450 05/12/24  1725 05/11/24 2053   SODIUM mmol/L 135* 137 137 133* 131* 137  --  136   POTASSIUM mmol/L 4.7 4.4 4.2 4.8 5.1 4.9  --  4.2   CHLORIDE mmol/L 98 96* 96* 92* 91* 96*  --  97*   CO2 mmol/L 33.0* 31.0* 32.0* 31.0* 29.0 28.0  --  25.0   BUN mg/dL 28* 34* 33* 39* 28* 23*  --  11   CREATININE mg/dL 0.98 1.17* 0.91 1.19* 1.43* 1.68* 0.96 0.89   GLUCOSE mg/dL 118* 134* 147* 201* 206* 118*  --  155*   MAGNESIUM mg/dL 2.0 1.9 2.1 2.1 2.0 2.4  --   --    PHOSPHORUS mg/dL 3.5 2.3* 2.0* 3.4 3.5 4.0  --   --      Cr Clearance Estimated Creatinine Clearance: 78.2 mL/min (by C-G formula based on SCr of 0.98 mg/dL).  Coag   Results from last 7 days   Lab Units 05/11/24 2053   INR  1.26*   APTT seconds 26.6*     Arterial duplex scan of the right groin personally reviewed.  Hematoma without pseudoaneurysm.    Active Hospital Problems    Diagnosis  POA    **Acute on chronic HFrEF (heart failure with reduced ejection fraction) [I50.23]  Yes    Postoperative hematoma involving circulatory system following cardiac catheterization [I97.630]  No    Rhinovirus infection [B34.8]  Yes    CHF exacerbation [I50.9]  Yes      Resolved Hospital Problems   No resolved problems to display.     Assessment & Plan       Acute on chronic HFrEF (heart failure with reduced ejection fraction)    CHF exacerbation    Rhinovirus infection    Postoperative hematoma involving circulatory system following cardiac catheterization    53 y.o. female with cardiomyopathy, maintained on apixaban anticoagulation, who is now post procedure day 1 from cardiac catheterization via right transfemoral approach.  Apixaban held 24 hours  prior to procedure.  6 Albanian sheath, no closure device.  Had bleeding shortly after sheath removal, managed with manual pressure and with Femstop.  Suffered 2.8 g decrease in hemoglobin from 9.8 to minimum 7, with repeat this morning 7.4.  On examination has significant thigh swelling, but overlying skin appears viable.  Arterial duplex scan demonstrated groin hematoma without pseudoaneurysm.  Hoping that we can manage this nonoperatively.  Recommend minimizing activity, holding apixaban for now, serial H&H determinations.    I discussed my findings and recommendations with patient, nursing staff, and consulting provider.    Terell Porter MD  05/18/24, 15:05 EDT    Office contact: [phone]

## 2024-05-18 NOTE — PROGRESS NOTES
Vascular Surgery    Asked to see this 53-year-old woman with postcatheterization groin hematoma.  Upon entry, it seemed that the patient had requested AMA discharge, and in fact signed the document in my presence.  I did not have the opportunity to interview or examine her, nor do I know anything of the events that led up to her request.  Arterial duplex scan showed hematoma without pseudoaneurysm.  There is nothing to suggest that the patient is incompetent, so we have no basis on which to deny her request to leave.  She has the right to make decisions about her health care, even if they are unwise and may result in future undesirable outcomes.

## 2024-05-18 NOTE — NURSING NOTE
"Patient ask that after lunch, she'd like their sheets changed and to get a bath. I told her absolutely we can help with that. Patient called out because staff was \"taking to long\"   I explained to the patient the NA was giving another patient a bath and we'd be there soon.     NA went into patient room. Patient wanted to take a shower. I explained that, that was not an option. Due to patients hematoma post heart cath that required a blood transfusion and needling to keep the dressing dry. I also explained that we don't have clearance and will need to talk with vascular and cardiology. Patient became very angry and verbally aggressive, pt requested the charge nurse. CN spoke with the patient. The patient requested to Leave AMA. I presented the AMA papers and patient signed them with vascular surgeon at bedside. She did not want to speak with the MD.   "

## 2024-05-18 NOTE — PROGRESS NOTES
[facility]     Progress Note    Patient Name: Jen Cruz  : 1970  MRN: [id number]  Primary Care Physician:  Provider, No Known  Date of admission: 2024  Service date and time: 24 13:43 EDT  Subjective   Subjective     Chief Complaint: Shortness of breath     HPI:  Patient is denying any shortness of breath or any chest pain, complaining of right groin swelling.      Objective   Objective     Vitals:   Temp:  [97.3 °F (36.3 °C)-98.3 °F (36.8 °C)] 98 °F (36.7 °C)  Heart Rate:  [69-88] 80  Resp:  [13-20] 13  BP: (107-150)/(60-82) 122/66  Flow (L/min):  [2] 2  Physical Exam    Constitutional: Awake, alert no distress.   Eyes: PERRLA, sclerae anicteric, no conjunctival injection   HENT: NCAT, mucous membranes moist   Neck: Supple, no thyromegaly, no lymphadenopathy, trachea midline   Respiratory: Clear to auscultation bilaterally, nonlabored respirations    Cardiovascular: RRR, no murmurs, rubs, or gallops, palpable pedal pulses bilaterally   Gastrointestinal: Positive bowel sounds, soft, nontender, nondistended   Musculoskeletal: ROM swelling and erythema present   Psychiatric: Appropriate affect, cooperative   Neurologic: Oriented x 3, strength symmetric in all extremities, Cranial Nerves grossly intact to confrontation, speech clear   Skin: No rashes     Result Review    Result Review:  I have personally reviewed the results from the time of this admission to 2024 13:43 EDT and agree with these findings:  [x]  Laboratory list / accordion  []  Microbiology  []  Radiology  []  EKG/Telemetry   []  Cardiology/Vascular   []  Pathology  []  Old records  []  Other:  Most notable findings include: Globin 7.4, hematocrit 24.5, glucose 118, BUN 28, creatinine 0.98, sodium 135, potassium 4.7, chloride 98, CO2 33, calcium 8.4, total protein 5.5, albumin 2.9, ALT 23, AST 34      Assessment & Plan   Assessment / Plan       Active Hospital Problems:  Active Hospital Problems    Diagnosis     **Acute on  chronic HFrEF (heart failure with reduced ejection fraction)     Rhinovirus infection     CHF exacerbation      Plan:    Dyspnea multifactorial from bibasilar bacterial pneumonia, unspecified bacteria  Acute COPD exacerbation  Viral URI with rhinovirus  -CTA chest negative for PE but does show bilateral bibasilar pneumonia  -Finished up antibiotics with IV ceftriaxone and azithromycin  -RVP positive for rhinovirus  -Continue bronchodilators and finished steroids on 5/17  -Weaned down to room air and doing well     New onset acute HFrEF  NSTEMI  -Echo this admission shows severe LV systolic dysfunction with EF 20%  -Troponin elevation noted, could be type II MI per cardiology  -Needs LHC performed, nephrology has cleared patient for LHC, plan for LHC today per cardiology  -Continue diuresis with Lasix, monitor I's and O's  -Continue GDMT as per cardiology  -Cardiology following     Leukocytosis  -Reactive from steroids, patient clinically improving  -Finish up antibiotics as above     NICCI  Hyponatremia  -Likely ATN in setting of contra exposure and diuresis per nephrology  -Creatinine slowly improving  -Avoid ACE inhibitor or ARB for now  -Monitor sodium with diuresis  -Nephrology following     Hypertension  -Continue on metoprolol and hydralazine  -Monitor blood pressure, adjust as needed     History of DVT/PE  -Patient previously on Eliquis however was not taking it due to lack of insurance but now has insurance and willing to take Eliquis  -CTA chest negative for PE  -Continue Eliquis    Acute blood loss anemia: Status post PRBC transfusion  Earlier.  Ultrasound right groin showed a large right groin hematoma with no evidence of pseudoaneurysm    DVT prophylaxis:  Medical DVT prophylaxis orders are present.        CODE STATUS:   Code Status (Patient has no pulse and is not breathing): CPR (Attempt to Resuscitate)  Medical Interventions (Patient has pulse or is breathing): Full Support    Disposition:  I expect  patient to be discharged on 5/19/2024.    Kvein Devlin MD

## 2024-05-18 NOTE — PROGRESS NOTES
"NEPHROLOGY PROGRESS NOTE------[facility]    [facility]  [phone]  Helder Chong MD      Patient Care Team:  Provider, No Known as PCP - Albert Russo MD as Consulting Physician (Nephrology)      Provider:  Helder Chong MD  Patient Name: Jen Cruz  :  1970    SUBJECTIVE:    F/U ARF/NICCI/CRF/CKD    Weak. Awaiting PRBCs. S/P cath. No dysuria. No angina.     Medication:  Acetylcysteine, 600 mg, Oral, BID  aspirin, 81 mg, Oral, Daily  furosemide, 80 mg, Oral, BID  hydrALAZINE, 25 mg, Oral, Q12H  ipratropium-albuterol, 3 mL, Nebulization, 4x Daily - RT  Lidocaine, 1 patch, Transdermal, Q24H  metoprolol succinate XL, 25 mg, Oral, Q12H  nicotine, 1 patch, Transdermal, Q24H  predniSONE, 20 mg, Oral, Daily With Breakfast  sodium chloride, 10 mL, Intravenous, Q12H           OBJECTIVE    Vital Sign Min/Max for last 24 hours  Temp  Min: 97.3 °F (36.3 °C)  Max: 98.3 °F (36.8 °C)   BP  Min: 107/60  Max: 150/81   Pulse  Min: 69  Max: 90   Resp  Min: 11  Max: 20   SpO2  Min: 92 %  Max: 100 %   No data recorded   Weight  Min: 98.9 kg (218 lb 0.6 oz)  Max: 98.9 kg (218 lb 0.6 oz)     Flowsheet Rows      Flowsheet Row First Filed Value   Admission Height 165.1 cm (65\") Documented at 2024   Admission Weight 95.3 kg (210 lb) Documented at 2024            No intake/output data recorded.  I/O last 3 completed shifts:  In: 350 [Blood:350]  Out: 900 [Urine:900]    Physical Exam:  General Appearance: alert, appears stated age and cooperative  Head: normocephalic, without obvious abnormality and atraumatic  Eyes: conjunctivae and sclerae normal and no icterus  Neck: supple and no JVD  Lungs: scattered rhonchi  Heart: regular rhythm & normal rate and normal S1, S2 +GALLO  Chest: Wall no abnormalities observed  Abdomen: normal bowel sounds and soft, nontender  Extremities: moves extremities well, + edema, no cyanosis and no " "redness  Skin: no bleeding, bruising or rash, turgor normal, color normal and no lesions noted  Neurologic: Alert, and oriented. No focal deficits    Labs:    WBC WBC   Date Value Ref Range Status   05/18/2024 14.61 (H) 3.40 - 10.80 10*3/mm3 Final   05/17/2024 14.61 (H) 3.40 - 10.80 10*3/mm3 Final   05/17/2024 14.47 (H) 3.40 - 10.80 10*3/mm3 Final   05/16/2024 19.88 (H) 3.40 - 10.80 10*3/mm3 Final   05/15/2024 16.56 (H) 3.40 - 10.80 10*3/mm3 Final      HGB Hemoglobin   Date Value Ref Range Status   05/18/2024 7.0 (L) 12.0 - 15.9 g/dL Final   05/17/2024 8.3 (L) 12.0 - 15.9 g/dL Final   05/17/2024 9.8 (L) 12.0 - 15.9 g/dL Final   05/16/2024 10.2 (L) 12.0 - 15.9 g/dL Final   05/15/2024 9.9 (L) 12.0 - 15.9 g/dL Final      HCT Hematocrit   Date Value Ref Range Status   05/18/2024 23.7 (L) 34.0 - 46.6 % Final   05/17/2024 28.0 (L) 34.0 - 46.6 % Final   05/17/2024 33.4 (L) 34.0 - 46.6 % Final   05/16/2024 34.3 34.0 - 46.6 % Final   05/15/2024 33.4 (L) 34.0 - 46.6 % Final      Platelets No results found for: \"LABPLAT\"   MCV MCV   Date Value Ref Range Status   05/18/2024 78.2 (L) 79.0 - 97.0 fL Final   05/17/2024 77.8 (L) 79.0 - 97.0 fL Final   05/17/2024 77.9 (L) 79.0 - 97.0 fL Final   05/16/2024 78.0 (L) 79.0 - 97.0 fL Final   05/15/2024 78.0 (L) 79.0 - 97.0 fL Final          Sodium Sodium   Date Value Ref Range Status   05/18/2024 135 (L) 136 - 145 mmol/L Final   05/17/2024 137 136 - 145 mmol/L Final   05/16/2024 137 136 - 145 mmol/L Final   05/15/2024 133 (L) 136 - 145 mmol/L Final      Potassium Potassium   Date Value Ref Range Status   05/18/2024 4.7 3.5 - 5.2 mmol/L Final     Comment:     Slight hemolysis detected by analyzer. Result may be falsely elevated.   05/17/2024 4.4 3.5 - 5.2 mmol/L Final   05/16/2024 4.2 3.5 - 5.2 mmol/L Final   05/15/2024 4.8 3.5 - 5.2 mmol/L Final      Chloride Chloride   Date Value Ref Range Status   05/18/2024 98 98 - 107 mmol/L Final   05/17/2024 96 (L) 98 - 107 mmol/L Final " "  05/16/2024 96 (L) 98 - 107 mmol/L Final   05/15/2024 92 (L) 98 - 107 mmol/L Final      CO2 CO2   Date Value Ref Range Status   05/18/2024 33.0 (H) 22.0 - 29.0 mmol/L Final   05/17/2024 31.0 (H) 22.0 - 29.0 mmol/L Final   05/16/2024 32.0 (H) 22.0 - 29.0 mmol/L Final   05/15/2024 31.0 (H) 22.0 - 29.0 mmol/L Final      BUN BUN   Date Value Ref Range Status   05/18/2024 28 (H) 6 - 20 mg/dL Final   05/17/2024 34 (H) 6 - 20 mg/dL Final   05/16/2024 33 (H) 6 - 20 mg/dL Final   05/15/2024 39 (H) 6 - 20 mg/dL Final      Creatinine Creatinine   Date Value Ref Range Status   05/18/2024 0.98 0.57 - 1.00 mg/dL Final   05/17/2024 1.17 (H) 0.57 - 1.00 mg/dL Final   05/16/2024 0.91 0.57 - 1.00 mg/dL Final   05/15/2024 1.19 (H) 0.57 - 1.00 mg/dL Final      Calcium Calcium   Date Value Ref Range Status   05/18/2024 8.4 (L) 8.6 - 10.5 mg/dL Final   05/17/2024 9.1 8.6 - 10.5 mg/dL Final   05/16/2024 8.8 8.6 - 10.5 mg/dL Final   05/15/2024 9.3 8.6 - 10.5 mg/dL Final      PO4 No components found for: \"PO4\"   Albumin Albumin   Date Value Ref Range Status   05/18/2024 2.9 (L) 3.5 - 5.2 g/dL Final   05/17/2024 3.4 (L) 3.5 - 5.2 g/dL Final   05/16/2024 3.3 (L) 3.5 - 5.2 g/dL Final   05/15/2024 3.6 3.5 - 5.2 g/dL Final      Magnesium Magnesium   Date Value Ref Range Status   05/18/2024 2.0 1.6 - 2.6 mg/dL Final   05/17/2024 1.9 1.6 - 2.6 mg/dL Final   05/16/2024 2.1 1.6 - 2.6 mg/dL Final   05/15/2024 2.1 1.6 - 2.6 mg/dL Final      Uric Acid No components found for: \"URIC ACID\"     Imaging Results (Last 72 Hours)       Procedure Component Value Units Date/Time    CT Abdomen Pelvis With & Without Contrast [[id number]] Collected: 05/17/24 1509     Updated: 05/17/24 1521    Narrative:      CT ABDOMEN PELVIS W WO CONTRAST    Date of Exam: 5/17/2024 3:04 PM EDT    Indication: ? restroperitoneal bleed.    Comparison: None available.    Technique: Axial CT images were obtained of the abdomen and pelvis before and after the uneventful intravenous " administration of iodinated contrast. Sagittal and coronal reconstructions were performed.  Automated exposure control and iterative   reconstruction methods were used.    Findings:    Liver: The liver is unremarkable in morphology. No focal liver lesion is seen. No biliary dilation is seen.    Gallbladder: Surgically absent.    Pancreas: Unremarkable.    Spleen: Unremarkable.    Adrenal glands: Low-density prominence/nodularity of the left adrenal gland. Right adrenal gland is unremarkable.    Genitourinary tract: Both kidneys demonstrate lobulated contours. Kidneys are otherwise unremarkable. No hydronephrosis is seen. The visualized portions of the ureters and urinary bladder appear unremarkable. Pelvic organs demonstrate no acute   abnormality.    Gastrointestinal tract: The visualized hollow viscera demonstrate no focal lesion. There is no evidence of bowel obstruction.    Appendix: No findings to suggest acute appendicitis.    Other findings: Mild low-density pelvic free fluid is seen. No free air is identified. No pathologically enlarged lymph nodes are seen. Mild vascular calcifications are present. The IVC is grossly unremarkable. No retroperitoneal hematoma is seen.    Bones and soft tissues: No acute osseous lesion is identified. There is a large intermediate density right groin hematoma which extends superiorly within the lateral soft tissues of the right pelvis, measuring up to 25 in length and 11 x 6 cm in   cross-sectional dimension superficial soft tissue anasarca is present.    Lung bases: Cardiomegaly with small bilateral pleural effusions and bibasilar atelectasis. Calcified granuloma within the right lung base.      Impression:      Impression:  1.There is a large intermediate density right groin hematoma which extends superiorly within the lateral soft tissues of the right pelvis. This measures 25 x 11 x 6 cm.  2.No retroperitoneal hematoma is seen.  3.Small bilateral pleural effusions, mild  pelvic ascites, and superficial soft tissue anasarca noted.  4.Additional findings as detailed above.      Electronically Signed: Nicolas Eden MD    5/17/2024 3:18 PM EDT    Workstation ID: [id number]            Results for orders placed during the hospital encounter of 05/11/24    XR Chest 1 View    Narrative  XR CHEST 1 VW    Date of Exam: 5/11/2024 9:01 PM EDT    Indication: chest pain    Comparison: None available.    Findings:  The heart appears enlarged. Patchy airspace disease is seen within the bilateral lower lobes in the perihilar region. Probable small bilateral pleural effusions are present. Chronic appearing interstitial changes are present with indistinctness of the  pulmonary vasculature.. No acute osseous abnormality identified.    Impression  Impression:  Bibasilar pneumonia with probable mild underlying pulmonary edema pattern and small bilateral pleural effusions. There is mild cardiomegaly..      Electronically Signed: Sarah Good MD  5/11/2024 9:03 PM EDT  Workstation ID: [id number]      Results for orders placed during the hospital encounter of 05/11/24    Doppler Arterial Multi Level Lower Extremity - Bilateral CAR    Interpretation Summary    Right Conclusion: The right HALIMA is mildly reduced. Mild digital insufficiency.    Left Conclusion: The left HALIMA is moderately reduced. Mild digital insufficiency.        ASSESSMENT / PLAN      Acute on chronic HFrEF (heart failure with reduced ejection fraction)    CHF exacerbation    Rhinovirus infection    ARF/NICCI--------Nonoliguric. Resolved    2. ANEMIA------PRBCs today and follow    3. CAD S/P NSTEMI S/P CARDIAC CATH-----per Cardiology,     4. BENIGN ESSENTIAL HTN-----BP ok.    5. CHF WITH EF OF 20%------Cautious diuretic exposure    6. H/O HYPERCOAGULABLE STATE    7. HYPONATREMIA------Better with diuresis and po fluid restriction    8. HYPOALBUMINEMIA    9. RHINOVIRUS INFECTION      Helder Chong MD  [facility]  [phone]  05/18/24  07:27 EDT

## 2024-05-18 NOTE — PLAN OF CARE
Problem: Adult Inpatient Plan of Care  Goal: Plan of Care Review  Outcome: Ongoing, Progressing  Flowsheets (Taken 5/18/2024 0327)  Plan of Care Reviewed With: patient     Problem: Adjustment to Illness (Sepsis/Septic Shock)  Goal: Optimal Coping  Outcome: Ongoing, Progressing  Intervention: Optimize Psychosocial Adjustment to Illness  Recent Flowsheet Documentation  Taken 5/17/2024 2000 by Manju Jimenez RN  Supportive Measures: active listening utilized     Problem: Bleeding (Sepsis/Septic Shock)  Goal: Absence of Bleeding  Outcome: Ongoing, Progressing     Problem: Glycemic Control Impaired (Sepsis/Septic Shock)  Goal: Blood Glucose Level Within Desired Range  Outcome: Ongoing, Progressing     Problem: Infection Progression (Sepsis/Septic Shock)  Goal: Absence of Infection Signs and Symptoms  Outcome: Ongoing, Progressing  Intervention: Initiate Sepsis Management  Recent Flowsheet Documentation  Taken 5/18/2024 0200 by Manju Jimenez RN  Infection Prevention:   environmental surveillance performed   equipment surfaces disinfected   hand hygiene promoted   rest/sleep promoted   single patient room provided  Isolation Precautions:   precautions maintained   droplet  Taken 5/18/2024 0000 by Manju Jimenez RN  Infection Prevention:   environmental surveillance performed   equipment surfaces disinfected   hand hygiene promoted   rest/sleep promoted   single patient room provided  Isolation Precautions:   precautions maintained   droplet  Taken 5/17/2024 2200 by Manju Jimenez RN  Infection Prevention:   environmental surveillance performed   equipment surfaces disinfected   hand hygiene promoted   rest/sleep promoted   single patient room provided  Taken 5/17/2024 2000 by Manju Jimenez RN  Infection Prevention:   environmental surveillance performed   equipment surfaces disinfected   hand hygiene promoted   rest/sleep promoted   single patient room provided  Isolation Precautions:    precautions maintained   droplet     Problem: Nutrition Impaired (Sepsis/Septic Shock)  Goal: Optimal Nutrition Intake  Outcome: Ongoing, Progressing     Problem: Skin Injury Risk Increased  Goal: Skin Health and Integrity  Outcome: Ongoing, Progressing  Intervention: Optimize Skin Protection  Recent Flowsheet Documentation  Taken 5/18/2024 0200 by aMnju Jimenez RN  Head of Bed (HOB) Positioning: HOB elevated  Taken 5/18/2024 0000 by Manju Jimenez RN  Pressure Reduction Techniques: frequent weight shift encouraged  Head of Bed (HOB) Positioning: HOB elevated  Pressure Reduction Devices: pressure-redistributing mattress utilized  Skin Protection: adhesive use limited  Taken 5/17/2024 2200 by Manju Jimenez RN  Head of Bed (HOB) Positioning: HOB elevated  Taken 5/17/2024 2000 by Manju Jimenez RN  Pressure Reduction Techniques: frequent weight shift encouraged  Head of Bed (HOB) Positioning: HOB elevated  Pressure Reduction Devices: pressure-redistributing mattress utilized  Skin Protection: adhesive use limited     Problem: Fall Injury Risk  Goal: Absence of Fall and Fall-Related Injury  Outcome: Ongoing, Progressing  Intervention: Identify and Manage Contributors  Recent Flowsheet Documentation  Taken 5/17/2024 2000 by Manju Jimenez RN  Medication Review/Management: medications reviewed  Intervention: Promote Injury-Free Environment  Recent Flowsheet Documentation  Taken 5/18/2024 0200 by Manju Jimenez RN  Safety Promotion/Fall Prevention:   safety round/check completed   room organization consistent   nonskid shoes/slippers when out of bed   clutter free environment maintained   assistive device/personal items within reach   fall prevention program maintained  Taken 5/18/2024 0000 by Manju Jimenez RN  Safety Promotion/Fall Prevention:   safety round/check completed   room organization consistent   nonskid shoes/slippers when out of bed   clutter free environment maintained    assistive device/personal items within reach   fall prevention program maintained  Taken 5/17/2024 2200 by Manju Jimenez, RN  Safety Promotion/Fall Prevention:   safety round/check completed   room organization consistent   nonskid shoes/slippers when out of bed   clutter free environment maintained   assistive device/personal items within reach   fall prevention program maintained  Taken 5/17/2024 2000 by Manju Jimenez, RN  Safety Promotion/Fall Prevention:   safety round/check completed   room organization consistent   nonskid shoes/slippers when out of bed   clutter free environment maintained   assistive device/personal items within reach   fall prevention program maintained   Goal Outcome Evaluation:  Plan of Care Reviewed With: patient

## 2024-05-19 LAB
ALBUMIN SERPL-MCNC: 3.4 G/DL (ref 3.5–5.2)
ALBUMIN/GLOB SERPL: 1.1 G/DL
ALP SERPL-CCNC: 101 U/L (ref 39–117)
ALT SERPL W P-5'-P-CCNC: 23 U/L (ref 1–33)
ANION GAP SERPL CALCULATED.3IONS-SCNC: 7 MMOL/L (ref 5–15)
AST SERPL-CCNC: 25 U/L (ref 1–32)
BASOPHILS # BLD AUTO: 0.03 10*3/MM3 (ref 0–0.2)
BASOPHILS NFR BLD AUTO: 0.2 % (ref 0–1.5)
BH BB BLOOD EXPIRATION DATE: NORMAL
BH BB BLOOD TYPE BARCODE: 7300
BH BB DISPENSE STATUS: NORMAL
BH BB PRODUCT CODE: NORMAL
BH BB UNIT NUMBER: NORMAL
BILIRUB SERPL-MCNC: 0.7 MG/DL (ref 0–1.2)
BUN SERPL-MCNC: 23 MG/DL (ref 6–20)
BUN/CREAT SERPL: 22.5 (ref 7–25)
CALCIUM SPEC-SCNC: 9 MG/DL (ref 8.6–10.5)
CHLORIDE SERPL-SCNC: 92 MMOL/L (ref 98–107)
CO2 SERPL-SCNC: 35 MMOL/L (ref 22–29)
CREAT SERPL-MCNC: 1.02 MG/DL (ref 0.57–1)
CROSSMATCH INTERPRETATION: NORMAL
DEPRECATED RDW RBC AUTO: 51.6 FL (ref 37–54)
EGFRCR SERPLBLD CKD-EPI 2021: 66.3 ML/MIN/1.73
EOSINOPHIL # BLD AUTO: 0.26 10*3/MM3 (ref 0–0.4)
EOSINOPHIL NFR BLD AUTO: 1.7 % (ref 0.3–6.2)
ERYTHROCYTE [DISTWIDTH] IN BLOOD BY AUTOMATED COUNT: 18.1 % (ref 12.3–15.4)
GLOBULIN UR ELPH-MCNC: 3 GM/DL
GLUCOSE SERPL-MCNC: 131 MG/DL (ref 65–99)
HCT VFR BLD AUTO: 26 % (ref 34–46.6)
HGB BLD-MCNC: 7.7 G/DL (ref 12–15.9)
IMM GRANULOCYTES # BLD AUTO: 0.33 10*3/MM3 (ref 0–0.05)
IMM GRANULOCYTES NFR BLD AUTO: 2.2 % (ref 0–0.5)
LYMPHOCYTES # BLD AUTO: 2.85 10*3/MM3 (ref 0.7–3.1)
LYMPHOCYTES NFR BLD AUTO: 19 % (ref 19.6–45.3)
MAGNESIUM SERPL-MCNC: 2 MG/DL (ref 1.6–2.6)
MCH RBC QN AUTO: 23.5 PG (ref 26.6–33)
MCHC RBC AUTO-ENTMCNC: 29.6 G/DL (ref 31.5–35.7)
MCV RBC AUTO: 79.5 FL (ref 79–97)
MONOCYTES # BLD AUTO: 1.39 10*3/MM3 (ref 0.1–0.9)
MONOCYTES NFR BLD AUTO: 9.3 % (ref 5–12)
NEUTROPHILS NFR BLD AUTO: 10.12 10*3/MM3 (ref 1.7–7)
NEUTROPHILS NFR BLD AUTO: 67.6 % (ref 42.7–76)
NRBC BLD AUTO-RTO: 0 /100 WBC (ref 0–0.2)
PHOSPHATE SERPL-MCNC: 4.4 MG/DL (ref 2.5–4.5)
PLATELET # BLD AUTO: 249 10*3/MM3 (ref 140–450)
PMV BLD AUTO: 10 FL (ref 6–12)
POTASSIUM SERPL-SCNC: 4.7 MMOL/L (ref 3.5–5.2)
PROT SERPL-MCNC: 6.4 G/DL (ref 6–8.5)
RBC # BLD AUTO: 3.27 10*6/MM3 (ref 3.77–5.28)
SODIUM SERPL-SCNC: 134 MMOL/L (ref 136–145)
UNIT  ABO: NORMAL
UNIT  RH: NORMAL
WBC NRBC COR # BLD AUTO: 14.98 10*3/MM3 (ref 3.4–10.8)

## 2024-05-19 PROCEDURE — 25010000002 MORPHINE PER 10 MG: Performed by: INTERNAL MEDICINE

## 2024-05-19 PROCEDURE — 99232 SBSQ HOSP IP/OBS MODERATE 35: CPT | Performed by: SURGERY

## 2024-05-19 PROCEDURE — 80053 COMPREHEN METABOLIC PANEL: CPT | Performed by: INTERNAL MEDICINE

## 2024-05-19 PROCEDURE — 84100 ASSAY OF PHOSPHORUS: CPT | Performed by: INTERNAL MEDICINE

## 2024-05-19 PROCEDURE — 99232 SBSQ HOSP IP/OBS MODERATE 35: CPT | Performed by: INTERNAL MEDICINE

## 2024-05-19 PROCEDURE — 94664 DEMO&/EVAL PT USE INHALER: CPT

## 2024-05-19 PROCEDURE — 85025 COMPLETE CBC W/AUTO DIFF WBC: CPT | Performed by: INTERNAL MEDICINE

## 2024-05-19 PROCEDURE — 94799 UNLISTED PULMONARY SVC/PX: CPT

## 2024-05-19 PROCEDURE — 94761 N-INVAS EAR/PLS OXIMETRY MLT: CPT

## 2024-05-19 PROCEDURE — 83735 ASSAY OF MAGNESIUM: CPT | Performed by: INTERNAL MEDICINE

## 2024-05-19 RX ORDER — IBUPROFEN 200 MG
1 CAPSULE ORAL 2 TIMES DAILY PRN
Status: DISCONTINUED | OUTPATIENT
Start: 2024-05-19 | End: 2024-05-20 | Stop reason: HOSPADM

## 2024-05-19 RX ORDER — FUROSEMIDE 40 MG/1
40 TABLET ORAL
Status: DISCONTINUED | OUTPATIENT
Start: 2024-05-19 | End: 2024-05-19

## 2024-05-19 RX ORDER — OXYCODONE HYDROCHLORIDE 5 MG/1
5 TABLET ORAL EVERY 4 HOURS PRN
Status: DISCONTINUED | OUTPATIENT
Start: 2024-05-19 | End: 2024-05-20 | Stop reason: HOSPADM

## 2024-05-19 RX ORDER — FUROSEMIDE 40 MG/1
40 TABLET ORAL 3 TIMES DAILY
Status: DISCONTINUED | OUTPATIENT
Start: 2024-05-19 | End: 2024-05-20 | Stop reason: HOSPADM

## 2024-05-19 RX ADMIN — LIDOCAINE 1 PATCH: 4 PATCH TOPICAL at 08:01

## 2024-05-19 RX ADMIN — Medication 1 PATCH: at 08:02

## 2024-05-19 RX ADMIN — FUROSEMIDE 80 MG: 40 TABLET ORAL at 08:01

## 2024-05-19 RX ADMIN — HYDROXYZINE HYDROCHLORIDE 25 MG: 25 TABLET, FILM COATED ORAL at 21:08

## 2024-05-19 RX ADMIN — MORPHINE SULFATE 2 MG: 2 INJECTION, SOLUTION INTRAMUSCULAR; INTRAVENOUS at 17:50

## 2024-05-19 RX ADMIN — MORPHINE SULFATE 2 MG: 2 INJECTION, SOLUTION INTRAMUSCULAR; INTRAVENOUS at 12:19

## 2024-05-19 RX ADMIN — HYDROCODONE BITARTRATE AND ACETAMINOPHEN 1 TABLET: 10; 325 TABLET ORAL at 03:07

## 2024-05-19 RX ADMIN — OXYCODONE HYDROCHLORIDE 5 MG: 5 TABLET ORAL at 11:11

## 2024-05-19 RX ADMIN — OXYCODONE HYDROCHLORIDE 5 MG: 5 TABLET ORAL at 21:20

## 2024-05-19 RX ADMIN — IPRATROPIUM BROMIDE AND ALBUTEROL SULFATE 3 ML: .5; 3 SOLUTION RESPIRATORY (INHALATION) at 20:15

## 2024-05-19 RX ADMIN — HYDRALAZINE HYDROCHLORIDE 25 MG: 25 TABLET ORAL at 08:01

## 2024-05-19 RX ADMIN — SENNOSIDES AND DOCUSATE SODIUM 2 TABLET: 50; 8.6 TABLET ORAL at 11:11

## 2024-05-19 RX ADMIN — OXYCODONE HYDROCHLORIDE 5 MG: 5 TABLET ORAL at 15:32

## 2024-05-19 RX ADMIN — HYDROXYZINE HYDROCHLORIDE 25 MG: 25 TABLET, FILM COATED ORAL at 03:47

## 2024-05-19 RX ADMIN — METOPROLOL SUCCINATE 25 MG: 25 TABLET, FILM COATED, EXTENDED RELEASE ORAL at 08:01

## 2024-05-19 RX ADMIN — Medication 10 ML: at 08:11

## 2024-05-19 RX ADMIN — MORPHINE SULFATE 2 MG: 2 INJECTION, SOLUTION INTRAMUSCULAR; INTRAVENOUS at 00:32

## 2024-05-19 RX ADMIN — IPRATROPIUM BROMIDE AND ALBUTEROL SULFATE 3 ML: .5; 3 SOLUTION RESPIRATORY (INHALATION) at 16:05

## 2024-05-19 RX ADMIN — HYDROCODONE BITARTRATE AND ACETAMINOPHEN 1 TABLET: 10; 325 TABLET ORAL at 07:09

## 2024-05-19 RX ADMIN — ASPIRIN 81 MG: 81 TABLET, COATED ORAL at 08:01

## 2024-05-19 RX ADMIN — FUROSEMIDE 40 MG: 40 TABLET ORAL at 15:32

## 2024-05-19 RX ADMIN — HYDRALAZINE HYDROCHLORIDE 25 MG: 25 TABLET ORAL at 21:06

## 2024-05-19 RX ADMIN — FUROSEMIDE 40 MG: 40 TABLET ORAL at 21:07

## 2024-05-19 RX ADMIN — SENNOSIDES AND DOCUSATE SODIUM 2 TABLET: 50; 8.6 TABLET ORAL at 21:07

## 2024-05-19 RX ADMIN — IPRATROPIUM BROMIDE AND ALBUTEROL SULFATE 3 ML: .5; 3 SOLUTION RESPIRATORY (INHALATION) at 07:41

## 2024-05-19 RX ADMIN — METOPROLOL SUCCINATE 25 MG: 25 TABLET, FILM COATED, EXTENDED RELEASE ORAL at 21:08

## 2024-05-19 NOTE — PROGRESS NOTES
Cardiology Progress Note      PATIENT IDENTIFICATION    Name: Jen Cruz  Age: 53 y.o. Sex: female : 1970  MRN: [id number]    Requesting Provider    Kevin Devlin MD     LOS: 6 days       Reason For Followup:  Heart failure reduced ejection fraction  Newly diagnosed cardiomyopathy  Non-STEMI      Subjective:    Interval History:  Seen and examined.  Chart and labs reviewed.  Creatinine appears to remain stable.  Hemoglobin appears to remain stable.  Patient in in much better spirits today.  Reports that she had a bad restless night.  No chest pain.  Right thigh still painful.  Hemoglobin and creatinine have remained stable.  No further transfusions required.  Patient continues to ambulate essentially ad jacqueline despite vascular surgery recommendation to minimize activity.    Review of Systems:  A complete review of systems was undertaken with the pertinent cardiovascular findings listed in history of present illness and all other systems being negative.     Assessment & Plan    Impressions:  Heart failure reduced ejection fraction  Nonischemic cardiomyopathy EF 20% this admission  Non-STEMI likely secondary to demand ischemia  Hypertension  Acute blood loss anemia status posttransfusion    Recommendations:  Cardiac catheterization with no evidence of epicardial occlusive disease  Postprocedural bleeding/hematoma remained stable and is being followed by vascular surgery  Monitor rate and rhythm  Continue with long-acting beta-blocker  Patient is on angiotensin receptor blocker at home but ideally would benefit from Entresto when/if okay with nephrology  LifeVest pending  Will hold on resumption of Eliquis and defer timing of resumption to primary cardiologist.  Further recommendations as patient's course progresses.        Objective:    Medication Review:   Scheduled Meds:aspirin, 81 mg, Oral, Daily  furosemide, 40 mg, Oral, TID  hydrALAZINE, 25 mg, Oral, Q12H  ipratropium-albuterol, 3 mL, Nebulization, 4x  Daily - RT  Lidocaine, 1 patch, Transdermal, Q24H  metoprolol succinate XL, 25 mg, Oral, Q12H  nicotine, 1 patch, Transdermal, Q24H  sodium chloride, 10 mL, Intravenous, Q12H      Continuous Infusions:   PRN Meds:.  acetaminophen    atropine    benzonatate    senna-docusate sodium **AND** polyethylene glycol **AND** bisacodyl **AND** bisacodyl    Calcium Replacement - Follow Nurse / BPA Driven Protocol    fentaNYL citrate (PF)    fentaNYL citrate (PF)    HYDROcodone-acetaminophen    hydrOXYzine    ipratropium-albuterol    Magnesium Low Dose Replacement - Follow Nurse / BPA Driven Protocol    Morphine    naloxone    nitroglycerin    oxyCODONE    Phosphorus Replacement - Follow Nurse / BPA Driven Protocol    Potassium Replacement - Follow Nurse / BPA Driven Protocol    [COMPLETED] Insert Peripheral IV **AND** sodium chloride    sodium chloride    sodium chloride    sodium chloride    traMADol      Acute on chronic HFrEF (heart failure with reduced ejection fraction)    CHF exacerbation    Rhinovirus infection    Postoperative hematoma involving circulatory system following cardiac catheterization    Acute blood loss anemia         Physical Exam:    General: Alert, cooperative, no distress, appears stated age  Head:  Normocephalic, atraumatic, mucous membranes moist  Eyes:  Conjunctivae/corneas clear, EOMs intact     Neck:  Supple,  no bruit  Lungs:  Clear to auscultation bilaterally, no wheezes, rhonchi or rales are noted  Chest wall: No tenderness  Heart::  Regular rate and rhythm, S1 and S2 normal, 1/6 holosystolic murmur.  No rub or gallop  Abdomen: Soft, nontender, nondistended, bowel sounds active.  Obese  Extremities: No cyanosis, clubbing.  Edema noted.  Hematoma right thigh  Pulses: Diminished pedal pulses  Skin:  No rashes or lesions  Neuro/psych: A&O x3. CN II through XII are grossly intact with appropriate affect    Vital Signs:  Vitals:    05/19/24 0741 05/19/24 0745 05/19/24 0801 05/19/24 0835   BP:    "135/60 132/64   BP Location:    Left arm   Patient Position:    Lying   Pulse: 96 85 85 91   Resp: 19 21 19   Temp:    97.9 °F (36.6 °C)   TempSrc:    Oral   SpO2: 96% 100%  95%   Weight:       Height:         Wt Readings from Last 1 Encounters:   05/19/24 90 kg (198 lb 6.6 oz)       Intake/Output Summary (Last 24 hours) at 5/19/2024 1126  Last data filed at 5/18/2024 1330  Gross per 24 hour   Intake --   Output 1000 ml   Net -1000 ml         Results Review:     CBC    Results from last 7 days   Lab Units 05/19/24  0636 05/18/24  0916 05/18/24  0202 05/17/24  1429 05/17/24  0100 05/16/24  0501 05/15/24  0837 05/14/24  0234   WBC 10*3/mm3 14.98*  --  14.61* 14.61* 14.47* 19.88* 16.56* 11.87*   HEMOGLOBIN g/dL 7.7* 7.4* 7.0* 8.3* 9.8* 10.2* 9.9* 10.5*   PLATELETS 10*3/mm3 249  --  249 351 346 383 380 357     Cr Clearance Estimated Creatinine Clearance: 71.5 mL/min (A) (by C-G formula based on SCr of 1.02 mg/dL (H)).  Coag         HbA1C No results found for: \"HGBA1C\"  Blood Glucose   Glucose   Date/Time Value Ref Range Status   05/17/2024 0907 96 70 - 105 mg/dL Final     Comment:     Serial Number: [id number]:  [id number]     Infection         CMP   Results from last 7 days   Lab Units 05/19/24  0636 05/18/24  0202 05/17/24  0100 05/16/24  0501 05/15/24  0837 05/14/24  0234 05/13/24  0450   SODIUM mmol/L 134* 135* 137 137 133* 131* 137   POTASSIUM mmol/L 4.7 4.7 4.4 4.2 4.8 5.1 4.9   CHLORIDE mmol/L 92* 98 96* 96* 92* 91* 96*   CO2 mmol/L 35.0* 33.0* 31.0* 32.0* 31.0* 29.0 28.0   BUN mg/dL 23* 28* 34* 33* 39* 28* 23*   CREATININE mg/dL 1.02* 0.98 1.17* 0.91 1.19* 1.43* 1.68*   GLUCOSE mg/dL 131* 118* 134* 147* 201* 206* 118*   ALBUMIN g/dL 3.4* 2.9* 3.4* 3.3* 3.6 3.6 3.5   BILIRUBIN mg/dL 0.7 0.4 0.4 0.4 0.4 0.6 0.7   ALK PHOS U/L 101 103 113 125* 111 104 113   AST (SGOT) U/L 25 34* 14 11 13 18 24   ALT (SGPT) U/L 23 23 13 10 12 16 17     ABG    Results from last 7 days   Lab Units 05/12/24  1725   PH, ARTERIAL " "pH units 7.295*   PCO2, ARTERIAL mm Hg 64.2*   PO2 ART mm Hg 91.8   O2 SATURATION ART % 95.8   BASE EXCESS ART mmol/L 3.0     UA    Results from last 7 days   Lab Units 05/13/24  1433   NITRITE UA  Negative     MARCELINO  No results found for: \"POCMETH\", \"POCAMPHET\", \"POCBARBITUR\", \"POCBENZO\", \"POCCOCAINE\", \"POCOPIATES\", \"POCOXYCODO\", \"POCPHENCYC\", \"POCPROPOXY\", \"POCTHC\", \"POCTRICYC\"  Lysis Labs   Results from last 7 days   Lab Units 05/19/24  0636 05/18/24  0916 05/18/24  0202 05/17/24  1429 05/17/24  0100 05/16/24  0501 05/15/24  0837 05/14/24  0234 05/13/24  0450   HEMOGLOBIN g/dL 7.7* 7.4* 7.0* 8.3* 9.8* 10.2* 9.9* 10.5* 10.3*   PLATELETS 10*3/mm3 249  --  249 351 346 383 380 357 388   CREATININE mg/dL 1.02*  --  0.98  --  1.17* 0.91 1.19* 1.43* 1.68*     Radiology(recent) CT Abdomen Pelvis With & Without Contrast    Result Date: 5/17/2024  Impression: 1.There is a large intermediate density right groin hematoma which extends superiorly within the lateral soft tissues of the right pelvis. This measures 25 x 11 x 6 cm. 2.No retroperitoneal hematoma is seen. 3.Small bilateral pleural effusions, mild pelvic ascites, and superficial soft tissue anasarca noted. 4.Additional findings as detailed above. Electronically Signed: Nicolas Eden MD  5/17/2024 3:18 PM EDT  Workstation ID: [id number]       Results from last 7 days   Lab Units 05/13/24  0450   CK TOTAL U/L 59       Imaging Results (Last 24 Hours)       ** No results found for the last 24 hours. **            Cardiac Studies:  Echo- Results for orders placed during the hospital encounter of 05/11/24    Adult Transthoracic Echo Complete w/ Color, Spectral and Contrast if Necessary Per Protocol    Interpretation Summary    Left ventricular systolic function is severely decreased. Calculated left ventricular 3D EF = 18% Left ventricular ejection fraction appears to be less than 20%.    Left ventricular diastolic function is consistent with (grade II w/high LAP) " pseudonormalization.    Severely reduced right ventricular systolic function noted.    The right ventricular cavity is moderately dilated.    The left atrial cavity is moderate to severely dilated.    The right atrial cavity is moderate to severely  dilated.    Moderate tricuspid valve regurgitation is present.    Estimated right ventricular systolic pressure from tricuspid regurgitation is moderately elevated (45-55 mmHg).    Moderate to severe pulmonary hypertension is present.    Stress Myoview-  Cath-        Max Cohen DO  05/19/24  11:26 EDT    Copied information has been reviewed and is current as of 05/19/24

## 2024-05-19 NOTE — PLAN OF CARE
Goal Outcome Evaluation:           Progress: no change  Outcome Evaluation: Patient has complained of leg and back pain numerous times this shift. PRN pain medications not relieving pain per patient, will continue to monitor.

## 2024-05-19 NOTE — PROGRESS NOTES
[facility]     Progress Note    Patient Name: Jen Cruz  : 1970  MRN: [id number]  Primary Care Physician:  Provider, No Known  Date of admission: 2024  Service date and time: 24 11:33 EDT  Subjective   Subjective     Chief Complaint: Shortness of breath     HPI:  Patient Reports is complaining of pain in the right groin       Objective   Objective     Vitals:   Temp:  [97.7 °F (36.5 °C)-98 °F (36.7 °C)] 97.9 °F (36.6 °C)  Heart Rate:  [75-98] 91  Resp:  [14-21] 19  BP: (107-135)/(54-68) 132/64  Physical Exam    Constitutional: Awake, alert in no distress   Eyes: PERRLA, sclerae anicteric, no conjunctival injection   HENT: NCAT, mucous membranes moist   Neck: Supple, no thyromegaly, no lymphadenopathy, trachea midline   Respiratory: Clear to auscultation bilaterally, nonlabored respirations    Cardiovascular: RRR, no murmurs, rubs, or gallops, palpable pedal pulses bilaterally   Gastrointestinal: Positive bowel sounds, soft, nontender, nondistended   Musculoskeletal: No bilateral ankle edema, no clubbing or cyanosis to extremities   Psychiatric: Appropriate affect, cooperative   Neurologic: Oriented x 3, strength symmetric in all extremities, Cranial Nerves grossly intact to confrontation, speech clear   Skin: No rashes     Result Review    Result Review:  I have personally reviewed the results from the time of this admission to 2024 11:33 EDT and agree with these findings:  [x]  Laboratory list / accordion  []  Microbiology  []  Radiology  []  EKG/Telemetry   []  Cardiology/Vascular   []  Pathology  []  Old records  []  Other:  Most notable findings include: BUN 23, creatinine 1.02, sodium 134, glucose 131, chloride 92, CO2 35, albumin 3.4, ALT 23, AST 25, WBC count 14.98, hemoglobin 7.7, hematocrit 26 platelet count 249      Assessment & Plan   Assessment / Plan       Active Hospital Problems:  Active Hospital Problems    Diagnosis     **Acute on chronic HFrEF (heart failure with  reduced ejection fraction)     Postoperative hematoma involving circulatory system following cardiac catheterization     Acute blood loss anemia     Rhinovirus infection     CHF exacerbation      Plan:     Dyspnea multifactorial from bibasilar bacterial pneumonia, unspecified bacteria  Acute COPD exacerbation  Viral URI with rhinovirus  -CTA chest negative for PE but does show bilateral bibasilar pneumonia  -Finished up antibiotics with IV ceftriaxone and azithromycin  -RVP positive for rhinovirus  -Continue bronchodilators and finished steroids on 5/17  -Weaned down to room air and doing well     New onset acute HFrEF  NSTEMI  -Echo this admission shows severe LV systolic dysfunction with EF 20%  -Troponin elevation noted, could be type II MI per cardiology  -Needs LHC performed, nephrology has cleared patient for LHC, plan for LHC today per cardiology  -Continue diuresis with Lasix, monitor I's and O's  -Continue GDMT as per cardiology  -Cardiology following     Leukocytosis  -Reactive from steroids, patient clinically improving  -Finish up antibiotics as above     NICCI  Hyponatremia  -Likely ATN in setting of contra exposure and diuresis per nephrology  -Creatinine slowly improving  -Avoid ACE inhibitor or ARB for now  -Monitor sodium with diuresis  -Nephrology following     Hypertension  -Continue on metoprolol and hydralazine  -Monitor blood pressure, adjust as needed     History of DVT/PE  -Patient previously on Eliquis however was not taking it due to lack of insurance but now has insurance and willing to take Eliquis  -CTA chest negative for PE  -Continue Eliquis     Acute blood loss anemia: Status post PRBC transfusion  Earlier.  Ultrasound right groin showed a large right groin hematoma with no evidence of pseudoaneurysm    Add Oxycodone as needed for pain        DVT prophylaxis:  Medical DVT prophylaxis orders are present.        CODE STATUS:   Code Status (Patient has no pulse and is not breathing): CPR  (Attempt to Resuscitate)  Medical Interventions (Patient has pulse or is breathing): Full Support    Disposition:  I expect patient to be discharged on 5/20/2024.    Kevin Devlin MD

## 2024-05-19 NOTE — PROGRESS NOTES
"NEPHROLOGY PROGRESS NOTE------[facility]    [facility]  [phone]  Helder Chong MD      Patient Care Team:  Provider, No Known as PCP - Albert Russo MD as Consulting Physician (Nephrology)      Provider:  Helder Chong MD  Patient Name: Jen Cruz  :  1970    SUBJECTIVE:    F/U ARF/NICCI/CRF/CKD    Still with pain in R groin. Still with edema but better. No angina. No dysuria.     Medication:  aspirin, 81 mg, Oral, Daily  furosemide, 80 mg, Oral, BID  hydrALAZINE, 25 mg, Oral, Q12H  ipratropium-albuterol, 3 mL, Nebulization, 4x Daily - RT  Lidocaine, 1 patch, Transdermal, Q24H  metoprolol succinate XL, 25 mg, Oral, Q12H  nicotine, 1 patch, Transdermal, Q24H  sodium chloride, 10 mL, Intravenous, Q12H           OBJECTIVE    Vital Sign Min/Max for last 24 hours  Temp  Min: 97.7 °F (36.5 °C)  Max: 98 °F (36.7 °C)   BP  Min: 107/54  Max: 135/60   Pulse  Min: 75  Max: 98   Resp  Min: 13  Max: 21   SpO2  Min: 96 %  Max: 100 %   No data recorded   Weight  Min: 90 kg (198 lb 6.6 oz)  Max: 90 kg (198 lb 6.6 oz)     Flowsheet Rows      Flowsheet Row First Filed Value   Admission Height 165.1 cm (65\") Documented at 2024   Admission Weight 95.3 kg (210 lb) Documented at 2024            No intake/output data recorded.  I/O last 3 completed shifts:  In: 350 [Blood:350]  Out: 1900 [Urine:1900]    Physical Exam:  General Appearance: alert, appears stated age and cooperative  Head: normocephalic, without obvious abnormality and atraumatic  Eyes: conjunctivae and sclerae normal and no icterus  Neck: supple and +MINIMAL JVD  Lungs: scattered rhonchi  Heart: regular rhythm & normal rate and normal S1, S2 +GALLO  Chest: Wall no abnormalities observed  Abdomen: normal bowel sounds and soft, nontender  Extremities: moves extremities well, 1+ BILAT LE EDEMA, no cyanosis and no redness  Skin: no bleeding, bruising or rash, " "turgor normal, color normal and no lesions noted  Neurologic: Alert, and oriented. No focal deficits    Labs:    WBC WBC   Date Value Ref Range Status   05/19/2024 14.98 (H) 3.40 - 10.80 10*3/mm3 Final   05/18/2024 14.61 (H) 3.40 - 10.80 10*3/mm3 Final   05/17/2024 14.61 (H) 3.40 - 10.80 10*3/mm3 Final   05/17/2024 14.47 (H) 3.40 - 10.80 10*3/mm3 Final      HGB Hemoglobin   Date Value Ref Range Status   05/19/2024 7.7 (L) 12.0 - 15.9 g/dL Final   05/18/2024 7.4 (L) 12.0 - 15.9 g/dL Final   05/18/2024 7.0 (L) 12.0 - 15.9 g/dL Final   05/17/2024 8.3 (L) 12.0 - 15.9 g/dL Final   05/17/2024 9.8 (L) 12.0 - 15.9 g/dL Final      HCT Hematocrit   Date Value Ref Range Status   05/19/2024 26.0 (L) 34.0 - 46.6 % Final   05/18/2024 24.5 (L) 34.0 - 46.6 % Final   05/18/2024 23.7 (L) 34.0 - 46.6 % Final   05/17/2024 28.0 (L) 34.0 - 46.6 % Final   05/17/2024 33.4 (L) 34.0 - 46.6 % Final      Platelets No results found for: \"LABPLAT\"   MCV MCV   Date Value Ref Range Status   05/19/2024 79.5 79.0 - 97.0 fL Final   05/18/2024 78.2 (L) 79.0 - 97.0 fL Final   05/17/2024 77.8 (L) 79.0 - 97.0 fL Final   05/17/2024 77.9 (L) 79.0 - 97.0 fL Final          Sodium Sodium   Date Value Ref Range Status   05/19/2024 134 (L) 136 - 145 mmol/L Final   05/18/2024 135 (L) 136 - 145 mmol/L Final   05/17/2024 137 136 - 145 mmol/L Final      Potassium Potassium   Date Value Ref Range Status   05/19/2024 4.7 3.5 - 5.2 mmol/L Final   05/18/2024 4.7 3.5 - 5.2 mmol/L Final     Comment:     Slight hemolysis detected by analyzer. Result may be falsely elevated.   05/17/2024 4.4 3.5 - 5.2 mmol/L Final      Chloride Chloride   Date Value Ref Range Status   05/19/2024 92 (L) 98 - 107 mmol/L Final   05/18/2024 98 98 - 107 mmol/L Final   05/17/2024 96 (L) 98 - 107 mmol/L Final      CO2 CO2   Date Value Ref Range Status   05/19/2024 35.0 (H) 22.0 - 29.0 mmol/L Final   05/18/2024 33.0 (H) 22.0 - 29.0 mmol/L Final   05/17/2024 31.0 (H) 22.0 - 29.0 mmol/L Final    " "  BUN BUN   Date Value Ref Range Status   05/19/2024 23 (H) 6 - 20 mg/dL Final   05/18/2024 28 (H) 6 - 20 mg/dL Final   05/17/2024 34 (H) 6 - 20 mg/dL Final      Creatinine Creatinine   Date Value Ref Range Status   05/19/2024 1.02 (H) 0.57 - 1.00 mg/dL Final   05/18/2024 0.98 0.57 - 1.00 mg/dL Final   05/17/2024 1.17 (H) 0.57 - 1.00 mg/dL Final      Calcium Calcium   Date Value Ref Range Status   05/19/2024 9.0 8.6 - 10.5 mg/dL Final   05/18/2024 8.4 (L) 8.6 - 10.5 mg/dL Final   05/17/2024 9.1 8.6 - 10.5 mg/dL Final      PO4 No components found for: \"PO4\"   Albumin Albumin   Date Value Ref Range Status   05/19/2024 3.4 (L) 3.5 - 5.2 g/dL Final   05/18/2024 2.9 (L) 3.5 - 5.2 g/dL Final   05/17/2024 3.4 (L) 3.5 - 5.2 g/dL Final      Magnesium Magnesium   Date Value Ref Range Status   05/19/2024 2.0 1.6 - 2.6 mg/dL Final   05/18/2024 2.0 1.6 - 2.6 mg/dL Final   05/17/2024 1.9 1.6 - 2.6 mg/dL Final      Uric Acid No components found for: \"URIC ACID\"     Imaging Results (Last 72 Hours)       Procedure Component Value Units Date/Time    CT Abdomen Pelvis With & Without Contrast [[id number]] Collected: 05/17/24 1509     Updated: 05/17/24 1521    Narrative:      CT ABDOMEN PELVIS W WO CONTRAST    Date of Exam: 5/17/2024 3:04 PM EDT    Indication: ? restroperitoneal bleed.    Comparison: None available.    Technique: Axial CT images were obtained of the abdomen and pelvis before and after the uneventful intravenous administration of iodinated contrast. Sagittal and coronal reconstructions were performed.  Automated exposure control and iterative   reconstruction methods were used.    Findings:    Liver: The liver is unremarkable in morphology. No focal liver lesion is seen. No biliary dilation is seen.    Gallbladder: Surgically absent.    Pancreas: Unremarkable.    Spleen: Unremarkable.    Adrenal glands: Low-density prominence/nodularity of the left adrenal gland. Right adrenal gland is unremarkable.    Genitourinary " tract: Both kidneys demonstrate lobulated contours. Kidneys are otherwise unremarkable. No hydronephrosis is seen. The visualized portions of the ureters and urinary bladder appear unremarkable. Pelvic organs demonstrate no acute   abnormality.    Gastrointestinal tract: The visualized hollow viscera demonstrate no focal lesion. There is no evidence of bowel obstruction.    Appendix: No findings to suggest acute appendicitis.    Other findings: Mild low-density pelvic free fluid is seen. No free air is identified. No pathologically enlarged lymph nodes are seen. Mild vascular calcifications are present. The IVC is grossly unremarkable. No retroperitoneal hematoma is seen.    Bones and soft tissues: No acute osseous lesion is identified. There is a large intermediate density right groin hematoma which extends superiorly within the lateral soft tissues of the right pelvis, measuring up to 25 in length and 11 x 6 cm in   cross-sectional dimension superficial soft tissue anasarca is present.    Lung bases: Cardiomegaly with small bilateral pleural effusions and bibasilar atelectasis. Calcified granuloma within the right lung base.      Impression:      Impression:  1.There is a large intermediate density right groin hematoma which extends superiorly within the lateral soft tissues of the right pelvis. This measures 25 x 11 x 6 cm.  2.No retroperitoneal hematoma is seen.  3.Small bilateral pleural effusions, mild pelvic ascites, and superficial soft tissue anasarca noted.  4.Additional findings as detailed above.      Electronically Signed: Nicolas Eden MD    5/17/2024 3:18 PM EDT    Workstation ID: [id number]            Results for orders placed during the hospital encounter of 05/11/24    XR Chest 1 View    Narrative  XR CHEST 1 VW    Date of Exam: 5/11/2024 9:01 PM EDT    Indication: chest pain    Comparison: None available.    Findings:  The heart appears enlarged. Patchy airspace disease is seen within the  bilateral lower lobes in the perihilar region. Probable small bilateral pleural effusions are present. Chronic appearing interstitial changes are present with indistinctness of the  pulmonary vasculature.. No acute osseous abnormality identified.    Impression  Impression:  Bibasilar pneumonia with probable mild underlying pulmonary edema pattern and small bilateral pleural effusions. There is mild cardiomegaly..      Electronically Signed: Sarah Good MD  5/11/2024 9:03 PM EDT  Workstation ID: [id number]      Results for orders placed during the hospital encounter of 05/11/24    Duplex Pseudoaneurysm CAR    Interpretation Summary    Large right groin hematoma superficial to the femoral vessels, but no apparent pseudoaneurysm or arteriovenous fistula.        ASSESSMENT / PLAN      Acute on chronic HFrEF (heart failure with reduced ejection fraction)    CHF exacerbation    Rhinovirus infection    Postoperative hematoma involving circulatory system following cardiac catheterization    Acute blood loss anemia    ARF/NICCI--------Nonoliguric. Better. Follow with ongoing diuretic exposure    2. ANEMIA------H/H up a little. Follow for further PRBC need    3. CAD S/P NSTEMI S/P CARDIAC CATH-----per Cardiology,     4. BENIGN ESSENTIAL HTN-----BP ok.    5. CHF WITH EF OF 20%------Cautious diuretic exposure. Change Lasix to TID    6. H/O HYPERCOAGULABLE STATE    7. HYPONATREMIA------Better with diuresis and po fluid restriction    8. HYPOALBUMINEMIA    9. RHINOVIRUS INFECTION      Helder Chong MD  [facility]  [phone]  05/19/24  08:29 EDT

## 2024-05-19 NOTE — NURSING NOTE
"Patient attempted to exit bed without assistance causing bed alarm to sound. Nurse turned off alarm and Patient stated \" I'm a grown adult and can get up as needed. Turn off that alarm now.\" Education given on need for alarm, patient still reusing.  "

## 2024-05-19 NOTE — PROGRESS NOTES
Name: Jen Cruz ADMIT: 2024   : 1970  PCP: Provider, No Known    MRN: [id number] LOS: 6 days   AGE/SEX: 53 y.o. female  ROOM:     Zaheer    53 y.o. female with postcatheterization right groin hematoma.  Still with fair amount of groin soreness, but significantly improved when compared to yesterday.  No nausea or vomiting.  Getting up and moving around ad jacqueline despite suggestions to lay low.    Scheduled Medications:   aspirin, 81 mg, Oral, Daily  furosemide, 40 mg, Oral, TID  hydrALAZINE, 25 mg, Oral, Q12H  ipratropium-albuterol, 3 mL, Nebulization, 4x Daily - RT  Lidocaine, 1 patch, Transdermal, Q24H  metoprolol succinate XL, 25 mg, Oral, Q12H  nicotine, 1 patch, Transdermal, Q24H  sodium chloride, 10 mL, Intravenous, Q12H    Vital Signs  Vital Signs Patient Vitals for the past 24 hrs:   BP Temp Temp src Pulse Resp SpO2 Weight   24 0835 132/64 97.9 °F (36.6 °C) Oral 91 19 95 % --   24 0801 135/60 -- -- 85 -- -- --   24 0745 -- -- -- 85 21 100 % --   24 0741 -- -- -- 96 19 96 % --   24 0500 129/68 97.8 °F (36.6 °C) Oral 93 18 100 % 90 kg (198 lb 6.6 oz)   24 0100 107/54 98 °F (36.7 °C) Oral 98 14 -- --   24 2100 115/55 97.9 °F (36.6 °C) Oral -- 18 -- --   24 -- -- -- 82 18 100 % --   248 -- -- -- 89 18 97 % --   24 1610 132/61 97.7 °F (36.5 °C) Oral 77 19 98 % --   24 1549 -- -- -- 75 14 98 % --   24 1546 -- -- -- 76 14 98 % --     I/O:  I/O last 3 completed shifts:  In: 350 [Blood:350]  Out: 1900 [Urine:1900]    Physical Exam: Appears comfortable.  Moving around without difficulty, from standing at bedside to getting into bed and moving from side-to-side.  Right thigh swollen with marked ecchymosis but no blisters, erosions or serous drainage.  Overlying skin viable.  No necrotic eschar.  No pulsatility or bruit.    CBC    Results from last 7 days   Lab Units 24  0636 24  0916 24  0202  05/17/24  1429 05/17/24  0100 05/16/24  0501 05/15/24  0837 05/14/24  0234   WBC 10*3/mm3 14.98*  --  14.61* 14.61* 14.47* 19.88* 16.56* 11.87*   HEMOGLOBIN g/dL 7.7* 7.4* 7.0* 8.3* 9.8* 10.2* 9.9* 10.5*   PLATELETS 10*3/mm3 249  --  249 351 346 383 380 357     BMP   Results from last 7 days   Lab Units 05/19/24  0636 05/18/24  0202 05/17/24  0100 05/16/24  0501 05/15/24  0837 05/14/24  0234 05/13/24  0450   SODIUM mmol/L 134* 135* 137 137 133* 131* 137   POTASSIUM mmol/L 4.7 4.7 4.4 4.2 4.8 5.1 4.9   CHLORIDE mmol/L 92* 98 96* 96* 92* 91* 96*   CO2 mmol/L 35.0* 33.0* 31.0* 32.0* 31.0* 29.0 28.0   BUN mg/dL 23* 28* 34* 33* 39* 28* 23*   CREATININE mg/dL 1.02* 0.98 1.17* 0.91 1.19* 1.43* 1.68*   GLUCOSE mg/dL 131* 118* 134* 147* 201* 206* 118*   MAGNESIUM mg/dL 2.0 2.0 1.9 2.1 2.1 2.0 2.4   PHOSPHORUS mg/dL 4.4 3.5 2.3* 2.0* 3.4 3.5 4.0     Cr Clearance Estimated Creatinine Clearance: 71.5 mL/min (A) (by C-G formula based on SCr of 1.02 mg/dL (H)).  Assessment & Plan   Assessment & Plan    Acute on chronic HFrEF (heart failure with reduced ejection fraction)    CHF exacerbation    Rhinovirus infection    Postoperative hematoma involving circulatory system following cardiac catheterization    Acute blood loss anemia    53 y.o. female with postcatheterization right groin hematoma.  It is difficult for me to tell from the electronic health record, but it seems that she received 1 unit PRBCs, with minimum hemoglobin 7.  Last 2 values 7.4, 7.7 yesterday morning and today.  Neither contrast CT and duplex showed pseudoaneurysm, only hematoma.  Apixaban on hold.  Seems as if the likelihood of surgical evacuation or arterial repair is decreasing with time.  I expected to be sore for a while, however.  Might hold apixaban another 24 hours before restarting, but risk of bleeding with apixaban must be weighed against risk of thromboembolism associated with cardiac disease.    Terell Porter MD  05/19/24, 11:27 EDT    Office  contact: [phone]

## 2024-05-19 NOTE — PLAN OF CARE
Goal Outcome Evaluation:      Patient refusing bed alarm. Encouraging patient to hit call light when needing to ambulate to the bathroom. Patient states that she hasn't had a bowel movement in a couple days and requested a stool softener. Patient has complained of pain three times this shift. Norco was given at shift change this morning. Hospitalist added oxycodone PRN q4. Patient requested a dose of this. An hour later patient states that it did not help her pain. Patient then requested morphine. No other complaints at this time. Plan of care ongoing.

## 2024-05-20 ENCOUNTER — READMISSION MANAGEMENT (OUTPATIENT)
Dept: CALL CENTER | Facility: HOSPITAL | Age: 54
End: 2024-05-20
Payer: MEDICAID

## 2024-05-20 VITALS
SYSTOLIC BLOOD PRESSURE: 131 MMHG | HEIGHT: 65 IN | RESPIRATION RATE: 10 BRPM | TEMPERATURE: 97.9 F | OXYGEN SATURATION: 100 % | HEART RATE: 86 BPM | DIASTOLIC BLOOD PRESSURE: 64 MMHG | WEIGHT: 196.65 LBS | BODY MASS INDEX: 32.76 KG/M2

## 2024-05-20 PROBLEM — I50.21 ACUTE HFREF (HEART FAILURE WITH REDUCED EJECTION FRACTION): Status: ACTIVE | Noted: 2024-05-20

## 2024-05-20 LAB
ALBUMIN SERPL-MCNC: 3.9 G/DL (ref 3.5–5.2)
ALBUMIN/GLOB SERPL: 1.1 G/DL
ALP SERPL-CCNC: 115 U/L (ref 39–117)
ALT SERPL W P-5'-P-CCNC: 26 U/L (ref 1–33)
ANION GAP SERPL CALCULATED.3IONS-SCNC: 10 MMOL/L (ref 5–15)
AST SERPL-CCNC: 29 U/L (ref 1–32)
BASOPHILS # BLD AUTO: 0.05 10*3/MM3 (ref 0–0.2)
BASOPHILS NFR BLD AUTO: 0.3 % (ref 0–1.5)
BILIRUB SERPL-MCNC: 1.1 MG/DL (ref 0–1.2)
BUN SERPL-MCNC: 24 MG/DL (ref 6–20)
BUN/CREAT SERPL: 23.5 (ref 7–25)
CALCIUM SPEC-SCNC: 9.2 MG/DL (ref 8.6–10.5)
CHLORIDE SERPL-SCNC: 87 MMOL/L (ref 98–107)
CO2 SERPL-SCNC: 36 MMOL/L (ref 22–29)
CREAT SERPL-MCNC: 1.02 MG/DL (ref 0.57–1)
DEPRECATED RDW RBC AUTO: 52.1 FL (ref 37–54)
EGFRCR SERPLBLD CKD-EPI 2021: 66.3 ML/MIN/1.73
EOSINOPHIL # BLD AUTO: 0.28 10*3/MM3 (ref 0–0.4)
EOSINOPHIL NFR BLD AUTO: 1.7 % (ref 0.3–6.2)
ERYTHROCYTE [DISTWIDTH] IN BLOOD BY AUTOMATED COUNT: 18.8 % (ref 12.3–15.4)
GLOBULIN UR ELPH-MCNC: 3.7 GM/DL
GLUCOSE SERPL-MCNC: 137 MG/DL (ref 65–99)
HCT VFR BLD AUTO: 28.7 % (ref 34–46.6)
HGB BLD-MCNC: 8.5 G/DL (ref 12–15.9)
IMM GRANULOCYTES # BLD AUTO: 0.33 10*3/MM3 (ref 0–0.05)
IMM GRANULOCYTES NFR BLD AUTO: 1.9 % (ref 0–0.5)
LYMPHOCYTES # BLD AUTO: 2.69 10*3/MM3 (ref 0.7–3.1)
LYMPHOCYTES NFR BLD AUTO: 15.9 % (ref 19.6–45.3)
MAGNESIUM SERPL-MCNC: 2.2 MG/DL (ref 1.6–2.6)
MCH RBC QN AUTO: 23.5 PG (ref 26.6–33)
MCHC RBC AUTO-ENTMCNC: 29.6 G/DL (ref 31.5–35.7)
MCV RBC AUTO: 79.5 FL (ref 79–97)
MONOCYTES # BLD AUTO: 1.46 10*3/MM3 (ref 0.1–0.9)
MONOCYTES NFR BLD AUTO: 8.6 % (ref 5–12)
NEUTROPHILS NFR BLD AUTO: 12.15 10*3/MM3 (ref 1.7–7)
NEUTROPHILS NFR BLD AUTO: 71.6 % (ref 42.7–76)
NRBC BLD AUTO-RTO: 0 /100 WBC (ref 0–0.2)
PHOSPHATE SERPL-MCNC: 4.5 MG/DL (ref 2.5–4.5)
PLATELET # BLD AUTO: 325 10*3/MM3 (ref 140–450)
PMV BLD AUTO: 9.5 FL (ref 6–12)
POTASSIUM SERPL-SCNC: 3.7 MMOL/L (ref 3.5–5.2)
PROT SERPL-MCNC: 7.6 G/DL (ref 6–8.5)
RBC # BLD AUTO: 3.61 10*6/MM3 (ref 3.77–5.28)
SODIUM SERPL-SCNC: 133 MMOL/L (ref 136–145)
WBC NRBC COR # BLD AUTO: 16.96 10*3/MM3 (ref 3.4–10.8)

## 2024-05-20 PROCEDURE — 25010000002 MORPHINE PER 10 MG: Performed by: INTERNAL MEDICINE

## 2024-05-20 PROCEDURE — 94799 UNLISTED PULMONARY SVC/PX: CPT

## 2024-05-20 PROCEDURE — 99232 SBSQ HOSP IP/OBS MODERATE 35: CPT | Performed by: INTERNAL MEDICINE

## 2024-05-20 PROCEDURE — 99232 SBSQ HOSP IP/OBS MODERATE 35: CPT | Performed by: NURSE PRACTITIONER

## 2024-05-20 PROCEDURE — 85025 COMPLETE CBC W/AUTO DIFF WBC: CPT | Performed by: INTERNAL MEDICINE

## 2024-05-20 PROCEDURE — 80053 COMPREHEN METABOLIC PANEL: CPT | Performed by: INTERNAL MEDICINE

## 2024-05-20 PROCEDURE — 84100 ASSAY OF PHOSPHORUS: CPT | Performed by: INTERNAL MEDICINE

## 2024-05-20 PROCEDURE — 83735 ASSAY OF MAGNESIUM: CPT | Performed by: INTERNAL MEDICINE

## 2024-05-20 PROCEDURE — 94664 DEMO&/EVAL PT USE INHALER: CPT

## 2024-05-20 RX ORDER — NALOXONE HYDROCHLORIDE 4 MG/.1ML
SPRAY NASAL
Qty: 2 EACH | Refills: 0 | Status: SHIPPED | OUTPATIENT
Start: 2024-05-20

## 2024-05-20 RX ORDER — POTASSIUM CHLORIDE 20 MEQ/1
20 TABLET, EXTENDED RELEASE ORAL 2 TIMES DAILY WITH MEALS
Status: DISCONTINUED | OUTPATIENT
Start: 2024-05-20 | End: 2024-05-20 | Stop reason: HOSPADM

## 2024-05-20 RX ORDER — ASPIRIN 81 MG/1
81 TABLET ORAL DAILY
Qty: 30 TABLET | Refills: 0 | Status: SHIPPED | OUTPATIENT
Start: 2024-05-21

## 2024-05-20 RX ORDER — BUDESONIDE AND FORMOTEROL FUMARATE DIHYDRATE 160; 4.5 UG/1; UG/1
2 AEROSOL RESPIRATORY (INHALATION)
Qty: 1 EACH | Refills: 1 | Status: SHIPPED | OUTPATIENT
Start: 2024-05-20

## 2024-05-20 RX ORDER — NICOTINE 21 MG/24HR
1 PATCH, TRANSDERMAL 24 HOURS TRANSDERMAL
Qty: 30 EACH | Refills: 0 | Status: SHIPPED | OUTPATIENT
Start: 2024-05-21

## 2024-05-20 RX ORDER — ACETAZOLAMIDE 250 MG/1
250 TABLET ORAL ONCE
Status: COMPLETED | OUTPATIENT
Start: 2024-05-20 | End: 2024-05-20

## 2024-05-20 RX ORDER — IPRATROPIUM BROMIDE AND ALBUTEROL 20; 100 UG/1; UG/1
1 SPRAY, METERED RESPIRATORY (INHALATION) 4 TIMES DAILY PRN
Qty: 1 EACH | Refills: 11 | Status: SHIPPED | OUTPATIENT
Start: 2024-05-20

## 2024-05-20 RX ORDER — POTASSIUM CHLORIDE 20 MEQ/1
40 TABLET, EXTENDED RELEASE ORAL ONCE
Status: COMPLETED | OUTPATIENT
Start: 2024-05-20 | End: 2024-05-20

## 2024-05-20 RX ORDER — IBUPROFEN 200 MG
1 CAPSULE ORAL 2 TIMES DAILY PRN
Qty: 28 G | Refills: 0 | Status: SHIPPED | OUTPATIENT
Start: 2024-05-20

## 2024-05-20 RX ORDER — OXYCODONE HYDROCHLORIDE 5 MG/1
5 TABLET ORAL EVERY 4 HOURS PRN
Qty: 10 TABLET | Refills: 0 | Status: SHIPPED | OUTPATIENT
Start: 2024-05-20 | End: 2024-05-24

## 2024-05-20 RX ORDER — HYDROXYZINE HYDROCHLORIDE 25 MG/1
25 TABLET, FILM COATED ORAL 3 TIMES DAILY PRN
Qty: 60 TABLET | Refills: 0 | Status: SHIPPED | OUTPATIENT
Start: 2024-05-20

## 2024-05-20 RX ORDER — HYDRALAZINE HYDROCHLORIDE 25 MG/1
25 TABLET, FILM COATED ORAL EVERY 12 HOURS SCHEDULED
Qty: 60 TABLET | Refills: 0 | Status: SHIPPED | OUTPATIENT
Start: 2024-05-20

## 2024-05-20 RX ADMIN — FUROSEMIDE 40 MG: 40 TABLET ORAL at 08:04

## 2024-05-20 RX ADMIN — Medication 1 PATCH: at 08:05

## 2024-05-20 RX ADMIN — OXYCODONE HYDROCHLORIDE 5 MG: 5 TABLET ORAL at 15:02

## 2024-05-20 RX ADMIN — METOPROLOL SUCCINATE 25 MG: 25 TABLET, FILM COATED, EXTENDED RELEASE ORAL at 08:04

## 2024-05-20 RX ADMIN — OXYCODONE HYDROCHLORIDE 5 MG: 5 TABLET ORAL at 08:04

## 2024-05-20 RX ADMIN — ASPIRIN 81 MG: 81 TABLET, COATED ORAL at 08:04

## 2024-05-20 RX ADMIN — POTASSIUM CHLORIDE 20 MEQ: 1500 TABLET, EXTENDED RELEASE ORAL at 17:39

## 2024-05-20 RX ADMIN — HYDRALAZINE HYDROCHLORIDE 25 MG: 25 TABLET ORAL at 08:04

## 2024-05-20 RX ADMIN — IPRATROPIUM BROMIDE AND ALBUTEROL SULFATE 3 ML: .5; 3 SOLUTION RESPIRATORY (INHALATION) at 12:11

## 2024-05-20 RX ADMIN — Medication 10 ML: at 08:04

## 2024-05-20 RX ADMIN — POTASSIUM CHLORIDE 40 MEQ: 1500 TABLET, EXTENDED RELEASE ORAL at 08:03

## 2024-05-20 RX ADMIN — MORPHINE SULFATE 2 MG: 2 INJECTION, SOLUTION INTRAMUSCULAR; INTRAVENOUS at 13:15

## 2024-05-20 RX ADMIN — FUROSEMIDE 40 MG: 40 TABLET ORAL at 17:10

## 2024-05-20 RX ADMIN — MORPHINE SULFATE 2 MG: 2 INJECTION, SOLUTION INTRAMUSCULAR; INTRAVENOUS at 01:30

## 2024-05-20 RX ADMIN — OXYCODONE HYDROCHLORIDE 5 MG: 5 TABLET ORAL at 04:09

## 2024-05-20 RX ADMIN — IPRATROPIUM BROMIDE AND ALBUTEROL SULFATE 3 ML: .5; 3 SOLUTION RESPIRATORY (INHALATION) at 07:45

## 2024-05-20 RX ADMIN — ACETAZOLAMIDE 250 MG: 250 TABLET ORAL at 08:04

## 2024-05-20 NOTE — NURSING NOTE
Patient alert and oriented, awaiting for her ride for discharge, day shift nurse already went over dc instructions and life vest, IV already out,  patient states she understands d/c instructions.

## 2024-05-20 NOTE — PLAN OF CARE
Goal Outcome Evaluation:  Plan of Care Reviewed With: patient   Patient alert and aware of self care and agreed to listen and try not to get upset about things out of her control.

## 2024-05-20 NOTE — PLAN OF CARE
Goal Outcome Evaluation:              Outcome Evaluation: Pt still complaining of back and leg pain, otherwise feeling much better. Awaiting life vest approval before discharge.

## 2024-05-20 NOTE — NURSING NOTE
Patient resting comfortably at this time, no complaints voiced, call light encouraged for all needs. Will continue to monitor.

## 2024-05-20 NOTE — DISCHARGE SUMMARY
[facility] Medicine Services  Discharge Summary    Date of Service: 24  Patient Name: Jen Cruz  : 1970  MRN: [id number]    Date of Admission: 2024  Discharge Diagnosis: Dyspnea multifactorial from bibasilar bacterial pneumonia, unspecified bacteria  Date of Discharge:  24  Primary Care Physician: Provider, No Known      Presenting Problem:   Shortness of breath [R06.02]  Rhinovirus [B34.8]  CHF exacerbation [I50.9]  Pneumonia of both lungs due to infectious organism, unspecified part of lung [J18.9]  Chest pain, unspecified type [R07.9]  Acute on chronic congestive heart failure, unspecified heart failure type [I50.9]  Sepsis without acute organ dysfunction, due to unspecified organism [A41.9]  Acute exacerbation of CHF (congestive heart failure) [I50.9]  Acute HFrEF (heart failure with reduced ejection fraction) [I50.21]    Active and Resolved Hospital Problems:  Active Hospital Problems    Diagnosis POA    **Acute on chronic HFrEF (heart failure with reduced ejection fraction) [I50.23] Yes    Acute HFrEF (heart failure with reduced ejection fraction) [I50.21] Yes    Postoperative hematoma involving circulatory system following cardiac catheterization [I97.630] No    Acute blood loss anemia [D62] No    Rhinovirus infection [B34.8] Yes    CHF exacerbation [I50.9] Yes      Resolved Hospital Problems   No resolved problems to display.         Dyspnea multifactorial from bibasilar bacterial pneumonia, unspecified bacteria  Acute COPD exacerbation  Viral URI with rhinovirus  -CTA chest negative for PE but does show bilateral bibasilar pneumonia  -Finished up antibiotics with IV ceftriaxone and azithromycin  -RVP positive for rhinovirus  -Continue bronchodilators and finished steroids on   -Weaned down to room air and doing well     New onset acute HFrEF  NSTEMI  -Echo this admission shows severe LV systolic dysfunction with EF 20%  -Troponin elevation noted, could be type  II MI per cardiology  -Needs LHC performed, nephrology has cleared patient for LHC, plan for LHC today per cardiology  -Continue diuresis with Lasix, monitor I's and O's  -Continue GDMT as per cardiology  -Cardiology following     Leukocytosis  -Reactive from steroids, patient clinically improving  -Finish up antibiotics as above     NICCI  Hyponatremia  -Likely ATN in setting of contra exposure and diuresis per nephrology  -Creatinine slowly improving  -Avoid ACE inhibitor or ARB for now  -Monitor sodium with diuresis  -Nephrology following     Hypertension  -Continue on metoprolol and hydralazine  -Monitor blood pressure, adjust as needed     History of DVT/PE  -Patient previously on Eliquis however was not taking it due to lack of insurance but now has insurance and willing to take Eliquis  -CTA chest negative for PE  -Continue Eliquis     Acute blood loss anemia: Status post PRBC transfusion  Earlier.  Ultrasound right groin showed a large right groin hematoma with no evidence of pseudoaneurysm     Added Oxycodone as needed for pain     Hospital Course     History of Present Illness: Jen Cruz is a 53 y.o. female who presented to [facility] on 5/11/2024 complaining of progressive shortness of breath and wheezing with some productive cough for last few days.  Patient underwent workup in ER including CT chest PE protocol, revealing no pulmonary emboli, but did reveal multifocal pneumonia in both lung fields, her viral panel came out positive for rhinovirus.  Patient received some IV antibiotics in ER.  Patient also noted to significantly elevated proBNP and CT chest also revealing CHF pattern.  Did receive a dose of Lasix in ER.  Patient says she was in the [facility] from where she was transferred to [facility], patient was diagnosed with the DVT and PE and with some coagulation disorder for which she was put on Eliquis.  Patient also was noted to have cardiomyopathy with ejection  fraction around 25%.,  She denies having any cardiac cath or stress test done.  Patient's home medication list reveals she is on metoprolol, Aldactone  And diuretic therapy.  Following this we were asked to the patient for the further care     5/19/2024-Patient Reports is complaining of pain in the right groin   5/20/2024 patient seen and examined in bed no acute distress, vital signs stable, denies any chest pain or shortness of breath.  Discussed with RN. Will dc home, condition on dc stable.     DISCHARGE Follow Up Recommendations for labs and diagnostics: Follow-up with PCP in a week      Reasons For Change In Medications and Indications for New Medications:  START taking:  aspirin   Start taking on: May 21, 2024  budesonide-formoterol (Symbicort)   Combivent Respimat (ipratropium-albuterol)   hydrALAZINE (APRESOLINE)   hydrOXYzine (ATARAX)   naloxone (NARCAN)   neomycin-bacitracin-polymyxin b   nicotine (NICODERM CQ)   Start taking on: May 21, 2024  oxyCODONE (ROXICODONE)    STOP taking:  losartan 25 MG tablet (COZAAR)   spironolactone 25 MG tablet (ALDACTONE)     Day of Discharge     Vital Signs:  Temp:  [97.7 °F (36.5 °C)-99.1 °F (37.3 °C)] 98.6 °F (37 °C)  Heart Rate:  [] 91  Resp:  [14-21] 16  BP: (113-136)/(59-76) 136/71    Physical Exam               Constitutional: Awake, alert in no distress              Eyes: PERRLA, sclerae anicteric, no conjunctival injection              HENT: NCAT, mucous membranes moist              Neck: Supple, no thyromegaly, no lymphadenopathy, trachea midline              Respiratory: Clear to auscultation bilaterally, nonlabored respirations               Cardiovascular: RRR, no murmurs, rubs, or gallops, palpable pedal pulses bilaterally              Gastrointestinal: Positive bowel sounds, soft, nontender, nondistended              Musculoskeletal: No bilateral ankle edema, no clubbing or cyanosis to extremities              Psychiatric: Appropriate affect,  cooperative              Neurologic: Oriented x 3, strength symmetric in all extremities, Cranial Nerves grossly intact to confrontation, speech clear              Skin: No rashes   Pertinent  and/or Most Recent Results     LAB RESULTS:      Lab 05/20/24  0203 05/19/24  0636 05/18/24  0916 05/18/24  0202 05/17/24  1429 05/17/24  0100 05/16/24  0501   WBC 16.96* 14.98*  --  14.61* 14.61* 14.47* 19.88*   HEMOGLOBIN 8.5* 7.7* 7.4* 7.0* 8.3* 9.8* 10.2*   HEMATOCRIT 28.7* 26.0* 24.5* 23.7* 28.0* 33.4* 34.3   PLATELETS 325 249  --  249 351 346 383   NEUTROS ABS 12.15* 10.12*  --  10.82*  --  11.20* 15.91*   IMMATURE GRANS (ABS) 0.33* 0.33*  --  0.20*  --  0.21* 0.30*   LYMPHS ABS 2.69 2.85  --  2.35  --  1.84 2.07   MONOS ABS 1.46* 1.39*  --  1.11*  --  1.18* 1.57*   EOS ABS 0.28 0.26  --  0.12  --  0.01 0.00   MCV 79.5 79.5  --  78.2* 77.8* 77.9* 78.0*         Lab 05/20/24  0203 05/19/24  0636 05/18/24  0202 05/17/24  0100 05/16/24  0501 05/15/24  0837 05/14/24  0234   SODIUM 133* 134* 135* 137 137   < > 131*   POTASSIUM 3.7 4.7 4.7 4.4 4.2   < > 5.1   CHLORIDE 87* 92* 98 96* 96*   < > 91*   CO2 36.0* 35.0* 33.0* 31.0* 32.0*   < > 29.0   ANION GAP 10.0 7.0 4.0* 10.0 9.0   < > 11.0   BUN 24* 23* 28* 34* 33*   < > 28*   CREATININE 1.02* 1.02* 0.98 1.17* 0.91   < > 1.43*   EGFR 66.3 66.3 69.6 56.3* 76.1   < > 44.2*   GLUCOSE 137* 131* 118* 134* 147*   < > 206*   CALCIUM 9.2 9.0 8.4* 9.1 8.8   < > 9.1   IONIZED CALCIUM  --   --   --   --   --   --  1.26   MAGNESIUM 2.2 2.0 2.0 1.9 2.1   < > 2.0   PHOSPHORUS 4.5 4.4 3.5 2.3* 2.0*   < > 3.5    < > = values in this interval not displayed.         Lab 05/20/24  0203 05/19/24  0636 05/18/24  0202 05/17/24  0100 05/16/24  0501   TOTAL PROTEIN 7.6 6.4 5.5* 6.8 6.5   ALBUMIN 3.9 3.4* 2.9* 3.4* 3.3*   GLOBULIN 3.7 3.0 2.6 3.4 3.2   ALT (SGPT) 26 23 23 13 10   AST (SGOT) 29 25 34* 14 11   BILIRUBIN 1.1 0.7 0.4 0.4 0.4   ALK PHOS 115 101 103 113 125*                 Lab 05/18/24  0234    ABO TYPING B   RH TYPING Positive   ANTIBODY SCREEN Negative         Brief Urine Lab Results  (Last result in the past 365 days)        Color   Clarity   Blood   Leuk Est   Nitrite   Protein   CREAT   Urine HCG        05/13/24 1433 Yellow   Clear   Negative   Negative   Negative   Negative                 Microbiology Results (last 10 days)       Procedure Component Value - Date/Time    Blood Culture - Blood, Arm, Right [[id number]]  (Normal) Collected: 05/11/24 2147    Lab Status: Final result Specimen: Blood from Arm, Right Updated: 05/16/24 2200     Blood Culture No growth at 5 days    Blood Culture - Blood, Arm, Left [[id number]]  (Normal) Collected: 05/11/24 2147    Lab Status: Final result Specimen: Blood from Arm, Left Updated: 05/16/24 2200     Blood Culture No growth at 5 days    Respiratory Panel PCR w/COVID-19(SARS-CoV-2) MEHUL/FREDY/BEVERLY/PAD/COR/TRINITY In-House, NP Swab in UTM/VTM, 2 HR TAT - Swab, Nasopharynx [[id number]]  (Abnormal) Collected: 05/11/24 2056    Lab Status: Final result Specimen: Swab from Nasopharynx Updated: 05/11/24 2216     ADENOVIRUS, PCR Not Detected     Coronavirus 229E Not Detected     Coronavirus HKU1 Not Detected     Coronavirus NL63 Not Detected     Coronavirus OC43 Not Detected     COVID19 Not Detected     Human Metapneumovirus Not Detected     Human Rhinovirus/Enterovirus Detected     Influenza A PCR Not Detected     Influenza B PCR Not Detected     Parainfluenza Virus 1 Not Detected     Parainfluenza Virus 2 Not Detected     Parainfluenza Virus 3 Not Detected     Parainfluenza Virus 4 Not Detected     RSV, PCR Not Detected     Bordetella pertussis pcr Not Detected     Bordetella parapertussis PCR Not Detected     Chlamydophila pneumoniae PCR Not Detected     Mycoplasma pneumo by PCR Not Detected    Narrative:      In the setting of a positive respiratory panel with a viral infection PLUS a negative procalcitonin without other underlying concern for bacterial infection, consider  observing off antibiotics or discontinuation of antibiotics and continue supportive care. If the respiratory panel is positive for atypical bacterial infection (Bordetella pertussis, Chlamydophila pneumoniae, or Mycoplasma pneumoniae), consider antibiotic de-escalation to target atypical bacterial infection.            CT Abdomen Pelvis With & Without Contrast    Result Date: 5/17/2024  Impression: Impression: 1.There is a large intermediate density right groin hematoma which extends superiorly within the lateral soft tissues of the right pelvis. This measures 25 x 11 x 6 cm. 2.No retroperitoneal hematoma is seen. 3.Small bilateral pleural effusions, mild pelvic ascites, and superficial soft tissue anasarca noted. 4.Additional findings as detailed above. Electronically Signed: Nicolas Eden MD  5/17/2024 3:18 PM EDT  Workstation ID: [id number]    US Renal Bilateral    Result Date: 5/13/2024  Impression: Impression: Unremarkable renal ultrasound. Electronically Signed: Adriana Castanon MD  5/13/2024 3:40 PM EDT  Workstation ID: [id number]    CT Angiogram Chest Pulmonary Embolism    Result Date: 5/11/2024  Impression: Impression: 1. No evidence of pulmonary embolism. 2. Bibasilar pneumonia with focal consolidative changes present within the inferior left upper lobe. Follow-up to resolution recommended given the masslike appearance. 3. Mild to moderate underlying pulmonary edema pattern with small right-sided pleural effusion and small to moderate size left-sided pleural effusion. There is cardiomegaly. Anasarca of the soft tissues present. 4. Ancillary findings as described above. Electronically Signed: Sarah Good MD  5/11/2024 10:10 PM EDT  Workstation ID: [id number]    XR Chest 1 View    Result Date: 5/11/2024  Impression: Impression: Bibasilar pneumonia with probable mild underlying pulmonary edema pattern and small bilateral pleural effusions. There is mild cardiomegaly.. Electronically Signed: Sarah Good MD   5/11/2024 9:03 PM EDT  Workstation ID: [id number]     Results for orders placed during the hospital encounter of 05/11/24    Duplex Pseudoaneurysm CAR    Interpretation Summary    Large right groin hematoma superficial to the femoral vessels, but no apparent pseudoaneurysm or arteriovenous fistula.      Results for orders placed during the hospital encounter of 05/11/24    Duplex Pseudoaneurysm CAR    Interpretation Summary    Large right groin hematoma superficial to the femoral vessels, but no apparent pseudoaneurysm or arteriovenous fistula.      Results for orders placed during the hospital encounter of 05/11/24    Adult Transthoracic Echo Complete w/ Color, Spectral and Contrast if Necessary Per Protocol    Interpretation Summary    Left ventricular systolic function is severely decreased. Calculated left ventricular 3D EF = 18% Left ventricular ejection fraction appears to be less than 20%.    Left ventricular diastolic function is consistent with (grade II w/high LAP) pseudonormalization.    Severely reduced right ventricular systolic function noted.    The right ventricular cavity is moderately dilated.    The left atrial cavity is moderate to severely dilated.    The right atrial cavity is moderate to severely  dilated.    Moderate tricuspid valve regurgitation is present.    Estimated right ventricular systolic pressure from tricuspid regurgitation is moderately elevated (45-55 mmHg).    Moderate to severe pulmonary hypertension is present.      Labs Pending at Discharge:      Procedures Performed  Procedure(s):  Coronary angiography  Left Heart Cath  Left ventriculography         Consults:   Consults       Date and Time Order Name Status Description    5/18/2024 11:35 AM Inpatient Vascular Surgery Consult Completed     5/13/2024 11:06 AM Inpatient Nephrology Consult Completed     5/12/2024 12:53 AM Inpatient Cardiology Consult Completed     5/11/2024 10:14 PM Hospitalist (on-call MD unless specified)               Assessment & Plan     Impressions:  Heart failure reduced ejection fraction  Nonischemic cardiomyopathy EF 20% this admission  Non-STEMI likely secondary to demand ischemia  Hypertension  Acute blood loss anemia status posttransfusion     Recommendations:  Cardiac catheterization with no evidence of epicardial occlusive disease  Postprocedural bleeding/hematoma remained stable and is being followed by vascular surgery  Monitor rate and rhythm  Continue with long-acting beta-blocker  Patient is on angiotensin receptor blocker at home but ideally would benefit from Entresto when/if okay with nephrology  LifeVest pending  Will hold on resumption of Eliquis and defer timing of resumption to primary cardiologist.  Further recommendations as patient's course progresses.         ASSESSMENT / PLAN       Acute on chronic HFrEF (heart failure with reduced ejection fraction)    CHF exacerbation    Rhinovirus infection    Postoperative hematoma involving circulatory system following cardiac catheterization    Acute blood loss anemia     ARF/NICCI--------Nonoliguric. Better. Follow with ongoing diuretic exposure     2. ANEMIA------H/H stable. Follow for further PRBC need     3. CAD S/P NSTEMI S/P CARDIAC CATH S/P GROIN HEMATOMA-----per Cardiology,      4. BENIGN ESSENTIAL HTN-----BP ok.     5. CHF WITH EF OF 20%------Cautious diuretic exposure. Clinically stable on current po Lasix dose. Give po Diamox x one and add scheduled po Kdur     6. H/O HYPERCOAGULABLE STATE     7. HYPONATREMIA------Better with diuresis and po fluid restriction     8. HYPOALBUMINEMIA     9. RHINOVIRUS INFECTION        Helder Chong MD  [facility]  [phone]  05/20/24  sessment & Plan  Assessment / Plan     Acute on chronic HFrEF (heart failure with reduced ejection fraction)    CHF exacerbation    Rhinovirus infection    Postoperative hematoma involving circulatory system following cardiac  catheterization    Acute blood loss anemia     53 y.o. female with postcatheterization right groin hematoma   Anemia, H&H improving, 8.5/28.7, CTM  Leukocytosis, 16.96, oksana afebrile, CTM  Right groin remains with soft swelling and large ecchymosis, no skin threat noted  Okay from a vascular surgery standpoint to restart anticoagulation today  She has no indication for acute vascular surgery intervention  We will sign off but be available if needed  Please call for any questions or concerns              KULDIP Urbano  [facility] Vascular Surgery  05/20/24     Discharge Details        Discharge Medications        New Medications        Instructions Start Date   aspirin 81 MG EC tablet   81 mg, Oral, Daily   Start Date: May 21, 2024     budesonide-formoterol 160-4.5 MCG/ACT inhaler  Commonly known as: Symbicort   2 puffs, Inhalation, 2 Times Daily - RT      Combivent Respimat  MCG/ACT inhaler  Generic drug: ipratropium-albuterol   1 puff, Inhalation, 4 Times Daily PRN      hydrALAZINE 25 MG tablet  Commonly known as: APRESOLINE   25 mg, Oral, Every 12 Hours Scheduled      hydrOXYzine 25 MG tablet  Commonly known as: ATARAX   25 mg, Oral, 3 Times Daily PRN      naloxone 4 MG/0.1ML nasal spray  Commonly known as: NARCAN   Call 911. Don't prime. Welling in 1 nostril for overdose. Repeat in 2-3 minutes in other nostril if no or minimal breathing/responsiveness.      neomycin-bacitracin-polymyxin b 3.5-400-5000 ointment ointment   1 Application, Topical, 2 Times Daily PRN      nicotine 21 MG/24HR patch  Commonly known as: NICODERM CQ   1 patch, Transdermal, Every 24 Hours Scheduled   Start Date: May 21, 2024     oxyCODONE 5 MG immediate release tablet  Commonly known as: ROXICODONE   5 mg, Oral, Every 4 Hours PRN             Continue These Medications        Instructions Start Date   apixaban 5 MG tablet tablet  Commonly known as: ELIQUIS   5 mg, Oral, 2 Times Daily      furosemide 40 MG tablet  Commonly known as:  LASIX   40 mg, Oral, Daily      metoprolol succinate XL 25 MG 24 hr tablet  Commonly known as: TOPROL-XL   25 mg, Oral, Daily             Stop These Medications      losartan 25 MG tablet  Commonly known as: COZAAR     spironolactone 25 MG tablet  Commonly known as: ALDACTONE              Allergies   Allergen Reactions    Heparin Rash     Possible rash from heparin use.    Wasp Venom Shortness Of Breath         Discharge Disposition:   Home or Self Care    Diet:  Hospital:  Diet Order   Procedures    Diet: Regular/House; Fluid Consistency: Thin (IDDSI 0)         Discharge Activity:   Activity Instructions       Gradually Increase Activity Until at Pre-Hospitalization Level                CODE STATUS:  Code Status and Medical Interventions:   Ordered at: 05/11/24 2240     Code Status (Patient has no pulse and is not breathing):    CPR (Attempt to Resuscitate)     Medical Interventions (Patient has pulse or is breathing):    Full Support         Future Appointments   Date Time Provider Department Center   5/22/2024  9:00 AM Beltran Barker APRN [facility] None       Additional Instructions for the Follow-ups that You Need to Schedule       Discharge Follow-up with PCP   As directed       Currently Documented PCP:    Provider, No Known    PCP Phone Number:    None     Follow Up Details: 1 week        Discharge Follow-up with Specified Provider: cardiology; 2 Weeks   As directed      To: cardiology   Follow Up: 2 Weeks        Basic Metabolic Panel    May 27, 2024 (Approximate)      Release to patient: Routine Release                Time spent on Discharge including face to face service:  >30 minutes    Signature: Electronically signed by Ronnie Terry MD, 05/20/24, 12:54 EDT.  [facility] Hospitalist Team

## 2024-05-20 NOTE — PROGRESS NOTES
"NEPHROLOGY PROGRESS NOTE------[facility]    [facility]  [phone]  Helder Chong MD      Patient Care Team:  Provider, No Known as PCP - Albert Russo MD as Consulting Physician (Nephrology)      Provider:  Helder Chong MD  Patient Name: Jen Cruz  :  1970    SUBJECTIVE:    F/U ARF/NICCI/CRF/CKD    Anxious to be d/c. Breathing ok. Groin pain better. No dysuria. Appetite ok    Medication:  aspirin, 81 mg, Oral, Daily  furosemide, 40 mg, Oral, TID  hydrALAZINE, 25 mg, Oral, Q12H  ipratropium-albuterol, 3 mL, Nebulization, 4x Daily - RT  Lidocaine, 1 patch, Transdermal, Q24H  metoprolol succinate XL, 25 mg, Oral, Q12H  nicotine, 1 patch, Transdermal, Q24H  sodium chloride, 10 mL, Intravenous, Q12H           OBJECTIVE    Vital Sign Min/Max for last 24 hours  Temp  Min: 97.7 °F (36.5 °C)  Max: 99.1 °F (37.3 °C)   BP  Min: 125/62  Max: 135/60   Pulse  Min: 81  Max: 96   Resp  Min: 14  Max: 21   SpO2  Min: 94 %  Max: 100 %   No data recorded   Weight  Min: 89.2 kg (196 lb 10.4 oz)  Max: 89.2 kg (196 lb 10.4 oz)     Flowsheet Rows      Flowsheet Row First Filed Value   Admission Height 165.1 cm (65\") Documented at 2024   Admission Weight 95.3 kg (210 lb) Documented at 2024            No intake/output data recorded.  I/O last 3 completed shifts:  In: 600 [P.O.:600]  Out: 1000 [Urine:1000]    Physical Exam:  General Appearance: alert, appears stated age and cooperative  Head: normocephalic, without obvious abnormality and atraumatic  Eyes: conjunctivae and sclerae normal and no icterus  Neck: supple and +MINIMAL JVD  Lungs: scattered rhonchi  Heart: regular rhythm & normal rate and normal S1, S2 +GALLO  Chest: Wall no abnormalities observed  Abdomen: normal bowel sounds and soft, nontender  Extremities: moves extremities well,  TRACE/1+ BILAT LE EDEMA, no cyanosis and no redness  Skin: no bleeding, bruising " "or rash, turgor normal, color normal and no lesions noted  Neurologic: Alert, and oriented. No focal deficits    Labs:    WBC WBC   Date Value Ref Range Status   05/20/2024 16.96 (H) 3.40 - 10.80 10*3/mm3 Final   05/19/2024 14.98 (H) 3.40 - 10.80 10*3/mm3 Final   05/18/2024 14.61 (H) 3.40 - 10.80 10*3/mm3 Final   05/17/2024 14.61 (H) 3.40 - 10.80 10*3/mm3 Final      HGB Hemoglobin   Date Value Ref Range Status   05/20/2024 8.5 (L) 12.0 - 15.9 g/dL Final   05/19/2024 7.7 (L) 12.0 - 15.9 g/dL Final   05/18/2024 7.4 (L) 12.0 - 15.9 g/dL Final   05/18/2024 7.0 (L) 12.0 - 15.9 g/dL Final   05/17/2024 8.3 (L) 12.0 - 15.9 g/dL Final      HCT Hematocrit   Date Value Ref Range Status   05/20/2024 28.7 (L) 34.0 - 46.6 % Final   05/19/2024 26.0 (L) 34.0 - 46.6 % Final   05/18/2024 24.5 (L) 34.0 - 46.6 % Final   05/18/2024 23.7 (L) 34.0 - 46.6 % Final   05/17/2024 28.0 (L) 34.0 - 46.6 % Final      Platelets No results found for: \"LABPLAT\"   MCV MCV   Date Value Ref Range Status   05/20/2024 79.5 79.0 - 97.0 fL Final   05/19/2024 79.5 79.0 - 97.0 fL Final   05/18/2024 78.2 (L) 79.0 - 97.0 fL Final   05/17/2024 77.8 (L) 79.0 - 97.0 fL Final          Sodium Sodium   Date Value Ref Range Status   05/20/2024 133 (L) 136 - 145 mmol/L Final   05/19/2024 134 (L) 136 - 145 mmol/L Final   05/18/2024 135 (L) 136 - 145 mmol/L Final      Potassium Potassium   Date Value Ref Range Status   05/20/2024 3.7 3.5 - 5.2 mmol/L Final   05/19/2024 4.7 3.5 - 5.2 mmol/L Final   05/18/2024 4.7 3.5 - 5.2 mmol/L Final     Comment:     Slight hemolysis detected by analyzer. Result may be falsely elevated.      Chloride Chloride   Date Value Ref Range Status   05/20/2024 87 (L) 98 - 107 mmol/L Final   05/19/2024 92 (L) 98 - 107 mmol/L Final   05/18/2024 98 98 - 107 mmol/L Final      CO2 CO2   Date Value Ref Range Status   05/20/2024 36.0 (H) 22.0 - 29.0 mmol/L Final   05/19/2024 35.0 (H) 22.0 - 29.0 mmol/L Final   05/18/2024 33.0 (H) 22.0 - 29.0 mmol/L " "Final      BUN BUN   Date Value Ref Range Status   05/20/2024 24 (H) 6 - 20 mg/dL Final   05/19/2024 23 (H) 6 - 20 mg/dL Final   05/18/2024 28 (H) 6 - 20 mg/dL Final      Creatinine Creatinine   Date Value Ref Range Status   05/20/2024 1.02 (H) 0.57 - 1.00 mg/dL Final   05/19/2024 1.02 (H) 0.57 - 1.00 mg/dL Final   05/18/2024 0.98 0.57 - 1.00 mg/dL Final      Calcium Calcium   Date Value Ref Range Status   05/20/2024 9.2 8.6 - 10.5 mg/dL Final   05/19/2024 9.0 8.6 - 10.5 mg/dL Final   05/18/2024 8.4 (L) 8.6 - 10.5 mg/dL Final      PO4 No components found for: \"PO4\"   Albumin Albumin   Date Value Ref Range Status   05/20/2024 3.9 3.5 - 5.2 g/dL Final   05/19/2024 3.4 (L) 3.5 - 5.2 g/dL Final   05/18/2024 2.9 (L) 3.5 - 5.2 g/dL Final      Magnesium Magnesium   Date Value Ref Range Status   05/20/2024 2.2 1.6 - 2.6 mg/dL Final   05/19/2024 2.0 1.6 - 2.6 mg/dL Final   05/18/2024 2.0 1.6 - 2.6 mg/dL Final      Uric Acid No components found for: \"URIC ACID\"     Imaging Results (Last 72 Hours)       Procedure Component Value Units Date/Time    CT Abdomen Pelvis With & Without Contrast [[id number]] Collected: 05/17/24 1509     Updated: 05/17/24 1521    Narrative:      CT ABDOMEN PELVIS W WO CONTRAST    Date of Exam: 5/17/2024 3:04 PM EDT    Indication: ? restroperitoneal bleed.    Comparison: None available.    Technique: Axial CT images were obtained of the abdomen and pelvis before and after the uneventful intravenous administration of iodinated contrast. Sagittal and coronal reconstructions were performed.  Automated exposure control and iterative   reconstruction methods were used.    Findings:    Liver: The liver is unremarkable in morphology. No focal liver lesion is seen. No biliary dilation is seen.    Gallbladder: Surgically absent.    Pancreas: Unremarkable.    Spleen: Unremarkable.    Adrenal glands: Low-density prominence/nodularity of the left adrenal gland. Right adrenal gland is unremarkable.    Genitourinary " tract: Both kidneys demonstrate lobulated contours. Kidneys are otherwise unremarkable. No hydronephrosis is seen. The visualized portions of the ureters and urinary bladder appear unremarkable. Pelvic organs demonstrate no acute   abnormality.    Gastrointestinal tract: The visualized hollow viscera demonstrate no focal lesion. There is no evidence of bowel obstruction.    Appendix: No findings to suggest acute appendicitis.    Other findings: Mild low-density pelvic free fluid is seen. No free air is identified. No pathologically enlarged lymph nodes are seen. Mild vascular calcifications are present. The IVC is grossly unremarkable. No retroperitoneal hematoma is seen.    Bones and soft tissues: No acute osseous lesion is identified. There is a large intermediate density right groin hematoma which extends superiorly within the lateral soft tissues of the right pelvis, measuring up to 25 in length and 11 x 6 cm in   cross-sectional dimension superficial soft tissue anasarca is present.    Lung bases: Cardiomegaly with small bilateral pleural effusions and bibasilar atelectasis. Calcified granuloma within the right lung base.      Impression:      Impression:  1.There is a large intermediate density right groin hematoma which extends superiorly within the lateral soft tissues of the right pelvis. This measures 25 x 11 x 6 cm.  2.No retroperitoneal hematoma is seen.  3.Small bilateral pleural effusions, mild pelvic ascites, and superficial soft tissue anasarca noted.  4.Additional findings as detailed above.      Electronically Signed: Nicolas Eden MD    5/17/2024 3:18 PM EDT    Workstation ID: [id number]            Results for orders placed during the hospital encounter of 05/11/24    XR Chest 1 View    Narrative  XR CHEST 1 VW    Date of Exam: 5/11/2024 9:01 PM EDT    Indication: chest pain    Comparison: None available.    Findings:  The heart appears enlarged. Patchy airspace disease is seen within the  bilateral lower lobes in the perihilar region. Probable small bilateral pleural effusions are present. Chronic appearing interstitial changes are present with indistinctness of the  pulmonary vasculature.. No acute osseous abnormality identified.    Impression  Impression:  Bibasilar pneumonia with probable mild underlying pulmonary edema pattern and small bilateral pleural effusions. There is mild cardiomegaly..      Electronically Signed: Sarah Good MD  5/11/2024 9:03 PM EDT  Workstation ID: [id number]      Results for orders placed during the hospital encounter of 05/11/24    Duplex Pseudoaneurysm CAR    Interpretation Summary    Large right groin hematoma superficial to the femoral vessels, but no apparent pseudoaneurysm or arteriovenous fistula.        ASSESSMENT / PLAN      Acute on chronic HFrEF (heart failure with reduced ejection fraction)    CHF exacerbation    Rhinovirus infection    Postoperative hematoma involving circulatory system following cardiac catheterization    Acute blood loss anemia    ARF/NICCI--------Nonoliguric. Better. Follow with ongoing diuretic exposure    2. ANEMIA------H/H stable. Follow for further PRBC need    3. CAD S/P NSTEMI S/P CARDIAC CATH S/P GROIN HEMATOMA-----per Cardiology,     4. BENIGN ESSENTIAL HTN-----BP ok.    5. CHF WITH EF OF 20%------Cautious diuretic exposure. Clinically stable on current po Lasix dose. Give po Diamox x one and add scheduled po Kdur    6. H/O HYPERCOAGULABLE STATE    7. HYPONATREMIA------Better with diuresis and po fluid restriction    8. HYPOALBUMINEMIA    9. RHINOVIRUS INFECTION      Helder Chong MD  [facility]  [phone]  05/20/24  06:26 EDT

## 2024-05-20 NOTE — PROGRESS NOTES
CARDIOLOGY PROGRESS NOTE:    Jen Cruz  53 y.o.  female  1970  [id number]      Referring Provider: Hospitalist    Reason for follow-up: Shortness of breath and HFrEF     Patient Care Team:  Provider, No Known as PCP - Albert Russo MD as Consulting Physician (Nephrology)    Subjective .  Doing well without any symptoms today    Objective l sitting in chair comfortably     Review of Systems   Constitutional: Negative for fever and malaise/fatigue.   HENT:  Negative for ear pain and nosebleeds.    Eyes:  Negative for blurred vision and double vision.   Cardiovascular:  Negative for chest pain, dyspnea on exertion and palpitations.   Respiratory:  Negative for cough and shortness of breath.    Skin:  Negative for rash.   Musculoskeletal:  Negative for joint pain.   Gastrointestinal:  Negative for abdominal pain, nausea and vomiting.   Neurological:  Negative for focal weakness and headaches.   Psychiatric/Behavioral:  Negative for depression. The patient is not nervous/anxious.    All other systems reviewed and are negative.      Allergies: Heparin and Wasp venom    Scheduled Meds:aspirin, 81 mg, Oral, Daily  furosemide, 40 mg, Oral, TID  hydrALAZINE, 25 mg, Oral, Q12H  ipratropium-albuterol, 3 mL, Nebulization, 4x Daily - RT  Lidocaine, 1 patch, Transdermal, Q24H  metoprolol succinate XL, 25 mg, Oral, Q12H  nicotine, 1 patch, Transdermal, Q24H  potassium chloride, 20 mEq, Oral, BID With Meals  sodium chloride, 10 mL, Intravenous, Q12H      Continuous Infusions:   PRN Meds:.  acetaminophen    atropine    benzonatate    senna-docusate sodium **AND** polyethylene glycol **AND** bisacodyl **AND** bisacodyl    Calcium Replacement - Follow Nurse / BPA Driven Protocol    fentaNYL citrate (PF)    HYDROcodone-acetaminophen    hydrOXYzine    ipratropium-albuterol    Magnesium Low Dose Replacement - Follow Nurse / BPA Driven Protocol    Morphine    naloxone    neomycin-bacitracin-polymyxin b     "nitroglycerin    oxyCODONE    Phosphorus Replacement - Follow Nurse / BPA Driven Protocol    Potassium Replacement - Follow Nurse / BPA Driven Protocol    [COMPLETED] Insert Peripheral IV **AND** sodium chloride    sodium chloride    sodium chloride    sodium chloride        VITAL SIGNS  Vitals:    05/20/24 1023 05/20/24 1100 05/20/24 1211 05/20/24 1215   BP: 113/60 136/71     BP Location: Left arm Left arm     Patient Position: Sitting Lying     Pulse: 90 109 87 91   Resp: 16 18 16 16   Temp:  98.6 °F (37 °C)     TempSrc:  Oral     SpO2: 100%      Weight:       Height:           Flowsheet Rows      Flowsheet Row First Filed Value   Admission Height 165.1 cm (65\") Documented at 05/11/2024 2019   Admission Weight 95.3 kg (210 lb) Documented at 05/11/2024 2019             TELEMETRY: Sinus rhythm    Physical Exam:  Constitutional:       Appearance: Well-developed.   Eyes:      General: No scleral icterus.     Conjunctiva/sclera: Conjunctivae normal.      Pupils: Pupils are equal, round, and reactive to light.   HENT:      Head: Normocephalic and atraumatic.   Neck:      Vascular: No carotid bruit or JVD.   Pulmonary:      Effort: Pulmonary effort is normal.      Breath sounds: Normal breath sounds. No wheezing. No rales.   Cardiovascular:      Normal rate. Regular rhythm.   Pulses:     Intact distal pulses.   Abdominal:      General: Bowel sounds are normal.      Palpations: Abdomen is soft.   Musculoskeletal: Normal range of motion.      Cervical back: Normal range of motion and neck supple. Skin:     General: Skin is warm and dry.      Findings: No rash.   Neurological:      Mental Status: Alert.      Comments: No focal deficits          Results Review:   I reviewed the patient's new clinical results.  Lab Results (last 24 hours)       Procedure Component Value Units Date/Time    Magnesium [[id number]]  (Normal) Collected: 05/20/24 0203    Specimen: Blood from Arm, Right Updated: 05/20/24 0233     Magnesium 2.2 mg/dL  "    Comprehensive Metabolic Panel [[id number]]  (Abnormal) Collected: 05/20/24 0203    Specimen: Blood from Arm, Right Updated: 05/20/24 0233     Glucose 137 mg/dL      BUN 24 mg/dL      Creatinine 1.02 mg/dL      Sodium 133 mmol/L      Potassium 3.7 mmol/L      Chloride 87 mmol/L      CO2 36.0 mmol/L      Calcium 9.2 mg/dL      Total Protein 7.6 g/dL      Albumin 3.9 g/dL      ALT (SGPT) 26 U/L      AST (SGOT) 29 U/L      Alkaline Phosphatase 115 U/L      Total Bilirubin 1.1 mg/dL      Globulin 3.7 gm/dL      A/G Ratio 1.1 g/dL      BUN/Creatinine Ratio 23.5     Anion Gap 10.0 mmol/L      eGFR 66.3 mL/min/1.73     Narrative:      GFR Normal >60  Chronic Kidney Disease <60  Kidney Failure <15      Phosphorus [[id number]]  (Normal) Collected: 05/20/24 0203    Specimen: Blood from Arm, Right Updated: 05/20/24 0233     Phosphorus 4.5 mg/dL     CBC & Differential [[id number]]  (Abnormal) Collected: 05/20/24 0203    Specimen: Blood from Arm, Right Updated: 05/20/24 0209    Narrative:      The following orders were created for panel order CBC & Differential.  Procedure                               Abnormality         Status                     ---------                               -----------         ------                     CBC Auto Differential[[id number]]        Abnormal            Final result                 Please view results for these tests on the individual orders.    CBC Auto Differential [[id number]]  (Abnormal) Collected: 05/20/24 0203    Specimen: Blood from Arm, Right Updated: 05/20/24 0209     WBC 16.96 10*3/mm3      RBC 3.61 10*6/mm3      Hemoglobin 8.5 g/dL      Hematocrit 28.7 %      MCV 79.5 fL      MCH 23.5 pg      MCHC 29.6 g/dL      RDW 18.8 %      RDW-SD 52.1 fl      MPV 9.5 fL      Platelets 325 10*3/mm3      Neutrophil % 71.6 %      Lymphocyte % 15.9 %      Monocyte % 8.6 %      Eosinophil % 1.7 %      Basophil % 0.3 %      Immature Grans % 1.9 %      Neutrophils, Absolute 12.15 10*3/mm3       Lymphocytes, Absolute 2.69 10*3/mm3      Monocytes, Absolute 1.46 10*3/mm3      Eosinophils, Absolute 0.28 10*3/mm3      Basophils, Absolute 0.05 10*3/mm3      Immature Grans, Absolute 0.33 10*3/mm3      nRBC 0.0 /100 WBC             Imaging Results (Last 24 Hours)       ** No results found for the last 24 hours. **            EKG      I personally viewed and interpreted the patient's EKG/Telemetry data:    ECHOCARDIOGRAM:  Results for orders placed during the hospital encounter of 05/11/24    Adult Transthoracic Echo Complete w/ Color, Spectral and Contrast if Necessary Per Protocol    Interpretation Summary    Left ventricular systolic function is severely decreased. Calculated left ventricular 3D EF = 18% Left ventricular ejection fraction appears to be less than 20%.    Left ventricular diastolic function is consistent with (grade II w/high LAP) pseudonormalization.    Severely reduced right ventricular systolic function noted.    The right ventricular cavity is moderately dilated.    The left atrial cavity is moderate to severely dilated.    The right atrial cavity is moderate to severely  dilated.    Moderate tricuspid valve regurgitation is present.    Estimated right ventricular systolic pressure from tricuspid regurgitation is moderately elevated (45-55 mmHg).    Moderate to severe pulmonary hypertension is present.       STRESS MYOVIEW:       CARDIAC CATHETERIZATION:  No results found for this or any previous visit.       OTHER:         Assessment & Plan     HFrEF  Patient has congestive heart failure LV systolic dysfunction EF of 20%  Patient is not on any medicines  Patient will be started on low-dose beta-blockers but cannot have ACE inhibitor's right now because of renal insufficiency  Patient also on IV Lasix but will monitor her renal function  Patient will have cardiac catheterization to rule out coronary artery disease  Patient is placed on hydralazine and the blood pressure and heart rate are  stable and is tolerating well.  Patient had a cardiac catheterization which showed normal coronaries but had significant LV dysfunction     Non-STEMI  Patient had elevated troponin but could be type II MI but will need a cardiac evaluation because of her new diagnosis of heart failure  Patient is on aspirin and will check her lipid levels and also placed on statins.  Patient will have cardiac authorization performed.  Discussed with patient about procedure risks and benefits.  Patient still has high white counts but the renal function stable and will perform cardiac catheterization when she is more stable.  Nephrologist cleared the patient for cardiac catheterization and she is also feeling better and hence I will perform a cardiac intervention the morning.  Will hold the Eliquis.  Patient had cardiac catheterization which showed normal coronaries but had significant LV dysfunction  Continue medical therapy.    Right groin hematoma  Patient had large right groin hematoma post cath.  Patient was given blood transfusion because her hemoglobin was slightly low  Patient had a CT scan which did not show any intra-abdominal or retroperitoneal hematoma but had large hematoma in the muscle.  Patient is ambulating very well.       History of DVT and pulmonary embolism  Patient was admitted in [facility] for the same and was on Eliquis but did not take it regularly because of lack of insurance.  She will take her Eliquis now because she has insurance.  Patient had an echocardiogram which showed severe right ventricular systolic dysfunction also.  Patient was on Eliquis which was held because of the hematoma and it could be resumed today and will follow her as an outpatient     Acute respiratory failure  Patient has shortness of breath or viral pneumonia with rhinovirus but also had recent PE with right ventricular strain and dysfunction  Patient is on oxygen and her proBNP is also elevated and is on Lasix  Patient also  on antibiotics for pneumonia.  White counts are still high.     Hypertension  Patient's blood pressure is currently stable on beta-blockers and losartan  Her losartan has been stopped because of renal insufficiency and will place on hydralazine.    Renal insufficiency  Patient has acute on chronic renal insufficiency and is followed by the nephrologist.  Patient is feeling better on Lasix but no ACE inhibitor's.  Patient's renal function is normal    I discussed the patients findings and my recommendations with patient and nurse    Rob Garrido MD  05/20/24  13:50 EDT

## 2024-05-20 NOTE — PROGRESS NOTES
[facility] Vascular Surgery Progress Note    Name: Jen Cruz ADMIT: 2024   : 1970  PCP: Provider, No Known    MRN: [id number] LOS: 7 days   AGE/SEX: 53 y.o. female  ROOM:    [facility]    CC: postcatheterization right groin hematoma.     Subjective     Patient resting in bed.  Reports some soreness to the right groin, stable.    Objective     Scheduled Medications:   acetaZOLAMIDE, 250 mg, Oral, Once  aspirin, 81 mg, Oral, Daily  furosemide, 40 mg, Oral, TID  hydrALAZINE, 25 mg, Oral, Q12H  ipratropium-albuterol, 3 mL, Nebulization, 4x Daily - RT  Lidocaine, 1 patch, Transdermal, Q24H  metoprolol succinate XL, 25 mg, Oral, Q12H  nicotine, 1 patch, Transdermal, Q24H  potassium chloride, 20 mEq, Oral, BID With Meals  potassium chloride, 40 mEq, Oral, Once  sodium chloride, 10 mL, Intravenous, Q12H        Active Infusions:       As Needed Medications:    acetaminophen    atropine    benzonatate    senna-docusate sodium **AND** polyethylene glycol **AND** bisacodyl **AND** bisacodyl    Calcium Replacement - Follow Nurse / BPA Driven Protocol    fentaNYL citrate (PF)    HYDROcodone-acetaminophen    hydrOXYzine    ipratropium-albuterol    Magnesium Low Dose Replacement - Follow Nurse / BPA Driven Protocol    Morphine    naloxone    neomycin-bacitracin-polymyxin b    nitroglycerin    oxyCODONE    Phosphorus Replacement - Follow Nurse / BPA Driven Protocol    Potassium Replacement - Follow Nurse / BPA Driven Protocol    [COMPLETED] Insert Peripheral IV **AND** sodium chloride    sodium chloride    sodium chloride    sodium chloride    traMADol    Vital Signs  Vitals:    24 0748   BP:    Pulse: 106   Resp: 16   Temp:    SpO2:       Body mass index is 32.72 kg/m².     Physical Exam:  NAD, alert and oriented  RRR  Lungs clear  Abd soft, benign  Right groin with significant ecchymosis, soft swelling.  No signs of skin breakdown.    Results Review:     CBC    Results from last 7 days   Lab Units  05/20/24  0203 05/19/24  0636 05/18/24  0916 05/18/24  0202 05/17/24  1429 05/17/24  0100 05/16/24  0501 05/15/24  0837   WBC 10*3/mm3 16.96* 14.98*  --  14.61* 14.61* 14.47* 19.88* 16.56*   HEMOGLOBIN g/dL 8.5* 7.7* 7.4* 7.0* 8.3* 9.8* 10.2* 9.9*   PLATELETS 10*3/mm3 325 249  --  249 351 346 383 380     BMP   Results from last 7 days   Lab Units 05/20/24  0203 05/19/24  0636 05/18/24  0202 05/17/24  0100 05/16/24  0501 05/15/24  0837 05/14/24  0234   SODIUM mmol/L 133* 134* 135* 137 137 133* 131*   POTASSIUM mmol/L 3.7 4.7 4.7 4.4 4.2 4.8 5.1   CHLORIDE mmol/L 87* 92* 98 96* 96* 92* 91*   CO2 mmol/L 36.0* 35.0* 33.0* 31.0* 32.0* 31.0* 29.0   BUN mg/dL 24* 23* 28* 34* 33* 39* 28*   CREATININE mg/dL 1.02* 1.02* 0.98 1.17* 0.91 1.19* 1.43*   GLUCOSE mg/dL 137* 131* 118* 134* 147* 201* 206*   MAGNESIUM mg/dL 2.2 2.0 2.0 1.9 2.1 2.1 2.0   PHOSPHORUS mg/dL 4.5 4.4 3.5 2.3* 2.0* 3.4 3.5     Infection     Radiology(recent) No radiology results for the last day    DVT prophylaxis:  Medical DVT prophylaxis orders are present.         Problems:    Active Hospital Problems:  Active Hospital Problems    Diagnosis  POA    **Acute on chronic HFrEF (heart failure with reduced ejection fraction) [I50.23]  Yes    Postoperative hematoma involving circulatory system following cardiac catheterization [I97.630]  No    Acute blood loss anemia [D62]  No    Rhinovirus infection [B34.8]  Yes    CHF exacerbation [I50.9]  Yes      Resolved Hospital Problems   No resolved problems to display.        Assessment & Plan   Assessment / Plan     Acute on chronic HFrEF (heart failure with reduced ejection fraction)    CHF exacerbation    Rhinovirus infection    Postoperative hematoma involving circulatory system following cardiac catheterization    Acute blood loss anemia    53 y.o. female with postcatheterization right groin hematoma   Anemia, H&H improving, 8.5/28.7, CTM  Leukocytosis, 16.96, oksana afebrile, CTM  Right groin remains with soft  swelling and large ecchymosis, no skin threat noted  Okay from a vascular surgery standpoint to restart anticoagulation today  She has no indication for acute vascular surgery intervention  We will sign off but be available if needed  Please call for any questions or concerns          KULDIP Urbano  [facility] Vascular Surgery  05/20/24   O: [phone]  F: [phone]

## 2024-05-20 NOTE — CASE MANAGEMENT/SOCIAL WORK
Case Management Discharge Note           Provided Post Acute Provider List?: N/A  Provided Post Acute Provider Quality & Resource List?: N/A     Durable Medical Equipment Coordination complete.      Service Provider Selected Services Address Phone Fax Patient Preferred    [employer] [address] , [place] [address] [phone] --           Transportation Services  Private: Car    Final Discharge Disposition Code: 01 - home or self-care      Continued Stay Note  [facility]     Patient Name: Jen Cruz  MRN: [id number]  Today's Date: 5/20/2024    Admit Date: 5/11/2024    Plan: D/c Plan: Return home with sister. Needs PCP and Cardiology appointment before d/c. Watch for home O2 needs.   Discharge Plan       Row Name 05/20/24 1640       Plan    Plan Comments [employer] placed by [facility]                  Expected Discharge Date and Time       Expected Discharge Date Expected Discharge Time    May 20, 2024               Georgina Painting RN      Office Phone [phone]  Office Cell [phone]

## 2024-05-20 NOTE — PROGRESS NOTES
[facility]     Progress Note    Patient Name: Jen Cruz  : 1970  MRN: [id number]  Primary Care Physician:  Provider, No Known  Date of admission: 2024  Service date and time: 24 10:33 EDT  Subjective   Subjective     Chief Complaint: Shortness of breath     History of Present Illness: Jen Cruz is a 53 y.o. female who presented to [facility] on 2024 complaining of progressive shortness of breath and wheezing with some productive cough for last few days.  Patient underwent workup in ER including CT chest PE protocol, revealing no pulmonary emboli, but did reveal multifocal pneumonia in both lung fields, her viral panel came out positive for rhinovirus.  Patient received some IV antibiotics in ER.  Patient also noted to significantly elevated proBNP and CT chest also revealing CHF pattern.  Did receive a dose of Lasix in ER.  Patient says she was in the [facility] from where she was transferred to [facility], patient was diagnosed with the DVT and PE and with some coagulation disorder for which she was put on Eliquis.  Patient also was noted to have cardiomyopathy with ejection fraction around 25%.,  She denies having any cardiac cath or stress test done.  Patient's home medication list reveals she is on metoprolol, Aldactone  And diuretic therapy.  Following this we were asked to the patient for the further care        Review of Systems   All other systems reviewed and are negative.    2024-Patient Reports is complaining of pain in the right groin   2024 patient seen and examined in bed no acute distress, vital signs stable, denies any chest pain or shortness of breath.  Awaiting vest.  Discussed with RN.    Objective   Objective     Vitals:   Temp:  [97.7 °F (36.5 °C)-99.1 °F (37.3 °C)] 99.1 °F (37.3 °C)  Heart Rate:  [] 90  Resp:  [14-21] 16  BP: (113-134)/(59-76) 113/60  Physical Exam    Constitutional: Awake, alert in no distress   Eyes:  PERRLA, sclerae anicteric, no conjunctival injection   HENT: NCAT, mucous membranes moist   Neck: Supple, no thyromegaly, no lymphadenopathy, trachea midline   Respiratory: Clear to auscultation bilaterally, nonlabored respirations    Cardiovascular: RRR, no murmurs, rubs, or gallops, palpable pedal pulses bilaterally   Gastrointestinal: Positive bowel sounds, soft, nontender, nondistended   Musculoskeletal: No bilateral ankle edema, no clubbing or cyanosis to extremities   Psychiatric: Appropriate affect, cooperative   Neurologic: Oriented x 3, strength symmetric in all extremities, Cranial Nerves grossly intact to confrontation, speech clear   Skin: No rashes     Result Review    Result Review:  I have personally reviewed the results from the time of this admission to 5/20/2024 10:33 EDT and agree with these findings:  [x]  Laboratory list / accordion  []  Microbiology  []  Radiology  []  EKG/Telemetry   []  Cardiology/Vascular   []  Pathology  []  Old records  []  Other:  Most notable findings include: BUN 23, creatinine 1.02, sodium 134, glucose 131, chloride 92, CO2 35, albumin 3.4, ALT 23, AST 25, WBC count 14.98, hemoglobin 7.7, hematocrit 26 platelet count 249      Assessment & Plan   Assessment / Plan       Active Hospital Problems:  Active Hospital Problems    Diagnosis     **Acute on chronic HFrEF (heart failure with reduced ejection fraction)     Postoperative hematoma involving circulatory system following cardiac catheterization     Acute blood loss anemia     Rhinovirus infection     CHF exacerbation      Plan:     Dyspnea multifactorial from bibasilar bacterial pneumonia, unspecified bacteria  Acute COPD exacerbation  Viral URI with rhinovirus  -CTA chest negative for PE but does show bilateral bibasilar pneumonia  -Finished up antibiotics with IV ceftriaxone and azithromycin  -RVP positive for rhinovirus  -Continue bronchodilators and finished steroids on 5/17  -Weaned down to room air and doing  well     New onset acute HFrEF  NSTEMI  -Echo this admission shows severe LV systolic dysfunction with EF 20%  -Troponin elevation noted, could be type II MI per cardiology  -Needs LHC performed, nephrology has cleared patient for LHC, plan for LHC today per cardiology  -Continue diuresis with Lasix, monitor I's and O's  -Continue GDMT as per cardiology  -Cardiology following     Leukocytosis  -Reactive from steroids, patient clinically improving  -Finish up antibiotics as above     NICCI  Hyponatremia  -Likely ATN in setting of contra exposure and diuresis per nephrology  -Creatinine slowly improving  -Avoid ACE inhibitor or ARB for now  -Monitor sodium with diuresis  -Nephrology following     Hypertension  -Continue on metoprolol and hydralazine  -Monitor blood pressure, adjust as needed     History of DVT/PE  -Patient previously on Eliquis however was not taking it due to lack of insurance but now has insurance and willing to take Eliquis  -CTA chest negative for PE  -Continue Eliquis     Acute blood loss anemia: Status post PRBC transfusion  Earlier.  Ultrasound right groin showed a large right groin hematoma with no evidence of pseudoaneurysm    Added Oxycodone as needed for pain        DVT prophylaxis:  Medical DVT prophylaxis orders are present.        CODE STATUS:   Code Status (Patient has no pulse and is not breathing): CPR (Attempt to Resuscitate)  Medical Interventions (Patient has pulse or is breathing): Full Support    Disposition:  I expect patient to be discharged on 5/20/2024.  Electronically signed by Ronnie Terry MD, 05/20/24, 10:35 AM EDT.

## 2024-05-21 PROBLEM — Z91.89 AT RISK FOR FLUID VOLUME OVERLOAD: Status: ACTIVE | Noted: 2024-05-21

## 2024-05-21 PROBLEM — N18.2 CKD (CHRONIC KIDNEY DISEASE) STAGE 2, GFR 60-89 ML/MIN: Chronic | Status: ACTIVE | Noted: 2024-05-21

## 2024-05-21 PROBLEM — I21.4 NON-ST ELEVATED MYOCARDIAL INFARCTION (NON-STEMI): Status: ACTIVE | Noted: 2024-05-21

## 2024-05-21 PROBLEM — Z72.0 TOBACCO ABUSE DISORDER: Chronic | Status: ACTIVE | Noted: 2024-05-21

## 2024-05-21 PROBLEM — I42.8 NICM (NONISCHEMIC CARDIOMYOPATHY): Chronic | Status: ACTIVE | Noted: 2024-05-21

## 2024-05-21 PROBLEM — I38 VALVULAR HEART DISEASE: Status: ACTIVE | Noted: 2024-05-21

## 2024-05-21 PROBLEM — R06.09 DOE (DYSPNEA ON EXERTION): Chronic | Status: ACTIVE | Noted: 2024-05-21

## 2024-05-21 PROBLEM — J43.1 PANLOBULAR EMPHYSEMA: Chronic | Status: ACTIVE | Noted: 2024-05-21

## 2024-05-21 PROBLEM — I50.9 CHF EXACERBATION: Status: RESOLVED | Noted: 2024-05-11 | Resolved: 2024-05-21

## 2024-05-21 PROBLEM — I50.42 CHRONIC COMBINED SYSTOLIC AND DIASTOLIC HEART FAILURE: Chronic | Status: ACTIVE | Noted: 2024-05-21

## 2024-05-21 PROBLEM — Z86.39 HISTORY OF ELEVATED GLUCOSE: Chronic | Status: ACTIVE | Noted: 2024-05-21

## 2024-05-21 PROBLEM — I27.81 CHRONIC COR PULMONALE: Status: ACTIVE | Noted: 2024-05-21

## 2024-05-21 PROBLEM — I50.21 ACUTE HFREF (HEART FAILURE WITH REDUCED EJECTION FRACTION): Status: RESOLVED | Noted: 2024-05-20 | Resolved: 2024-05-21

## 2024-05-21 PROBLEM — I27.20 PULMONARY HYPERTENSION: Status: ACTIVE | Noted: 2024-05-21

## 2024-05-21 PROBLEM — I50.23 ACUTE ON CHRONIC HFREF (HEART FAILURE WITH REDUCED EJECTION FRACTION): Status: RESOLVED | Noted: 2024-05-13 | Resolved: 2024-05-21

## 2024-05-21 NOTE — OUTREACH NOTE
Prep Survey      Flowsheet Row Responses   [facility] facility [facility] discharged from? [facility]   Is LACE score < 7 ? No   Eligibility Readm Mgmt   Discharge diagnosis a/c CHF-Coronary angiography   Does the patient have one of the following disease processes/diagnoses(primary or secondary)? CHF   Does the patient have Home health ordered? No   Is there a DME ordered? No   General alerts for this patient [facility] lifevest   Prep survey completed? Yes            WOLFGANG PERALES - Registered Nurse

## 2024-05-23 ENCOUNTER — READMISSION MANAGEMENT (OUTPATIENT)
Dept: CALL CENTER | Facility: HOSPITAL | Age: 54
End: 2024-05-23
Payer: MEDICAID

## 2024-05-23 NOTE — OUTREACH NOTE
CHF Week 1 Survey      Flowsheet Row Responses   [facility] facility patient discharged from? [facility]   Does the patient have one of the following disease processes/diagnoses(primary or secondary)? CHF   CHF Week 1 attempt successful? No   Unsuccessful attempts Attempt 1            Zhane GREEN - Registered Nurse

## 2024-05-30 ENCOUNTER — READMISSION MANAGEMENT (OUTPATIENT)
Dept: CALL CENTER | Facility: HOSPITAL | Age: 54
End: 2024-05-30
Payer: MEDICAID

## 2024-05-30 NOTE — OUTREACH NOTE
CHF Week 2 Survey      Flowsheet Row Responses   [facility]ond[facility]omin[facility] facility patient discharged from? [facility]   Does the patient have one of the following disease processes/diagnoses(primary or secondary)? CHF   Week 2 attempt successful? No   Unsuccessful attempts Attempt 1   Revoke Phone number issues            Mary Carmen MERCADO - Registered Nurse

## (undated) DEVICE — ELECTRD DEFIB M/FUNC PROPADZ RADIOL 2PK

## (undated) DEVICE — PINNACLE INTRODUCER SHEATH: Brand: PINNACLE

## (undated) DEVICE — PK TRY HEART CATH 50

## (undated) DEVICE — DGW .035 MC J3MM 150CM T H AMP: Brand: EMERALD

## (undated) DEVICE — CATH DIAG IMPULSE FL4 6F 100CM

## (undated) DEVICE — CATH DIAG IMPULSE PIG .056 6F 110CM

## (undated) DEVICE — CATH DIAG IMPULSE FR4 6F 100CM